# Patient Record
Sex: FEMALE | Race: WHITE | NOT HISPANIC OR LATINO | Employment: UNEMPLOYED | URBAN - NONMETROPOLITAN AREA
[De-identification: names, ages, dates, MRNs, and addresses within clinical notes are randomized per-mention and may not be internally consistent; named-entity substitution may affect disease eponyms.]

---

## 2017-01-04 ENCOUNTER — OFFICE VISIT (OUTPATIENT)
Dept: FAMILY MEDICINE CLINIC | Facility: CLINIC | Age: 57
End: 2017-01-04

## 2017-01-04 VITALS
BODY MASS INDEX: 29.82 KG/M2 | DIASTOLIC BLOOD PRESSURE: 80 MMHG | OXYGEN SATURATION: 98 % | HEIGHT: 65 IN | WEIGHT: 179 LBS | HEART RATE: 86 BPM | SYSTOLIC BLOOD PRESSURE: 138 MMHG

## 2017-01-04 DIAGNOSIS — G89.29 CHRONIC MIDLINE LOW BACK PAIN WITH LEFT-SIDED SCIATICA: Primary | ICD-10-CM

## 2017-01-04 DIAGNOSIS — M54.42 CHRONIC MIDLINE LOW BACK PAIN WITH LEFT-SIDED SCIATICA: Primary | ICD-10-CM

## 2017-01-04 PROCEDURE — 99213 OFFICE O/P EST LOW 20 MIN: CPT | Performed by: FAMILY MEDICINE

## 2017-01-04 RX ORDER — HYDROCODONE BITARTRATE AND ACETAMINOPHEN 10; 325 MG/1; MG/1
1 TABLET ORAL EVERY 6 HOURS PRN
Qty: 120 TABLET | Refills: 0 | Status: SHIPPED | OUTPATIENT
Start: 2017-01-04 | End: 2017-02-01 | Stop reason: SDUPTHER

## 2017-01-04 NOTE — PROGRESS NOTES
Subjective   Giselle Valdes is a 56 y.o. female.     Back Pain   This is a chronic problem. The current episode started more than 1 year ago. The problem has been gradually worsening since onset. The pain is present in the lumbar spine. The quality of the pain is described as aching and shooting. The pain radiates to the left foot. The pain is at a severity of 8/10. The pain is worse during the night. The symptoms are aggravated by standing. Stiffness is present in the morning. Associated symptoms include paresthesias (right leg went ot sleep). Pertinent negatives include no bladder incontinence, bowel incontinence or fever.        The following portions of the patient's history were reviewed and updated as appropriate: allergies, current medications, past family history, past medical history, past social history, past surgical history and problem list.    Review of Systems   Constitutional: Negative for fever.   Gastrointestinal: Negative for bowel incontinence.   Genitourinary: Negative for bladder incontinence.   Musculoskeletal: Positive for back pain.   Neurological: Positive for paresthesias (right leg went ot sleep).       Objective   Physical Exam   Constitutional: She is oriented to person, place, and time. She appears well-developed and well-nourished. No distress.   HENT:   Head: Normocephalic and atraumatic.   Musculoskeletal:        Lumbar back: She exhibits decreased range of motion, tenderness and pain. She exhibits no bony tenderness, no swelling, no edema, no deformity, no laceration and no spasm.   Neurological: She is alert and oriented to person, place, and time.   Reflex Scores:       Patellar reflexes are 2+ on the right side and 2+ on the left side.  Skin: Skin is warm and dry. She is not diaphoretic.   Psychiatric: She has a normal mood and affect. Her behavior is normal. Judgment and thought content normal.   Nursing note and vitals reviewed.      Assessment/Plan   Problems Addressed  this Visit        Other    Chronic midline low back pain with left-sided sciatica - Primary    Relevant Orders    Urine Drug Screen

## 2017-01-04 NOTE — MR AVS SNAPSHOT
Giselle Valdes   1/4/2017 1:15 PM   Office Visit    Dept Phone:  [phone]   Encounter #:  [id number]    Provider:  Mikal Trammell MD   Department:  [facility] FAM[facility] MEDICINE                Your Full Care Plan              Where to Get Your Medications      You can get these medications from any pharmacy     Bring a paper prescription for each of these medications     HYDROcodone-acetaminophen  MG per tablet            Your Updated Medication List          This list is accurate as of: 1/4/17  1:34 PM.  Always use your most recent med list.                amLODIPine 5 MG tablet   Commonly known as:  NORVASC   Take 1 tablet by mouth Daily.       atenolol 50 MG tablet   Commonly known as:  TENORMIN   Take 1 tablet by mouth Daily.       cyclobenzaprine 10 MG tablet   Commonly known as:  FLEXERIL       DULoxetine 60 MG capsule   Commonly known as:  CYMBALTA       HYDROcodone-acetaminophen  MG per tablet   Commonly known as:  NORCO   Take 1 tablet by mouth Every 6 (Six) Hours As Needed for moderate pain (4-6).       losartan 100 MG tablet   Commonly known as:  COZAAR   Take 1 tablet by mouth Daily.       omeprazole 20 MG capsule   Commonly known as:  priLOSEC       ORENCIA 125 MG/ML solution prefilled syringe   Generic drug:  Abatacept               You Were Diagnosed With        Codes Comments    Chronic midline low back pain with left-sided sciatica    -  Primary ICD-10-CM: M54.42, G89.29  ICD-9-CM: 724.2, 724.3, 338.29       Instructions     None    Patient Instructions History      Upcoming Appointments     Visit Type Date Time Department    OFFICE VISIT 1/4/2017  1:15 PM [facility]    OFFICE VISIT 2/1/2017  1:45 PM [facility]    OFFICE VISIT 6/27/2017  9:00 AM [facility] SURGERY [facility]      [facility] Sign[facility]     [facility] allows you to send messages to your doctor, view your test results, renew your prescriptions, schedule  "appointments, and more. To sign up, go to [employer] and click on the Sign Up Now link in the New User? box. Enter your [employer] Activation Code exactly as it appears below along with the last four digits of your Social Security Number and your Date of Birth () to complete the sign-up process. If you do not sign up before the expiration date, you must request a new code.    Rapt Activation Code: [email or web address]  Expires: 2017  1:33 PM    If you have questions, you can email [email or web address] or call [phone] to talk to our [employer] staff. Remember, [employer] is NOT to be used for urgent needs. For medical emergencies, dial 911.               Other Info from Your Visit           Your Appointments     2017  1:45 PM CST   Office Visit with Mikal Trammell MD   [facility] FAMILY MEDICINE (--)    [address] [place] [address] [address]  [facility] [address] [place] [id number] [phone]           Arrive 15 minutes prior to appointment.            2017  9:00 AM CDT   Office Visit with Brandyn Givens MD   [facility] GENERAL SURGERY (--)    [address] [place] [address] [address]  [facility] [address] [place] [id number] [phone]           Arrive 15 minutes prior to appointment.              Allergies     Fosamax [Alendronate]      GERD    Iodinated Diagnostic Agents  Rash      Reason for Visit     Hypertension           Vital Signs     Blood Pressure Pulse Height Weight Oxygen Saturation Body Mass Index    138/80 86 65\" (165.1 cm) 179 lb (81.2 kg) 98% 29.79 kg/m2    Smoking Status                   Current Every Day Smoker           Problems and Diagnoses Noted     Chronic midline low back pain with left-sided sciatica        "

## 2017-01-17 RX ORDER — AMLODIPINE BESYLATE 5 MG/1
TABLET ORAL
Qty: 30 TABLET | Refills: 5 | Status: SHIPPED | OUTPATIENT
Start: 2017-01-17 | End: 2017-10-27 | Stop reason: SDUPTHER

## 2017-01-17 RX ORDER — OMEPRAZOLE 20 MG/1
CAPSULE, DELAYED RELEASE ORAL
Qty: 60 CAPSULE | Refills: 5 | Status: SHIPPED | OUTPATIENT
Start: 2017-01-17 | End: 2018-01-15 | Stop reason: SDUPTHER

## 2017-02-01 ENCOUNTER — OFFICE VISIT (OUTPATIENT)
Dept: FAMILY MEDICINE CLINIC | Facility: CLINIC | Age: 57
End: 2017-02-01

## 2017-02-01 VITALS
BODY MASS INDEX: 29.2 KG/M2 | HEART RATE: 89 BPM | DIASTOLIC BLOOD PRESSURE: 76 MMHG | SYSTOLIC BLOOD PRESSURE: 132 MMHG | HEIGHT: 65 IN | OXYGEN SATURATION: 98 % | WEIGHT: 175.3 LBS

## 2017-02-01 DIAGNOSIS — M06.9 RHEUMATOID ARTHRITIS OF HAND, UNSPECIFIED LATERALITY, UNSPECIFIED RHEUMATOID FACTOR PRESENCE: Primary | ICD-10-CM

## 2017-02-01 DIAGNOSIS — M54.42 CHRONIC MIDLINE LOW BACK PAIN WITH LEFT-SIDED SCIATICA: ICD-10-CM

## 2017-02-01 DIAGNOSIS — G89.29 CHRONIC MIDLINE LOW BACK PAIN WITH LEFT-SIDED SCIATICA: ICD-10-CM

## 2017-02-01 PROCEDURE — 96372 THER/PROPH/DIAG INJ SC/IM: CPT | Performed by: FAMILY MEDICINE

## 2017-02-01 PROCEDURE — 99213 OFFICE O/P EST LOW 20 MIN: CPT | Performed by: FAMILY MEDICINE

## 2017-02-01 RX ORDER — HYDROCODONE BITARTRATE AND ACETAMINOPHEN 10; 325 MG/1; MG/1
1 TABLET ORAL EVERY 6 HOURS PRN
Qty: 120 TABLET | Refills: 0 | Status: SHIPPED | OUTPATIENT
Start: 2017-02-01 | End: 2017-03-02 | Stop reason: SDUPTHER

## 2017-02-01 RX ORDER — TRIAMCINOLONE ACETONIDE 40 MG/ML
80 INJECTION, SUSPENSION INTRA-ARTICULAR; INTRAMUSCULAR ONCE
Status: COMPLETED | OUTPATIENT
Start: 2017-02-01 | End: 2017-02-01

## 2017-02-01 RX ADMIN — TRIAMCINOLONE ACETONIDE 80 MG: 40 INJECTION, SUSPENSION INTRA-ARTICULAR; INTRAMUSCULAR at 14:10

## 2017-02-01 NOTE — PROGRESS NOTES
Subjective   Giselle Valdes is a 56 y.o. female.     Back Pain   This is a chronic problem. The current episode started more than 1 year ago. The problem has been gradually worsening since onset. The pain is present in the lumbar spine. The quality of the pain is described as aching and shooting. The pain radiates to the left foot. The pain is at a severity of 8/10. The pain is worse during the night. The symptoms are aggravated by standing. Stiffness is present in the morning. Associated symptoms include paresthesias (right leg went ot sleep). Pertinent negatives include no bladder incontinence, bowel incontinence or fever.        The following portions of the patient's history were reviewed and updated as appropriate: allergies, current medications, past family history, past medical history, past social history, past surgical history and problem list.    Review of Systems   Constitutional: Negative for fever.   Gastrointestinal: Negative for bowel incontinence.   Genitourinary: Negative for bladder incontinence.   Musculoskeletal: Positive for back pain.   Neurological: Positive for paresthesias (right leg went ot sleep).       Objective   Physical Exam   Constitutional: She is oriented to person, place, and time. She appears well-developed and well-nourished. No distress.   HENT:   Head: Normocephalic and atraumatic.   Musculoskeletal:        Lumbar back: She exhibits decreased range of motion, tenderness and pain. She exhibits no bony tenderness, no swelling, no edema, no deformity, no laceration and no spasm.   Neurological: She is alert and oriented to person, place, and time.   Reflex Scores:       Patellar reflexes are 2+ on the right side and 2+ on the left side.  Skin: Skin is warm and dry. She is not diaphoretic.   Psychiatric: She has a normal mood and affect. Her behavior is normal. Judgment and thought content normal.   Nursing note and vitals reviewed.      Assessment/Plan   Problems Addressed  this Visit        Musculoskeletal and Integument    Rheumatoid arthritis - Primary       Other    Chronic midline low back pain with left-sided sciatica

## 2017-03-02 ENCOUNTER — OFFICE VISIT (OUTPATIENT)
Dept: FAMILY MEDICINE CLINIC | Facility: CLINIC | Age: 57
End: 2017-03-02

## 2017-03-02 VITALS
BODY MASS INDEX: 29.77 KG/M2 | HEART RATE: 85 BPM | SYSTOLIC BLOOD PRESSURE: 118 MMHG | OXYGEN SATURATION: 98 % | DIASTOLIC BLOOD PRESSURE: 68 MMHG | WEIGHT: 178.9 LBS

## 2017-03-02 DIAGNOSIS — M54.2 NECK PAIN: ICD-10-CM

## 2017-03-02 DIAGNOSIS — M54.42 CHRONIC MIDLINE LOW BACK PAIN WITH LEFT-SIDED SCIATICA: Primary | ICD-10-CM

## 2017-03-02 DIAGNOSIS — G89.29 CHRONIC MIDLINE LOW BACK PAIN WITH LEFT-SIDED SCIATICA: Primary | ICD-10-CM

## 2017-03-02 PROCEDURE — 99213 OFFICE O/P EST LOW 20 MIN: CPT | Performed by: FAMILY MEDICINE

## 2017-03-02 RX ORDER — HYDROCODONE BITARTRATE AND ACETAMINOPHEN 10; 325 MG/1; MG/1
1 TABLET ORAL EVERY 6 HOURS PRN
Qty: 120 TABLET | Refills: 0 | Status: SHIPPED | OUTPATIENT
Start: 2017-03-02 | End: 2017-05-02 | Stop reason: SDUPTHER

## 2017-03-02 RX ORDER — HYDROCODONE BITARTRATE AND ACETAMINOPHEN 10; 325 MG/1; MG/1
1 TABLET ORAL EVERY 6 HOURS PRN
Qty: 120 TABLET | Refills: 0 | Status: SHIPPED | OUTPATIENT
Start: 2017-03-02 | End: 2017-03-02 | Stop reason: SDUPTHER

## 2017-03-02 NOTE — PROGRESS NOTES
Subjective   Giselle Valdes is a 56 y.o. female.     Back Pain   This is a chronic problem. The current episode started more than 1 year ago. The problem has been gradually worsening since onset. The pain is present in the lumbar spine. The quality of the pain is described as aching and shooting. The pain radiates to the left foot. The pain is at a severity of 4/10. The pain is worse during the night. The symptoms are aggravated by standing. Stiffness is present in the morning. Associated symptoms include paresthesias (right leg went ot sleep). Pertinent negatives include no bladder incontinence, bowel incontinence, fever, numbness, tingling or weakness.   Neck Pain    This is a new problem. The current episode started in the past 7 days. The problem occurs constantly. The problem has been gradually worsening. The pain is associated with nothing. The pain is present in the occipital region. The quality of the pain is described as shooting. The pain is moderate. Pertinent negatives include no fever, numbness, tingling or weakness.        The following portions of the patient's history were reviewed and updated as appropriate: allergies, current medications, past family history, past medical history, past social history, past surgical history and problem list.    Review of Systems   Constitutional: Negative for fever.   Gastrointestinal: Negative for bowel incontinence.   Genitourinary: Negative for bladder incontinence.   Musculoskeletal: Positive for back pain and neck pain.   Neurological: Positive for paresthesias (right leg went ot sleep). Negative for tingling, weakness and numbness.       Objective   Physical Exam   Constitutional: She is oriented to person, place, and time. She appears well-developed and well-nourished. No distress.   HENT:   Head: Normocephalic and atraumatic.   Musculoskeletal:        Cervical back: She exhibits decreased range of motion, tenderness and pain. She exhibits no bony  tenderness, no swelling, no edema, no deformity, no laceration and no spasm.        Lumbar back: She exhibits decreased range of motion, tenderness and pain. She exhibits no bony tenderness, no swelling, no edema, no deformity, no laceration and no spasm.   Neurological: She is alert and oriented to person, place, and time.   Reflex Scores:       Patellar reflexes are 2+ on the right side and 2+ on the left side.  Skin: Skin is warm and dry. She is not diaphoretic.   Psychiatric: She has a normal mood and affect. Her behavior is normal. Judgment and thought content normal.   Nursing note and vitals reviewed.      Assessment/Plan   Problems Addressed this Visit        Other    Chronic midline low back pain with left-sided sciatica - Primary      Other Visit Diagnoses     Neck pain

## 2017-05-02 ENCOUNTER — OFFICE VISIT (OUTPATIENT)
Dept: FAMILY MEDICINE CLINIC | Facility: CLINIC | Age: 57
End: 2017-05-02

## 2017-05-02 ENCOUNTER — LAB (OUTPATIENT)
Dept: LAB | Facility: HOSPITAL | Age: 57
End: 2017-05-02

## 2017-05-02 VITALS
BODY MASS INDEX: 29.45 KG/M2 | DIASTOLIC BLOOD PRESSURE: 76 MMHG | OXYGEN SATURATION: 98 % | WEIGHT: 177 LBS | SYSTOLIC BLOOD PRESSURE: 120 MMHG | HEART RATE: 70 BPM

## 2017-05-02 DIAGNOSIS — I10 ESSENTIAL HYPERTENSION: Primary | ICD-10-CM

## 2017-05-02 DIAGNOSIS — R53.83 FATIGUE, UNSPECIFIED TYPE: ICD-10-CM

## 2017-05-02 DIAGNOSIS — G89.29 CHRONIC MIDLINE LOW BACK PAIN WITH LEFT-SIDED SCIATICA: ICD-10-CM

## 2017-05-02 DIAGNOSIS — M06.9 RHEUMATOID ARTHRITIS OF HAND, UNSPECIFIED LATERALITY, UNSPECIFIED RHEUMATOID FACTOR PRESENCE: ICD-10-CM

## 2017-05-02 DIAGNOSIS — M54.42 CHRONIC MIDLINE LOW BACK PAIN WITH LEFT-SIDED SCIATICA: ICD-10-CM

## 2017-05-02 LAB
ALBUMIN SERPL-MCNC: 4.3 G/DL (ref 3.4–4.8)
ALBUMIN/GLOB SERPL: 1.2 G/DL (ref 1.1–1.8)
ALP SERPL-CCNC: 78 U/L (ref 38–126)
ALT SERPL W P-5'-P-CCNC: 31 U/L (ref 9–52)
ANION GAP SERPL CALCULATED.3IONS-SCNC: 10 MMOL/L (ref 5–15)
AST SERPL-CCNC: 26 U/L (ref 14–36)
BASOPHILS # BLD AUTO: 0.05 10*3/MM3 (ref 0–0.2)
BASOPHILS NFR BLD AUTO: 0.5 % (ref 0–2)
BILIRUB SERPL-MCNC: 0.7 MG/DL (ref 0.2–1.3)
BUN BLD-MCNC: 11 MG/DL (ref 7–21)
BUN/CREAT SERPL: 13.3 (ref 7–25)
CALCIUM SPEC-SCNC: 9.9 MG/DL (ref 8.4–10.2)
CHLORIDE SERPL-SCNC: 99 MMOL/L (ref 95–110)
CO2 SERPL-SCNC: 27 MMOL/L (ref 22–31)
CREAT BLD-MCNC: 0.83 MG/DL (ref 0.5–1)
DEPRECATED RDW RBC AUTO: 46 FL (ref 36.4–46.3)
EOSINOPHIL # BLD AUTO: 0.16 10*3/MM3 (ref 0–0.7)
EOSINOPHIL NFR BLD AUTO: 1.7 % (ref 0–7)
ERYTHROCYTE [DISTWIDTH] IN BLOOD BY AUTOMATED COUNT: 13.9 % (ref 11.5–14.5)
GFR SERPL CREATININE-BSD FRML MDRD: 71 ML/MIN/1.73 (ref 51–120)
GLOBULIN UR ELPH-MCNC: 3.5 GM/DL (ref 2.3–3.5)
GLUCOSE BLD-MCNC: 103 MG/DL (ref 60–100)
HCT VFR BLD AUTO: 40.8 % (ref 35–45)
HGB BLD-MCNC: 13.9 G/DL (ref 12–15.5)
IMM GRANULOCYTES # BLD: 0.05 10*3/MM3 (ref 0–0.02)
IMM GRANULOCYTES NFR BLD: 0.5 % (ref 0–0.5)
LYMPHOCYTES # BLD AUTO: 2.62 10*3/MM3 (ref 0.6–4.2)
LYMPHOCYTES NFR BLD AUTO: 28.2 % (ref 10–50)
MCH RBC QN AUTO: 31 PG (ref 26.5–34)
MCHC RBC AUTO-ENTMCNC: 34.1 G/DL (ref 31.4–36)
MCV RBC AUTO: 91.1 FL (ref 80–98)
MONOCYTES # BLD AUTO: 0.88 10*3/MM3 (ref 0–0.9)
MONOCYTES NFR BLD AUTO: 9.5 % (ref 0–12)
NEUTROPHILS # BLD AUTO: 5.53 10*3/MM3 (ref 2–8.6)
NEUTROPHILS NFR BLD AUTO: 59.6 % (ref 37–80)
PLATELET # BLD AUTO: 375 10*3/MM3 (ref 150–450)
PMV BLD AUTO: 9 FL (ref 8–12)
POTASSIUM BLD-SCNC: 4.1 MMOL/L (ref 3.5–5.1)
PROT SERPL-MCNC: 7.8 G/DL (ref 6.3–8.6)
RBC # BLD AUTO: 4.48 10*6/MM3 (ref 3.77–5.16)
SODIUM BLD-SCNC: 136 MMOL/L (ref 137–145)
T4 FREE SERPL-MCNC: 1.05 NG/DL (ref 0.78–2.19)
TSH SERPL DL<=0.05 MIU/L-ACNC: 1.24 MIU/ML (ref 0.46–4.68)
VIT B12 BLD-MCNC: 248 PG/ML (ref 239–931)
WBC NRBC COR # BLD: 9.29 10*3/MM3 (ref 3.2–9.8)

## 2017-05-02 PROCEDURE — 80053 COMPREHEN METABOLIC PANEL: CPT | Performed by: FAMILY MEDICINE

## 2017-05-02 PROCEDURE — 36415 COLL VENOUS BLD VENIPUNCTURE: CPT | Performed by: FAMILY MEDICINE

## 2017-05-02 PROCEDURE — 82607 VITAMIN B-12: CPT | Performed by: FAMILY MEDICINE

## 2017-05-02 PROCEDURE — 84439 ASSAY OF FREE THYROXINE: CPT | Performed by: FAMILY MEDICINE

## 2017-05-02 PROCEDURE — 85025 COMPLETE CBC W/AUTO DIFF WBC: CPT | Performed by: FAMILY MEDICINE

## 2017-05-02 PROCEDURE — 99214 OFFICE O/P EST MOD 30 MIN: CPT | Performed by: FAMILY MEDICINE

## 2017-05-02 PROCEDURE — 84443 ASSAY THYROID STIM HORMONE: CPT | Performed by: FAMILY MEDICINE

## 2017-05-02 RX ORDER — HYDROCODONE BITARTRATE AND ACETAMINOPHEN 10; 325 MG/1; MG/1
1 TABLET ORAL EVERY 6 HOURS PRN
Qty: 120 TABLET | Refills: 0 | Status: SHIPPED | OUTPATIENT
Start: 2017-05-02 | End: 2017-06-02 | Stop reason: SDUPTHER

## 2017-05-17 RX ORDER — CYCLOBENZAPRINE HCL 10 MG
TABLET ORAL
Qty: 30 TABLET | Refills: 11 | Status: SHIPPED | OUTPATIENT
Start: 2017-05-17 | End: 2017-06-02 | Stop reason: SDUPTHER

## 2017-05-23 DIAGNOSIS — M06.9 RHEUMATOID ARTHRITIS INVOLVING MULTIPLE SITES, UNSPECIFIED RHEUMATOID FACTOR PRESENCE: Primary | ICD-10-CM

## 2017-05-24 ENCOUNTER — TELEPHONE (OUTPATIENT)
Dept: FAMILY MEDICINE CLINIC | Facility: CLINIC | Age: 57
End: 2017-05-24

## 2017-06-02 ENCOUNTER — OFFICE VISIT (OUTPATIENT)
Dept: FAMILY MEDICINE CLINIC | Facility: CLINIC | Age: 57
End: 2017-06-02

## 2017-06-02 VITALS
WEIGHT: 180.8 LBS | HEART RATE: 86 BPM | BODY MASS INDEX: 30.09 KG/M2 | SYSTOLIC BLOOD PRESSURE: 138 MMHG | DIASTOLIC BLOOD PRESSURE: 80 MMHG | OXYGEN SATURATION: 98 %

## 2017-06-02 DIAGNOSIS — G89.29 CHRONIC MIDLINE LOW BACK PAIN WITH LEFT-SIDED SCIATICA: ICD-10-CM

## 2017-06-02 DIAGNOSIS — M06.9 RHEUMATOID ARTHRITIS OF HAND, UNSPECIFIED LATERALITY, UNSPECIFIED RHEUMATOID FACTOR PRESENCE: Primary | ICD-10-CM

## 2017-06-02 DIAGNOSIS — M54.42 CHRONIC MIDLINE LOW BACK PAIN WITH LEFT-SIDED SCIATICA: ICD-10-CM

## 2017-06-02 PROCEDURE — 99213 OFFICE O/P EST LOW 20 MIN: CPT | Performed by: FAMILY MEDICINE

## 2017-06-02 RX ORDER — HYDROCODONE BITARTRATE AND ACETAMINOPHEN 10; 325 MG/1; MG/1
1 TABLET ORAL EVERY 6 HOURS PRN
Qty: 120 TABLET | Refills: 0 | Status: SHIPPED | OUTPATIENT
Start: 2017-06-02 | End: 2017-06-27 | Stop reason: SDUPTHER

## 2017-06-02 RX ORDER — CYCLOBENZAPRINE HCL 10 MG
10 TABLET ORAL 3 TIMES DAILY PRN
Qty: 30 TABLET | Refills: 11 | Status: SHIPPED | OUTPATIENT
Start: 2017-06-02 | End: 2017-11-20 | Stop reason: SDUPTHER

## 2017-06-02 NOTE — PROGRESS NOTES
Subjective   Giselle Valdes is a 56 y.o. female.     Back Pain   This is a chronic problem. The current episode started more than 1 year ago. The problem has been gradually worsening since onset. The pain is present in the lumbar spine. The quality of the pain is described as aching and shooting. The pain radiates to the left foot. The pain is at a severity of 7/10. The pain is worse during the night. The symptoms are aggravated by standing. Stiffness is present in the morning and all day. Associated symptoms include paresthesias (right leg went ot sleep). Pertinent negatives include no bladder incontinence, bowel incontinence or fever.        The following portions of the patient's history were reviewed and updated as appropriate: allergies, current medications, past family history, past medical history, past social history, past surgical history and problem list.    Review of Systems   Constitutional: Negative for fever.   Gastrointestinal: Negative for bowel incontinence.   Genitourinary: Negative for bladder incontinence.   Musculoskeletal: Positive for back pain.   Neurological: Positive for paresthesias (right leg went ot sleep).       Objective   Physical Exam   Constitutional: She is oriented to person, place, and time. She appears well-developed and well-nourished. No distress.   HENT:   Head: Normocephalic and atraumatic.   Musculoskeletal:        Lumbar back: She exhibits decreased range of motion, tenderness and pain. She exhibits no bony tenderness, no swelling, no edema, no deformity, no laceration and no spasm.   Neurological: She is alert and oriented to person, place, and time.   Reflex Scores:       Patellar reflexes are 2+ on the right side and 2+ on the left side.  Skin: Skin is warm and dry. She is not diaphoretic.   Psychiatric: She has a normal mood and affect. Her behavior is normal. Judgment and thought content normal.   Nursing note and vitals reviewed.      Assessment/Plan   Problems  Addressed this Visit        Musculoskeletal and Integument    Rheumatoid arthritis - Primary       Other    Chronic midline low back pain with left-sided sciatica

## 2017-06-21 RX ORDER — DULOXETIN HYDROCHLORIDE 60 MG/1
CAPSULE, DELAYED RELEASE ORAL
Qty: 30 CAPSULE | Refills: 11 | Status: SHIPPED | OUTPATIENT
Start: 2017-06-21 | End: 2018-06-15 | Stop reason: SDUPTHER

## 2017-06-27 ENCOUNTER — OFFICE VISIT (OUTPATIENT)
Dept: FAMILY MEDICINE CLINIC | Facility: CLINIC | Age: 57
End: 2017-06-27

## 2017-06-27 ENCOUNTER — PREP FOR SURGERY (OUTPATIENT)
Dept: OTHER | Facility: HOSPITAL | Age: 57
End: 2017-06-27

## 2017-06-27 ENCOUNTER — OFFICE VISIT (OUTPATIENT)
Dept: SURGERY | Facility: CLINIC | Age: 57
End: 2017-06-27

## 2017-06-27 VITALS
HEART RATE: 89 BPM | OXYGEN SATURATION: 98 % | HEIGHT: 65 IN | WEIGHT: 182.1 LBS | BODY MASS INDEX: 30.34 KG/M2 | DIASTOLIC BLOOD PRESSURE: 82 MMHG | SYSTOLIC BLOOD PRESSURE: 134 MMHG

## 2017-06-27 VITALS
DIASTOLIC BLOOD PRESSURE: 70 MMHG | SYSTOLIC BLOOD PRESSURE: 124 MMHG | HEIGHT: 65 IN | BODY MASS INDEX: 30.66 KG/M2 | WEIGHT: 184 LBS

## 2017-06-27 DIAGNOSIS — I10 ESSENTIAL HYPERTENSION: Primary | ICD-10-CM

## 2017-06-27 DIAGNOSIS — Z86.010 HISTORY OF COLON POLYPS: Primary | ICD-10-CM

## 2017-06-27 DIAGNOSIS — G89.29 CHRONIC MIDLINE LOW BACK PAIN WITH LEFT-SIDED SCIATICA: ICD-10-CM

## 2017-06-27 DIAGNOSIS — M06.9 RHEUMATOID ARTHRITIS OF HAND, UNSPECIFIED LATERALITY, UNSPECIFIED RHEUMATOID FACTOR PRESENCE: ICD-10-CM

## 2017-06-27 DIAGNOSIS — F32.A DEPRESSIVE DISORDER: ICD-10-CM

## 2017-06-27 DIAGNOSIS — M54.42 CHRONIC MIDLINE LOW BACK PAIN WITH LEFT-SIDED SCIATICA: ICD-10-CM

## 2017-06-27 PROCEDURE — 99214 OFFICE O/P EST MOD 30 MIN: CPT | Performed by: FAMILY MEDICINE

## 2017-06-27 PROCEDURE — 99213 OFFICE O/P EST LOW 20 MIN: CPT | Performed by: SURGERY

## 2017-06-27 RX ORDER — HYDROCODONE BITARTRATE AND ACETAMINOPHEN 10; 325 MG/1; MG/1
1 TABLET ORAL EVERY 6 HOURS PRN
Qty: 120 TABLET | Refills: 0 | Status: SHIPPED | OUTPATIENT
Start: 2017-06-27 | End: 2017-07-27 | Stop reason: SDUPTHER

## 2017-06-27 RX ORDER — LIDOCAINE HYDROCHLORIDE 10 MG/ML
0.1 INJECTION, SOLUTION EPIDURAL; INFILTRATION; INTRACAUDAL; PERINEURAL ONCE AS NEEDED
Status: CANCELLED | OUTPATIENT
Start: 2017-07-18

## 2017-06-27 RX ORDER — DEXTROSE AND SODIUM CHLORIDE 5; .45 G/100ML; G/100ML
30 INJECTION, SOLUTION INTRAVENOUS CONTINUOUS PRN
Status: CANCELLED | OUTPATIENT
Start: 2017-07-18

## 2017-06-27 RX ORDER — SODIUM CHLORIDE 0.9 % (FLUSH) 0.9 %
1-10 SYRINGE (ML) INJECTION AS NEEDED
Status: CANCELLED | OUTPATIENT
Start: 2017-07-18

## 2017-06-27 NOTE — PROGRESS NOTES
Chief Complaint: Need for Colonoscopy    Giselle Valdes is a 56 y.o. female here today to arrange surveillance colonoscopy.   She underwent right colon resection last year for large villous adenoma.    Prior Colonoscopy:yes  Prior Polyps:yes  Family History of Colon Cancer:no  On anticoagulation/antiplatelets:no    Past Surgical History:   Procedure Laterality Date   • BREAST BIOPSY  02/06/2001    left ,breast mass,lobular carcinoma   • BREAST RECONSTRUCTION  10/09/2001    Bi;ateral nipple/areolar reconstruction using C-V flaps and full thickness skin grafts from groin.Acquired absence of bilateral breast   • CHOLECYSTECTOMY  06/01/2016    Laparoscopic right colon resectoin with ileocolonic anastomosis   • COLONOSCOPY  06/01/2016    Laparoscopic right colon resectoin with ileocolonic anastomosis. 06/01/2016    n/a One 13 mm polyp at 90 cm prox. to the anus. Resected and retrieved.Normal colon 2 polyps.   • ENDOSCOPY  04/10/2001    Hiatal hernia,Mild esophagitis,Due to the coarsening of the pancreas on ultrasound,will need to do a CT of the abdomen   • HAND SURGERY  11/21/1997    excision thumb lesion, MP joint    • INJECTION OF MEDICATION  09/30/2014    celestone(2)   • INJECTION OF MEDICATION  06/25/2013    DEXAMETHASONE(9)   • INJECTION OF MEDICATION  01/29/2012    SOLU-MEDROL (1)   • KNEE ARTHROSCOPY  03/01/2001    right,tear lateral meniscus   • MASTECTOMY  03/01/2001    left modified radical mastectomy,right total,varcinoma of the left breast,with lobular carcinoma in situ   • SKIN BIOPSY  08/03/1995    right,cheek lesion ,solar degeneration with sebaceous gland hyperplasia,     Past Medical History:   Diagnosis Date   • Acute maxillary sinusitis 01/14/2014   • Anorectal pain 05/03/2016   • Astigmatism 12/03/2014   • Bunion [id number]   • Chronic back pain 12/02/2015     low back and othes from RA      • Constipation 03/24/2016   • Cramp in limb 01/04/2016    lower   • Depressive disorder 08/12/2015   •  Disorder of gallbladder 07/01/2015    gallstones, 6mm wall, 4 mm cbd      • Gastroesophageal reflux disease 01/25/2010   • H/O echocardiogram 04/10/2001    This is a limited study ,however the LV appears to be functioning normally with an estimated fraction of 60-65%   • History of colonic polyps 06/28/2016   • Hypermetropia 12/03/2014   • Low back pain 06/14/2016    spinal stenosis on MRI Seeing korina Yen      • Neck pain 08/12/2015   • Neuropathy 08/12/2015   • Peripheral nervous system disorder 12/07/2012   • Primary malignant neoplasm of breast 11/22/2012    no recurrence noted      • Rheumatoid arthritis 04/01/2016   • Tobacco dependence syndrome 11/22/2012     Social History     Social History   • Marital status:      Spouse name: N/A   • Number of children: N/A   • Years of education: N/A     Occupational History   • Not on file.     Social History Main Topics   • Smoking status: Current Every Day Smoker     Packs/day: 1.00   • Smokeless tobacco: Never Used   • Alcohol use No   • Drug use: No   • Sexual activity: Not on file     Other Topics Concern   • Not on file     Social History Narrative     Current Outpatient Prescriptions on File Prior to Visit   Medication Sig Dispense Refill   • Abatacept (ORENCIA) 125 MG/ML solution prefilled syringe Inject 125 mg under the skin 1 (One) Time Per Week.     • amLODIPine (NORVASC) 5 MG tablet TAKE 1 TABLET EVERY DAY 30 tablet 5   • atenolol (TENORMIN) 50 MG tablet Take 1 tablet by mouth Daily. 30 tablet 11   • cyclobenzaprine (FLEXERIL) 10 MG tablet Take 1 tablet by mouth 3 (Three) Times a Day As Needed for Muscle Spasms. 30 tablet 11   • DULoxetine (CYMBALTA) 60 MG capsule TAKE 1 CAPSULE BY MOUTH DAILY 30 capsule 11   • HYDROcodone-acetaminophen (NORCO)  MG per tablet Take 1 tablet by mouth Every 6 (Six) Hours As Needed for Moderate Pain (4-6). 120 tablet 0   • losartan (COZAAR) 100 MG tablet Take 1 tablet by mouth Daily. 30 tablet 11   •  omeprazole (priLOSEC) 20 MG capsule TAKE 1 CAPSULE EVERY DAY 60 capsule 5     No current facility-administered medications on file prior to visit.          Review of Systems   Constitutional: Negative for appetite change, chills, fever and unexpected weight change.   HENT: Negative for hearing loss, nosebleeds and trouble swallowing.    Eyes: Negative for visual disturbance.   Respiratory: Negative for apnea, cough, choking, chest tightness, shortness of breath, wheezing and stridor.    Cardiovascular: Negative for chest pain, palpitations and leg swelling.   Gastrointestinal: Negative for abdominal distention, abdominal pain, blood in stool, constipation, diarrhea, nausea and vomiting.   Endocrine: Negative for cold intolerance, heat intolerance, polydipsia, polyphagia and polyuria.   Genitourinary: Negative for difficulty urinating, dysuria, frequency, hematuria and urgency.   Musculoskeletal: Positive for arthralgias. Negative for back pain, myalgias and neck pain.   Skin: Negative for color change, pallor and rash.   Allergic/Immunologic: Negative for immunocompromised state.   Neurological: Negative for dizziness, seizures, syncope, light-headedness, numbness and headaches.   Hematological: Negative for adenopathy.   Psychiatric/Behavioral: Negative for suicidal ideas. The patient is not nervous/anxious.        Vitals:    06/27/17 0856   BP: 124/70       Physical Exam:       General Appearance:    Alert, cooperative, in no acute distress   Head:    Normocephalic, without obvious abnormality, atraumatic   Eyes:            Lids and lashes normal, conjunctivae and sclerae normal, no   icterus, no pallor, corneas clear, PERRLA   Ears:    Ears appear intact with no abnormalities noted   Throat:   No oral lesions, no thrush, oral mucosa moist   Neck:   No adenopathy, supple, trachea midline, no thyromegaly, no   carotid bruit, no JVD   Back:     No kyphosis present, no scoliosis present, no skin lesions,       erythema or scars, no tenderness to percussion or                   palpation, range of motion normal   Lungs:     Clear to auscultation,respirations regular, even and                  unlabored    Heart:    Regular rhythm and normal rate, normal S1 and S2, no            murmur, no gallop, no rub, no click   Chest Wall:    No abnormalities observed   Abdomen:     Normal bowel sounds, no masses, no organomegaly, soft        non-tender, non-distended, no guarding, no rebound                Tenderness, well healed scar   Extremities:   Moves all extremities well, no edema, no cyanosis, no             redness   Pulses:   Pulses palpable and equal bilaterally   Skin:   No bleeding, bruising or rash   Lymph nodes:   No palpable adenopathy   Neurologic:   Cranial nerves 2 - 12 grossly intact, sensation intact, DTR       present and equal bilaterally       Assessment     In need of surveillance colonoscopy.    Plan     Risks, benefits, rationale and prep for colonoscopy have been discussed with the patient.  The patient indicates understanding of these issues and agrees with the plan.  Prep with magnesium citrate and dulcolax per her wishes.  Hold Orencia the week prior.          This document has been electronically signed by Brandyn Givens MD on June 27, 2017 9:27 AM

## 2017-06-27 NOTE — PROGRESS NOTES
Subjective   Giselle Valdes is a 56 y.o. female.     Hypertension   This is a chronic (elevated Bp today) problem. The current episode started today. The problem has been waxing and waning since onset. The problem is controlled. Associated symptoms include headaches. Pertinent negatives include no chest pain, peripheral edema or PND. Agents associated with hypertension include steroids. Risk factors for coronary artery disease include family history. Past treatments include nothing. Compliance problems include diet.    Back Pain   This is a chronic problem. The current episode started more than 1 year ago. The problem has been gradually worsening since onset. The pain is present in the lumbar spine. The quality of the pain is described as aching and shooting. The pain radiates to the left foot. The pain is at a severity of 6/10. The pain is worse during the night. The symptoms are aggravated by standing. Stiffness is present in the morning. Associated symptoms include headaches. Pertinent negatives include no bladder incontinence, bowel incontinence, chest pain or fever. Paresthesias: right leg went ot sleep.   Depression   Visit Type: follow-up  Patient presents with the following symptoms: insomnia.  Patient is not experiencing: depressed mood and nervousness/anxiety.         The following portions of the patient's history were reviewed and updated as appropriate: allergies, current medications, past family history, past medical history, past social history, past surgical history and problem list.    Review of Systems   Constitutional: Negative for fever.   Cardiovascular: Negative for chest pain and PND.   Gastrointestinal: Negative for bowel incontinence.   Genitourinary: Negative for bladder incontinence.   Musculoskeletal: Positive for back pain.   Neurological: Positive for headaches. Paresthesias: right leg went ot sleep.   Psychiatric/Behavioral: The patient has insomnia. The patient is not  nervous/anxious.        Objective   Physical Exam   Constitutional: She is oriented to person, place, and time. She appears well-developed and well-nourished. No distress.   HENT:   Head: Normocephalic and atraumatic.   Cardiovascular: Normal rate, regular rhythm and normal heart sounds.    Pulmonary/Chest: Effort normal and breath sounds normal. No respiratory distress. She has no wheezes. She has no rales. She exhibits no tenderness.   Musculoskeletal:        Lumbar back: She exhibits decreased range of motion, tenderness and pain. She exhibits no bony tenderness, no swelling, no edema, no deformity, no laceration and no spasm.   Neurological: She is alert and oriented to person, place, and time.   Reflex Scores:       Patellar reflexes are 2+ on the right side and 2+ on the left side.  Skin: Skin is warm and dry. She is not diaphoretic.   Psychiatric: She has a normal mood and affect. Her behavior is normal. Judgment and thought content normal.   Nursing note and vitals reviewed.      Assessment/Plan   Problems Addressed this Visit        Cardiovascular and Mediastinum    Essential hypertension - Primary (Chronic)       Musculoskeletal and Integument    Rheumatoid arthritis (Chronic)       Other    Chronic midline low back pain with left-sided sciatica (Chronic)    Depressive disorder

## 2017-07-18 ENCOUNTER — ANESTHESIA EVENT (OUTPATIENT)
Dept: GASTROENTEROLOGY | Facility: HOSPITAL | Age: 57
End: 2017-07-18

## 2017-07-18 ENCOUNTER — ANESTHESIA (OUTPATIENT)
Dept: GASTROENTEROLOGY | Facility: HOSPITAL | Age: 57
End: 2017-07-18

## 2017-07-18 ENCOUNTER — HOSPITAL ENCOUNTER (OUTPATIENT)
Facility: HOSPITAL | Age: 57
Setting detail: HOSPITAL OUTPATIENT SURGERY
Discharge: HOME | End: 2017-07-18
Attending: SURGERY | Admitting: SURGERY

## 2017-07-18 VITALS
HEART RATE: 74 BPM | HEIGHT: 65 IN | SYSTOLIC BLOOD PRESSURE: 122 MMHG | TEMPERATURE: 98 F | BODY MASS INDEX: 29.38 KG/M2 | RESPIRATION RATE: 18 BRPM | WEIGHT: 176.37 LBS | DIASTOLIC BLOOD PRESSURE: 73 MMHG | OXYGEN SATURATION: 96 %

## 2017-07-18 DIAGNOSIS — Z86.010 HISTORY OF COLON POLYPS: ICD-10-CM

## 2017-07-18 PROCEDURE — 88305 TISSUE EXAM BY PATHOLOGIST: CPT | Performed by: PATHOLOGY

## 2017-07-18 PROCEDURE — 45380 COLONOSCOPY AND BIOPSY: CPT | Performed by: SURGERY

## 2017-07-18 PROCEDURE — 25010000002 PROPOFOL 10 MG/ML EMULSION: Performed by: NURSE ANESTHETIST, CERTIFIED REGISTERED

## 2017-07-18 PROCEDURE — 88305 TISSUE EXAM BY PATHOLOGIST: CPT | Performed by: SURGERY

## 2017-07-18 PROCEDURE — 25010000002 MIDAZOLAM PER 1 MG: Performed by: NURSE ANESTHETIST, CERTIFIED REGISTERED

## 2017-07-18 RX ORDER — DEXTROSE AND SODIUM CHLORIDE 5; .45 G/100ML; G/100ML
30 INJECTION, SOLUTION INTRAVENOUS CONTINUOUS PRN
Status: DISCONTINUED | OUTPATIENT
Start: 2017-07-18 | End: 2017-07-18 | Stop reason: HOSPADM

## 2017-07-18 RX ORDER — LIDOCAINE HYDROCHLORIDE 10 MG/ML
0.1 INJECTION, SOLUTION EPIDURAL; INFILTRATION; INTRACAUDAL; PERINEURAL ONCE AS NEEDED
Status: DISCONTINUED | OUTPATIENT
Start: 2017-07-18 | End: 2017-07-18 | Stop reason: HOSPADM

## 2017-07-18 RX ORDER — MIDAZOLAM HYDROCHLORIDE 1 MG/ML
INJECTION INTRAMUSCULAR; INTRAVENOUS AS NEEDED
Status: DISCONTINUED | OUTPATIENT
Start: 2017-07-18 | End: 2017-07-18 | Stop reason: SURG

## 2017-07-18 RX ORDER — PROPOFOL 10 MG/ML
VIAL (ML) INTRAVENOUS AS NEEDED
Status: DISCONTINUED | OUTPATIENT
Start: 2017-07-18 | End: 2017-07-18 | Stop reason: SURG

## 2017-07-18 RX ADMIN — MIDAZOLAM 2 MG: 1 INJECTION INTRAMUSCULAR; INTRAVENOUS at 13:44

## 2017-07-18 RX ADMIN — PROPOFOL 30 MG: 10 INJECTION, EMULSION INTRAVENOUS at 13:52

## 2017-07-18 RX ADMIN — PROPOFOL 30 MG: 10 INJECTION, EMULSION INTRAVENOUS at 13:48

## 2017-07-18 RX ADMIN — DEXTROSE AND SODIUM CHLORIDE 30 ML/HR: 5; 450 INJECTION, SOLUTION INTRAVENOUS at 12:49

## 2017-07-18 RX ADMIN — PROPOFOL 30 MG: 10 INJECTION, EMULSION INTRAVENOUS at 13:57

## 2017-07-18 RX ADMIN — PROPOFOL 70 MG: 10 INJECTION, EMULSION INTRAVENOUS at 13:45

## 2017-07-18 NOTE — ANESTHESIA POSTPROCEDURE EVALUATION
Patient: Giselle Valdes    Procedure Summary     Date Anesthesia Start Anesthesia Stop Room / Location    07/18/17 1344 1404 [facility] ENDOSCOPY 2 / [facility] ENDOSCOPY       Procedure Diagnosis Surgeon Provider    COLONOSCOPY (N/A ) History of colon polyps  (History of colon polyps [Z86.010]) MD Amanda Scott CRNA          Anesthesia Type: MAC  Last vitals  /66       Temp        Pulse    75   Resp 16       SpO2     100     Post Anesthesia Care and Evaluation    Patient location during evaluation: bedside  Patient participation: complete - patient participated  Level of consciousness: awake and alert  Pain score: 0  Airway patency: patent  Anesthetic complications: No anesthetic complications  PONV Status: none  Cardiovascular status: acceptable  Respiratory status: acceptable  Hydration status: acceptable

## 2017-07-18 NOTE — ANESTHESIA PREPROCEDURE EVALUATION
Anesthesia Evaluation     no history of anesthetic complications:  NPO Solid Status: > 8 hours  NPO Liquid Status: > 2 hours     Airway   Mallampati: II  TM distance: >3 FB  Neck ROM: full  no difficulty expected  Dental - normal exam     Pulmonary - normal exam   (+) a smoker Current,   Cardiovascular     (+) hypertension well controlled,       Neuro/Psych  (+) psychiatric history Depression,    GI/Hepatic/Renal/Endo    (+)  GERD,     Musculoskeletal     (+) neck pain,   Abdominal    Substance History      OB/GYN          Other      history of cancer                                    Anesthesia Plan    ASA 2     MAC     intravenous induction   Anesthetic plan and risks discussed with patient.

## 2017-07-18 NOTE — INTERVAL H&P NOTE
H&P reviewed. The patient was examined and there are no changes to the H&P.         This document has been electronically signed by Brandyn Givens MD on July 18, 2017 1:33 PM

## 2017-07-18 NOTE — PLAN OF CARE
Problem: Patient Care Overview (Adult)  Goal: Plan of Care Review  Outcome: Ongoing (interventions implemented as appropriate)    07/18/17 1402   Coping/Psychosocial Response Interventions   Plan Of Care Reviewed With patient   Patient Care Overview   Progress no change         Problem: GI Endoscopy (Adult)  Goal: Signs and Symptoms of Listed Potential Problems Will be Absent or Manageable (GI Endoscopy)  Outcome: Ongoing (interventions implemented as appropriate)    07/18/17 1402   GI Endoscopy   Problems Assessed (GI Endoscopy) all   Problems Present (GI Endoscopy) none

## 2017-07-18 NOTE — PLAN OF CARE
Problem: Patient Care Overview (Adult)  Goal: Plan of Care Review  Outcome: Outcome(s) achieved Date Met:  07/18/17 07/18/17 1418   Coping/Psychosocial Response Interventions   Plan Of Care Reviewed With patient   Patient Care Overview   Progress no change   Outcome Evaluation   Outcome Summary/Follow up Plan vss         Problem: GI Endoscopy (Adult)  Goal: Signs and Symptoms of Listed Potential Problems Will be Absent or Manageable (GI Endoscopy)  Outcome: Outcome(s) achieved Date Met:  07/18/17 07/18/17 1418   GI Endoscopy   Problems Assessed (GI Endoscopy) all   Problems Present (GI Endoscopy) none

## 2017-07-18 NOTE — H&P (VIEW-ONLY)
Chief Complaint: Need for Colonoscopy    Giselle Valdes is a 56 y.o. female here today to arrange surveillance colonoscopy.   She underwent right colon resection last year for large villous adenoma.    Prior Colonoscopy:yes  Prior Polyps:yes  Family History of Colon Cancer:no  On anticoagulation/antiplatelets:no    Past Surgical History:   Procedure Laterality Date   • BREAST BIOPSY  02/06/2001    left ,breast mass,lobular carcinoma   • BREAST RECONSTRUCTION  10/09/2001    Bi;ateral nipple/areolar reconstruction using C-V flaps and full thickness skin grafts from groin.Acquired absence of bilateral breast   • CHOLECYSTECTOMY  06/01/2016    Laparoscopic right colon resectoin with ileocolonic anastomosis   • COLONOSCOPY  06/01/2016    Laparoscopic right colon resectoin with ileocolonic anastomosis. 06/01/2016    n/a One 13 mm polyp at 90 cm prox. to the anus. Resected and retrieved.Normal colon 2 polyps.   • ENDOSCOPY  04/10/2001    Hiatal hernia,Mild esophagitis,Due to the coarsening of the pancreas on ultrasound,will need to do a CT of the abdomen   • HAND SURGERY  11/21/1997    excision thumb lesion, MP joint    • INJECTION OF MEDICATION  09/30/2014    celestone(2)   • INJECTION OF MEDICATION  06/25/2013    DEXAMETHASONE(9)   • INJECTION OF MEDICATION  01/29/2012    SOLU-MEDROL (1)   • KNEE ARTHROSCOPY  03/01/2001    right,tear lateral meniscus   • MASTECTOMY  03/01/2001    left modified radical mastectomy,right total,varcinoma of the left breast,with lobular carcinoma in situ   • SKIN BIOPSY  08/03/1995    right,cheek lesion ,solar degeneration with sebaceous gland hyperplasia,     Past Medical History:   Diagnosis Date   • Acute maxillary sinusitis 01/14/2014   • Anorectal pain 05/03/2016   • Astigmatism 12/03/2014   • Bunion [id number]   • Chronic back pain 12/02/2015     low back and othes from RA      • Constipation 03/24/2016   • Cramp in limb 01/04/2016    lower   • Depressive disorder 08/12/2015   •  Disorder of gallbladder 07/01/2015    gallstones, 6mm wall, 4 mm cbd      • Gastroesophageal reflux disease 01/25/2010   • H/O echocardiogram 04/10/2001    This is a limited study ,however the LV appears to be functioning normally with an estimated fraction of 60-65%   • History of colonic polyps 06/28/2016   • Hypermetropia 12/03/2014   • Low back pain 06/14/2016    spinal stenosis on MRI Seeing korina Yen      • Neck pain 08/12/2015   • Neuropathy 08/12/2015   • Peripheral nervous system disorder 12/07/2012   • Primary malignant neoplasm of breast 11/22/2012    no recurrence noted      • Rheumatoid arthritis 04/01/2016   • Tobacco dependence syndrome 11/22/2012     Social History     Social History   • Marital status:      Spouse name: N/A   • Number of children: N/A   • Years of education: N/A     Occupational History   • Not on file.     Social History Main Topics   • Smoking status: Current Every Day Smoker     Packs/day: 1.00   • Smokeless tobacco: Never Used   • Alcohol use No   • Drug use: No   • Sexual activity: Not on file     Other Topics Concern   • Not on file     Social History Narrative     Current Outpatient Prescriptions on File Prior to Visit   Medication Sig Dispense Refill   • Abatacept (ORENCIA) 125 MG/ML solution prefilled syringe Inject 125 mg under the skin 1 (One) Time Per Week.     • amLODIPine (NORVASC) 5 MG tablet TAKE 1 TABLET EVERY DAY 30 tablet 5   • atenolol (TENORMIN) 50 MG tablet Take 1 tablet by mouth Daily. 30 tablet 11   • cyclobenzaprine (FLEXERIL) 10 MG tablet Take 1 tablet by mouth 3 (Three) Times a Day As Needed for Muscle Spasms. 30 tablet 11   • DULoxetine (CYMBALTA) 60 MG capsule TAKE 1 CAPSULE BY MOUTH DAILY 30 capsule 11   • HYDROcodone-acetaminophen (NORCO)  MG per tablet Take 1 tablet by mouth Every 6 (Six) Hours As Needed for Moderate Pain (4-6). 120 tablet 0   • losartan (COZAAR) 100 MG tablet Take 1 tablet by mouth Daily. 30 tablet 11   •  omeprazole (priLOSEC) 20 MG capsule TAKE 1 CAPSULE EVERY DAY 60 capsule 5     No current facility-administered medications on file prior to visit.          Review of Systems   Constitutional: Negative for appetite change, chills, fever and unexpected weight change.   HENT: Negative for hearing loss, nosebleeds and trouble swallowing.    Eyes: Negative for visual disturbance.   Respiratory: Negative for apnea, cough, choking, chest tightness, shortness of breath, wheezing and stridor.    Cardiovascular: Negative for chest pain, palpitations and leg swelling.   Gastrointestinal: Negative for abdominal distention, abdominal pain, blood in stool, constipation, diarrhea, nausea and vomiting.   Endocrine: Negative for cold intolerance, heat intolerance, polydipsia, polyphagia and polyuria.   Genitourinary: Negative for difficulty urinating, dysuria, frequency, hematuria and urgency.   Musculoskeletal: Positive for arthralgias. Negative for back pain, myalgias and neck pain.   Skin: Negative for color change, pallor and rash.   Allergic/Immunologic: Negative for immunocompromised state.   Neurological: Negative for dizziness, seizures, syncope, light-headedness, numbness and headaches.   Hematological: Negative for adenopathy.   Psychiatric/Behavioral: Negative for suicidal ideas. The patient is not nervous/anxious.        Vitals:    06/27/17 0856   BP: 124/70       Physical Exam:       General Appearance:    Alert, cooperative, in no acute distress   Head:    Normocephalic, without obvious abnormality, atraumatic   Eyes:            Lids and lashes normal, conjunctivae and sclerae normal, no   icterus, no pallor, corneas clear, PERRLA   Ears:    Ears appear intact with no abnormalities noted   Throat:   No oral lesions, no thrush, oral mucosa moist   Neck:   No adenopathy, supple, trachea midline, no thyromegaly, no   carotid bruit, no JVD   Back:     No kyphosis present, no scoliosis present, no skin lesions,       erythema or scars, no tenderness to percussion or                   palpation, range of motion normal   Lungs:     Clear to auscultation,respirations regular, even and                  unlabored    Heart:    Regular rhythm and normal rate, normal S1 and S2, no            murmur, no gallop, no rub, no click   Chest Wall:    No abnormalities observed   Abdomen:     Normal bowel sounds, no masses, no organomegaly, soft        non-tender, non-distended, no guarding, no rebound                Tenderness, well healed scar   Extremities:   Moves all extremities well, no edema, no cyanosis, no             redness   Pulses:   Pulses palpable and equal bilaterally   Skin:   No bleeding, bruising or rash   Lymph nodes:   No palpable adenopathy   Neurologic:   Cranial nerves 2 - 12 grossly intact, sensation intact, DTR       present and equal bilaterally       Assessment     In need of surveillance colonoscopy.    Plan     Risks, benefits, rationale and prep for colonoscopy have been discussed with the patient.  The patient indicates understanding of these issues and agrees with the plan.  Prep with magnesium citrate and dulcolax per her wishes.  Hold Orencia the week prior.          This document has been electronically signed by Brandyn Givens MD on June 27, 2017 9:27 AM

## 2017-07-20 LAB
LAB AP CASE REPORT: NORMAL
Lab: NORMAL
PATH REPORT.FINAL DX SPEC: NORMAL
PATH REPORT.GROSS SPEC: NORMAL

## 2017-07-25 ENCOUNTER — OFFICE VISIT (OUTPATIENT)
Dept: SURGERY | Facility: CLINIC | Age: 57
End: 2017-07-25

## 2017-07-25 VITALS
SYSTOLIC BLOOD PRESSURE: 146 MMHG | DIASTOLIC BLOOD PRESSURE: 88 MMHG | WEIGHT: 184 LBS | HEIGHT: 65 IN | BODY MASS INDEX: 30.66 KG/M2

## 2017-07-25 DIAGNOSIS — Z86.010 HISTORY OF COLONIC POLYPS: Primary | ICD-10-CM

## 2017-07-25 PROCEDURE — 99212 OFFICE O/P EST SF 10 MIN: CPT | Performed by: SURGERY

## 2017-07-25 NOTE — PROGRESS NOTES
CHIEF COMPLAINT:    Chief Complaint   Patient presents with   • Follow-up     Endo results.       HISTORY OF PRESENT ILLNESS:    Giselle Valdes is a 56 y.o. female who underwent colonoscopy last week for history of right colon resection for a villous adenoma.  She had a hyperplastic polyp and inflammatory polyp removed. She is well today.    EXAM:  Vitals:    07/25/17 0946   BP: 146/88         Abdomen soft    ASSESSMENT:    History of Colonic Polyps, villous adenoma previously    PLAN:    Overall doing well.  Will need colonoscopy in 5 years.          This document has been electronically signed by Brandyn Givens MD on July 25, 2017 10:57 AM

## 2017-07-27 ENCOUNTER — OFFICE VISIT (OUTPATIENT)
Dept: FAMILY MEDICINE CLINIC | Facility: CLINIC | Age: 57
End: 2017-07-27

## 2017-07-27 VITALS
HEART RATE: 92 BPM | SYSTOLIC BLOOD PRESSURE: 132 MMHG | OXYGEN SATURATION: 98 % | HEIGHT: 65 IN | BODY MASS INDEX: 30.41 KG/M2 | WEIGHT: 182.5 LBS | DIASTOLIC BLOOD PRESSURE: 78 MMHG

## 2017-07-27 DIAGNOSIS — C50.919 PRIMARY MALIGNANT NEOPLASM OF BREAST, UNSPECIFIED LATERALITY (HCC): Primary | ICD-10-CM

## 2017-07-27 DIAGNOSIS — G89.29 CHRONIC MIDLINE LOW BACK PAIN WITH LEFT-SIDED SCIATICA: ICD-10-CM

## 2017-07-27 DIAGNOSIS — M54.42 CHRONIC MIDLINE LOW BACK PAIN WITH LEFT-SIDED SCIATICA: ICD-10-CM

## 2017-07-27 PROCEDURE — 99213 OFFICE O/P EST LOW 20 MIN: CPT | Performed by: FAMILY MEDICINE

## 2017-07-27 RX ORDER — HYDROCODONE BITARTRATE AND ACETAMINOPHEN 10; 325 MG/1; MG/1
1 TABLET ORAL EVERY 6 HOURS PRN
Qty: 120 TABLET | Refills: 0 | Status: SHIPPED | OUTPATIENT
Start: 2017-07-27 | End: 2017-08-24 | Stop reason: SDUPTHER

## 2017-07-27 RX ORDER — HYDROCODONE BITARTRATE AND ACETAMINOPHEN 10; 325 MG/1; MG/1
1 TABLET ORAL EVERY 6 HOURS PRN
Qty: 120 TABLET | Refills: 0 | Status: SHIPPED | OUTPATIENT
Start: 2017-07-27 | End: 2017-07-27 | Stop reason: SDUPTHER

## 2017-07-27 NOTE — PROGRESS NOTES
Subjective   Giselle Valdes is a 56 y.o. female.     Back Pain   This is a chronic problem. The current episode started more than 1 year ago. The problem has been gradually worsening since onset. The pain is present in the lumbar spine. The quality of the pain is described as aching and shooting. The pain radiates to the left foot. The pain is at a severity of 6/10. The pain is moderate. The pain is worse during the night. The symptoms are aggravated by standing. Stiffness is present in the morning. Pertinent negatives include no bladder incontinence, bowel incontinence or fever.        The following portions of the patient's history were reviewed and updated as appropriate: allergies, current medications, past family history, past medical history, past social history, past surgical history and problem list.    Review of Systems   Constitutional: Negative for fever.   Gastrointestinal: Negative for bowel incontinence.   Genitourinary: Negative for bladder incontinence.   Musculoskeletal: Positive for back pain.       Objective   Physical Exam   Constitutional: She is oriented to person, place, and time. She appears well-developed and well-nourished. No distress.   HENT:   Head: Normocephalic and atraumatic.   Cardiovascular: Normal rate, regular rhythm and normal heart sounds.    Pulmonary/Chest: Effort normal and breath sounds normal. No respiratory distress. She has no wheezes. She has no rales. She exhibits no tenderness.   Musculoskeletal:        Lumbar back: She exhibits decreased range of motion, tenderness and pain. She exhibits no bony tenderness, no swelling, no edema, no deformity, no laceration and no spasm.   Neurological: She is alert and oriented to person, place, and time.   Reflex Scores:       Patellar reflexes are 2+ on the right side and 2+ on the left side.  Skin: Skin is warm and dry. She is not diaphoretic.   Psychiatric: She has a normal mood and affect. Her behavior is normal. Judgment  and thought content normal.   Nursing note and vitals reviewed.      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

## 2017-08-24 ENCOUNTER — OFFICE VISIT (OUTPATIENT)
Dept: FAMILY MEDICINE CLINIC | Facility: CLINIC | Age: 57
End: 2017-08-24

## 2017-08-24 VITALS
DIASTOLIC BLOOD PRESSURE: 82 MMHG | WEIGHT: 181.1 LBS | BODY MASS INDEX: 30.17 KG/M2 | OXYGEN SATURATION: 98 % | HEIGHT: 65 IN | HEART RATE: 85 BPM | SYSTOLIC BLOOD PRESSURE: 134 MMHG

## 2017-08-24 DIAGNOSIS — M06.9 RHEUMATOID ARTHRITIS OF HAND, UNSPECIFIED LATERALITY, UNSPECIFIED RHEUMATOID FACTOR PRESENCE: Primary | ICD-10-CM

## 2017-08-24 DIAGNOSIS — G89.29 CHRONIC MIDLINE LOW BACK PAIN WITH LEFT-SIDED SCIATICA: ICD-10-CM

## 2017-08-24 DIAGNOSIS — M54.42 CHRONIC MIDLINE LOW BACK PAIN WITH LEFT-SIDED SCIATICA: ICD-10-CM

## 2017-08-24 DIAGNOSIS — L30.9 ECZEMA OF RIGHT HAND: ICD-10-CM

## 2017-08-24 PROCEDURE — 99214 OFFICE O/P EST MOD 30 MIN: CPT | Performed by: FAMILY MEDICINE

## 2017-08-24 RX ORDER — TRIAMCINOLONE ACETONIDE 1 MG/G
CREAM TOPICAL 2 TIMES DAILY
Qty: 15 G | Refills: 1 | Status: SHIPPED | OUTPATIENT
Start: 2017-08-24 | End: 2017-10-24

## 2017-08-24 RX ORDER — HYDROCODONE BITARTRATE AND ACETAMINOPHEN 10; 325 MG/1; MG/1
1 TABLET ORAL EVERY 6 HOURS PRN
Qty: 120 TABLET | Refills: 0 | Status: SHIPPED | OUTPATIENT
Start: 2017-08-24 | End: 2017-09-21 | Stop reason: SDUPTHER

## 2017-08-24 NOTE — PROGRESS NOTES
Subjective   Giselle Valdes is a 57 y.o. female.     Back Pain   This is a chronic problem. The current episode started more than 1 year ago. The problem has been gradually worsening since onset. The pain is present in the lumbar spine. The quality of the pain is described as aching and shooting. The pain radiates to the left foot. The pain is at a severity of 6/10. The pain is moderate. The pain is worse during the night. The symptoms are aggravated by standing. Stiffness is present in the morning. Pertinent negatives include no bladder incontinence, bowel incontinence, fever, numbness or tingling.   Knee Pain    The incident occurred more than 1 week ago. There was no injury mechanism. The pain is present in the right knee. The quality of the pain is described as aching. The pain is at a severity of 7/10. The pain is moderate. The pain has been fluctuating since onset. Associated symptoms include muscle weakness. Pertinent negatives include no loss of motion, numbness or tingling.   Rash   This is a new problem. The current episode started 1 to 4 weeks ago. The problem has been waxing and waning since onset. The affected locations include the right hand. The rash is characterized by pain. Pertinent negatives include no fever.        The following portions of the patient's history were reviewed and updated as appropriate: allergies, current medications, past family history, past medical history, past social history, past surgical history and problem list.    Review of Systems   Constitutional: Negative for fever.   Gastrointestinal: Negative for bowel incontinence.   Genitourinary: Negative for bladder incontinence.   Musculoskeletal: Positive for back pain.   Skin: Positive for rash.   Neurological: Negative for tingling and numbness.       Objective   Physical Exam   Constitutional: She is oriented to person, place, and time. She appears well-developed and well-nourished. No distress.   HENT:   Head:  Normocephalic and atraumatic.   Pulmonary/Chest: Effort normal and breath sounds normal.   Musculoskeletal:        Right knee: She exhibits decreased range of motion and swelling. Tenderness found.        Lumbar back: She exhibits decreased range of motion, tenderness and pain. She exhibits no bony tenderness, no swelling, no edema, no deformity, no laceration and no spasm.   Neurological: She is alert and oriented to person, place, and time.   Reflex Scores:       Patellar reflexes are 2+ on the right side and 2+ on the left side.  Skin: Skin is warm and dry. Rash noted. She is not diaphoretic.        Psychiatric: She has a normal mood and affect. Her behavior is normal. Judgment and thought content normal.   Nursing note and vitals reviewed.      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

## 2017-09-21 ENCOUNTER — OFFICE VISIT (OUTPATIENT)
Dept: FAMILY MEDICINE CLINIC | Facility: CLINIC | Age: 57
End: 2017-09-21

## 2017-09-21 VITALS
HEART RATE: 82 BPM | BODY MASS INDEX: 30.22 KG/M2 | SYSTOLIC BLOOD PRESSURE: 136 MMHG | WEIGHT: 181.4 LBS | OXYGEN SATURATION: 97 % | DIASTOLIC BLOOD PRESSURE: 78 MMHG | HEIGHT: 65 IN

## 2017-09-21 DIAGNOSIS — M06.9 RHEUMATOID ARTHRITIS OF HAND, UNSPECIFIED LATERALITY, UNSPECIFIED RHEUMATOID FACTOR PRESENCE: Primary | ICD-10-CM

## 2017-09-21 DIAGNOSIS — M54.42 CHRONIC MIDLINE LOW BACK PAIN WITH LEFT-SIDED SCIATICA: ICD-10-CM

## 2017-09-21 DIAGNOSIS — G89.29 CHRONIC MIDLINE LOW BACK PAIN WITH LEFT-SIDED SCIATICA: ICD-10-CM

## 2017-09-21 DIAGNOSIS — Z23 FLU VACCINE NEED: ICD-10-CM

## 2017-09-21 PROCEDURE — 90471 IMMUNIZATION ADMIN: CPT | Performed by: FAMILY MEDICINE

## 2017-09-21 PROCEDURE — 99213 OFFICE O/P EST LOW 20 MIN: CPT | Performed by: FAMILY MEDICINE

## 2017-09-21 PROCEDURE — 90686 IIV4 VACC NO PRSV 0.5 ML IM: CPT | Performed by: FAMILY MEDICINE

## 2017-09-21 RX ORDER — HYDROCODONE BITARTRATE AND ACETAMINOPHEN 10; 325 MG/1; MG/1
1 TABLET ORAL EVERY 6 HOURS PRN
Qty: 120 TABLET | Refills: 0 | Status: SHIPPED | OUTPATIENT
Start: 2017-09-21 | End: 2017-10-19 | Stop reason: SDUPTHER

## 2017-09-21 NOTE — PROGRESS NOTES
Subjective   Giselle Valdes is a 57 y.o. female.     Arthritis   Pertinent negatives include no fever.   Back Pain   This is a chronic problem. The current episode started more than 1 year ago. The problem has been gradually worsening since onset. The pain is present in the lumbar spine. The quality of the pain is described as aching and shooting. The pain radiates to the left foot. The pain is at a severity of 7/10. The pain is moderate. The pain is worse during the night. The symptoms are aggravated by standing. Stiffness is present in the morning. Pertinent negatives include no bladder incontinence, bowel incontinence or fever.        The following portions of the patient's history were reviewed and updated as appropriate: allergies, current medications, past family history, past medical history, past social history, past surgical history and problem list.    Review of Systems   Constitutional: Negative for fever.   Gastrointestinal: Negative for bowel incontinence.   Genitourinary: Negative for bladder incontinence.   Musculoskeletal: Positive for arthritis and back pain.       Objective   Physical Exam   Constitutional: She is oriented to person, place, and time. She appears well-developed and well-nourished. No distress.   HENT:   Head: Normocephalic and atraumatic.   Cardiovascular: Normal rate, regular rhythm and normal heart sounds.    Pulmonary/Chest: Effort normal and breath sounds normal. No respiratory distress. She has no wheezes. She has no rales. She exhibits no tenderness.   Musculoskeletal:        Lumbar back: She exhibits decreased range of motion, tenderness and pain. She exhibits no bony tenderness, no swelling, no edema, no deformity, no laceration and no spasm.   Neurological: She is alert and oriented to person, place, and time.   Reflex Scores:       Patellar reflexes are 2+ on the right side and 2+ on the left side.  Skin: Skin is warm and dry. She is not diaphoretic.   Psychiatric: She  has a normal mood and affect. Her behavior is normal. Judgment and thought content normal.   Nursing note and vitals reviewed.      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

## 2017-10-19 ENCOUNTER — OFFICE VISIT (OUTPATIENT)
Dept: FAMILY MEDICINE CLINIC | Facility: CLINIC | Age: 57
End: 2017-10-19

## 2017-10-19 VITALS
BODY MASS INDEX: 29.97 KG/M2 | HEIGHT: 65 IN | HEART RATE: 78 BPM | SYSTOLIC BLOOD PRESSURE: 130 MMHG | WEIGHT: 179.9 LBS | DIASTOLIC BLOOD PRESSURE: 88 MMHG | OXYGEN SATURATION: 99 %

## 2017-10-19 DIAGNOSIS — M54.42 CHRONIC MIDLINE LOW BACK PAIN WITH LEFT-SIDED SCIATICA: ICD-10-CM

## 2017-10-19 DIAGNOSIS — F51.01 PRIMARY INSOMNIA: ICD-10-CM

## 2017-10-19 DIAGNOSIS — M06.9 RHEUMATOID ARTHRITIS OF HAND, UNSPECIFIED LATERALITY, UNSPECIFIED RHEUMATOID FACTOR PRESENCE: ICD-10-CM

## 2017-10-19 DIAGNOSIS — F32.A DEPRESSIVE DISORDER: ICD-10-CM

## 2017-10-19 DIAGNOSIS — G89.29 CHRONIC MIDLINE LOW BACK PAIN WITH LEFT-SIDED SCIATICA: ICD-10-CM

## 2017-10-19 DIAGNOSIS — I10 ESSENTIAL HYPERTENSION: Primary | ICD-10-CM

## 2017-10-19 PROCEDURE — 99214 OFFICE O/P EST MOD 30 MIN: CPT | Performed by: FAMILY MEDICINE

## 2017-10-19 RX ORDER — HYDROCODONE BITARTRATE AND ACETAMINOPHEN 10; 325 MG/1; MG/1
1 TABLET ORAL EVERY 6 HOURS PRN
Qty: 120 TABLET | Refills: 0 | Status: SHIPPED | OUTPATIENT
Start: 2017-10-19 | End: 2017-11-20 | Stop reason: SDUPTHER

## 2017-10-19 RX ORDER — TRAZODONE HYDROCHLORIDE 100 MG/1
100 TABLET ORAL NIGHTLY
Qty: 30 TABLET | Refills: 11 | Status: SHIPPED | OUTPATIENT
Start: 2017-10-19 | End: 2018-10-19 | Stop reason: SDUPTHER

## 2017-10-19 NOTE — PROGRESS NOTES
Subjective   Giselle Valdes is a 57 y.o. female.     Hypertension   This is a chronic (elevated Bp today) problem. The current episode started today. The problem has been waxing and waning since onset. The problem is controlled. Associated symptoms include headaches. Pertinent negatives include no chest pain, peripheral edema or PND. Agents associated with hypertension include steroids. Risk factors for coronary artery disease include family history. Past treatments include nothing. Compliance problems include diet.    Back Pain   This is a chronic problem. The current episode started more than 1 year ago. The problem has been gradually worsening since onset. The pain is present in the lumbar spine. The quality of the pain is described as aching and shooting. The pain radiates to the left foot. The pain is at a severity of 6/10. The pain is worse during the night. The symptoms are aggravated by standing. Stiffness is present in the morning. Associated symptoms include headaches. Pertinent negatives include no bladder incontinence, bowel incontinence, chest pain or fever. Paresthesias: right leg went ot sleep.   Depression   Visit Type: follow-up  Patient presents with the following symptoms: insomnia.  Patient is not experiencing: depressed mood and nervousness/anxiety.         The following portions of the patient's history were reviewed and updated as appropriate: allergies, current medications, past family history, past medical history, past social history, past surgical history and problem list.    Review of Systems   Constitutional: Negative for fever.   Cardiovascular: Negative for chest pain and PND.   Gastrointestinal: Negative for bowel incontinence.   Genitourinary: Negative for bladder incontinence.   Musculoskeletal: Positive for back pain.   Neurological: Positive for headaches. Paresthesias: right leg went ot sleep.   Psychiatric/Behavioral: The patient has insomnia. The patient is not  nervous/anxious.        Objective   Physical Exam   Constitutional: She is oriented to person, place, and time. She appears well-developed and well-nourished. No distress.   HENT:   Head: Normocephalic and atraumatic.   Cardiovascular: Normal rate, regular rhythm and normal heart sounds.    Pulmonary/Chest: Effort normal and breath sounds normal. No respiratory distress. She has no wheezes. She has no rales. She exhibits no tenderness.   Musculoskeletal:        Lumbar back: She exhibits decreased range of motion, tenderness and pain. She exhibits no bony tenderness, no swelling, no edema, no deformity, no laceration and no spasm.   Neurological: She is oriented to person, place, and time. She is unresponsive.   Reflex Scores:       Patellar reflexes are 2+ on the right side and 2+ on the left side.  Skin: Skin is warm and dry. She is not diaphoretic.   Psychiatric: She has a normal mood and affect. Her behavior is normal. Judgment and thought content normal.   Nursing note and vitals reviewed.      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

## 2017-10-23 ENCOUNTER — TELEPHONE (OUTPATIENT)
Dept: FAMILY MEDICINE CLINIC | Facility: CLINIC | Age: 57
End: 2017-10-23

## 2017-10-23 NOTE — TELEPHONE ENCOUNTER
Pt. Called advising she had been having elevated BP all weekend. Woke up this AM to a headache and elevated BP. She made an appt to come see Dr. Trammell tomorrow

## 2017-10-23 NOTE — TELEPHONE ENCOUNTER
----- Message from Sheryl Ball sent at 10/23/2017  1:41 PM CDT -----  Contact: PT CALLED  Pt called and said that she has been having BP issues all weekend. This morning at 6:00 am the bottom number was 101 after she took her blood pressure pill it came down to 94. Phone is [phone].

## 2017-10-24 ENCOUNTER — OFFICE VISIT (OUTPATIENT)
Dept: FAMILY MEDICINE CLINIC | Facility: CLINIC | Age: 57
End: 2017-10-24

## 2017-10-24 VITALS
DIASTOLIC BLOOD PRESSURE: 90 MMHG | SYSTOLIC BLOOD PRESSURE: 143 MMHG | HEIGHT: 65 IN | HEART RATE: 92 BPM | BODY MASS INDEX: 30.1 KG/M2 | WEIGHT: 180.7 LBS | OXYGEN SATURATION: 96 %

## 2017-10-24 DIAGNOSIS — I10 UNCONTROLLED HYPERTENSION: Primary | ICD-10-CM

## 2017-10-24 PROCEDURE — 99214 OFFICE O/P EST MOD 30 MIN: CPT | Performed by: FAMILY MEDICINE

## 2017-10-24 RX ORDER — ATENOLOL 100 MG/1
100 TABLET ORAL DAILY
Qty: 30 TABLET | Refills: 11 | Status: SHIPPED | OUTPATIENT
Start: 2017-10-24 | End: 2018-10-19 | Stop reason: SDUPTHER

## 2017-10-24 NOTE — PATIENT INSTRUCTIONS
"Low-Sodium Eating Plan  Sodium raises blood pressure and causes water to be held in the body. Getting less sodium from food will help lower your blood pressure, reduce any swelling, and protect your heart, liver, and kidneys. We get sodium by adding salt (sodium chloride) to food. Most of our sodium comes from canned, boxed, and frozen foods. Restaurant foods, fast foods, and pizza are also very high in sodium. Even if you take medicine to lower your blood pressure or to reduce fluid in your body, getting less sodium from your food is important.  WHAT IS MY PLAN?  Most people should limit their sodium intake to 2,300 mg a day. Your health care provider recommends that you limit your sodium intake to __________ a day.   WHAT DO I NEED TO KNOW ABOUT THIS EATING PLAN?  For the low-sodium eating plan, you will follow these general guidelines:  · Choose foods with a % Daily Value for sodium of less than 5% (as listed on the food label).    · Use salt-free seasonings or herbs instead of table salt or sea salt.    · Check with your health care provider or pharmacist before using salt substitutes.    · Eat fresh foods.  · Eat more vegetables and fruits.  · Limit canned vegetables. If you do use them, rinse them well to decrease the sodium.    · Limit cheese to 1 oz (28 g) per day.     · Eat lower-sodium products, often labeled as \"lower sodium\" or \"no salt added.\"  · Avoid foods that contain monosodium glutamate (MSG). MSG is sometimes added to Chinese food and some canned foods.    · Check food labels (Nutrition Facts labels) on foods to learn how much sodium is in one serving.  · Eat more home-cooked food and less restaurant, buffet, and fast food.   · When eating at a restaurant, ask that your food be prepared with less salt, or no salt if possible.    HOW DO I READ FOOD LABELS FOR SODIUM INFORMATION?  The Nutrition Facts label lists the amount of sodium in one serving of the food. If you eat more than one serving, you " must multiply the listed amount of sodium by the number of servings.  Food labels may also identify foods as:  · Sodium free--Less than 5 mg in a serving.  · Very low sodium--35 mg or less in a serving.  · Low sodium--140 mg or less in a serving.  · Light in sodium--50% less sodium in a serving. For example, if a food that usually has 300 mg of sodium is changed to become light in sodium, it will have 150 mg of sodium.  · Reduced sodium--25% less sodium in a serving. For example, if a food that usually has 400 mg of sodium is changed to reduced sodium, it will have 300 mg of sodium.  WHAT FOODS CAN I EAT?  Grains   Low-sodium cereals, including oats, puffed wheat and rice, and shredded wheat cereals. Low-sodium crackers. Unsalted rice and pasta. Lower-sodium bread.   Vegetables   Frozen or fresh vegetables. Low-sodium or reduced-sodium canned vegetables. Low-sodium or reduced-sodium tomato sauce and paste. Low-sodium or reduced-sodium tomato and vegetable juices.   Fruits   Fresh, frozen, and canned fruit. Fruit juice.   Meat and Other Protein Products   Low-sodium canned tuna and salmon. Fresh or frozen meat, poultry, seafood, and fish. Lamb. Unsalted nuts. Dried beans, peas, and lentils without added salt. Unsalted canned beans. Homemade soups without salt. Eggs.   Dairy   Milk. Soy milk. Ricotta cheese. Low-sodium or reduced-sodium cheeses. Yogurt.   Condiments   Fresh and dried herbs and spices. Salt-free seasonings. Onion and garlic powders. Low-sodium varieties of mustard and ketchup. Fresh or refrigerated horseradish. Lemon juice.   Fats and Oils    Reduced-sodium salad dressings. Unsalted butter.    Other   Unsalted popcorn and pretzels.   The items listed above may not be a complete list of recommended foods or beverages. Contact your dietitian for more options.  WHAT FOODS ARE NOT RECOMMENDED?  Grains   Instant hot cereals. Bread stuffing, pancake, and biscuit mixes. Croutons. Seasoned rice or pasta mixes.  Noodle soup cups. Boxed or frozen macaroni and cheese. Self-rising flour. Regular salted crackers.  Vegetables   Regular canned vegetables. Regular canned tomato sauce and paste. Regular tomato and vegetable juices. Frozen vegetables in sauces. Salted French fries. Olives. Pickles. Relishes. Sauerkraut. Salsa.  Meat and Other Protein Products   Salted, canned, smoked, spiced, or pickled meats, seafood, or fish. Walker, ham, sausage, hot dogs, corned beef, chipped beef, and packaged luncheon meats. Salt pork. Jerky. Pickled herring. Anchovies, regular canned tuna, and sardines. Salted nuts.  Dairy   Processed cheese and cheese spreads. Cheese curds. Blue cheese and cottage cheese. Buttermilk.   Condiments   Onion and garlic salt, seasoned salt, table salt, and sea salt. Canned and packaged gravies. Worcestershire sauce. Tartar sauce. Barbecue sauce. Teriyaki sauce. Soy sauce, including reduced sodium. Steak sauce. Fish sauce. Oyster sauce. Cocktail sauce. Horseradish that you find on the shelf. Regular ketchup and mustard. Meat flavorings and tenderizers. Bouillon cubes. Hot sauce. Tabasco sauce. Marinades. Taco seasonings. Relishes.  Fats and Oils    Regular salad dressings. Salted butter. Margarine. Ghee. Walker fat.   Other   Potato and tortilla chips. Corn chips and puffs. Salted popcorn and pretzels. Canned or dried soups. Pizza. Frozen entrees and pot pies.    The items listed above may not be a complete list of foods and beverages to avoid. Contact your dietitian for more information.     This information is not intended to replace advice given to you by your health care provider. Make sure you discuss any questions you have with your health care provider.     Document Released: 06/09/2003 Document Revised: 01/08/2016 Document Reviewed: 10/22/2014  [employer] Patient Education ©2017 [employer]

## 2017-10-24 NOTE — PROGRESS NOTES
Subjective   Giselle Valdes is a 57 y.o. female.     Hypertension   This is a chronic problem. The current episode started more than 1 year ago. The problem has been waxing and waning since onset. The problem is uncontrolled. Associated symptoms include headaches. Pertinent negatives include no chest pain, orthopnea, peripheral edema, PND or shortness of breath. (Tingling face all over)        The following portions of the patient's history were reviewed and updated as appropriate: allergies, current medications, past family history, past medical history, past social history, past surgical history and problem list.    Review of Systems   Respiratory: Negative for shortness of breath.    Cardiovascular: Negative for chest pain, orthopnea and PND.   Neurological: Positive for headaches.       Objective   Physical Exam   Constitutional: She is oriented to person, place, and time. She appears well-developed and well-nourished. No distress.   HENT:   Head: Normocephalic and atraumatic.   Cardiovascular: Normal rate, regular rhythm and normal heart sounds.  Exam reveals no gallop and no friction rub.    No murmur heard.  Pulmonary/Chest: Effort normal and breath sounds normal. No respiratory distress. She has no wheezes. She has no rales. She exhibits no tenderness.   Abdominal: Soft. Bowel sounds are normal. She exhibits no abdominal bruit. There is no tenderness.   Neurological: She is alert and oriented to person, place, and time.   Skin: Skin is warm and dry. She is not diaphoretic.   Psychiatric: She has a normal mood and affect. Her behavior is normal. Judgment and thought content normal.   Nursing note and vitals reviewed.      Assessment/Plan   Problems Addressed this Visit        Cardiovascular and Mediastinum    Uncontrolled hypertension - Primary    Relevant Medications    atenolol (TENORMIN) 100 MG tablet    Other Relevant Orders    Duplex Renal Artery - Bilateral Complete CAR               call bp daily

## 2017-10-27 RX ORDER — AMLODIPINE BESYLATE 10 MG/1
TABLET ORAL
Qty: 30 TABLET | Refills: 3 | Status: SHIPPED | OUTPATIENT
Start: 2017-10-27 | End: 2018-02-12 | Stop reason: SDUPTHER

## 2017-10-27 RX ORDER — AMLODIPINE BESYLATE 10 MG/1
TABLET ORAL
Qty: 30 TABLET | Refills: 3 | Status: SHIPPED | OUTPATIENT
Start: 2017-10-27 | End: 2017-10-27 | Stop reason: SDUPTHER

## 2017-11-08 ENCOUNTER — TELEPHONE (OUTPATIENT)
Dept: FAMILY MEDICINE CLINIC | Facility: CLINIC | Age: 57
End: 2017-11-08

## 2017-11-08 NOTE — TELEPHONE ENCOUNTER
Pr Dr. Trammell, Ms. Valdes has been called with her recent Duplex Renal Artery results & recommendations.  Continue her current medications and follow-up as planned or sooner if any problems.      ----- Message from Mikal Trammell MD sent at 11/7/2017  4:17 PM CST -----  Normal by radiology report

## 2017-11-14 RX ORDER — LOSARTAN POTASSIUM 100 MG/1
TABLET ORAL
Qty: 30 TABLET | Refills: 11 | Status: SHIPPED | OUTPATIENT
Start: 2017-11-14 | End: 2018-11-23 | Stop reason: SDUPTHER

## 2017-11-20 ENCOUNTER — OFFICE VISIT (OUTPATIENT)
Dept: FAMILY MEDICINE CLINIC | Facility: CLINIC | Age: 57
End: 2017-11-20

## 2017-11-20 VITALS
HEART RATE: 64 BPM | WEIGHT: 181 LBS | HEIGHT: 65 IN | BODY MASS INDEX: 30.16 KG/M2 | DIASTOLIC BLOOD PRESSURE: 80 MMHG | SYSTOLIC BLOOD PRESSURE: 120 MMHG

## 2017-11-20 DIAGNOSIS — M54.2 NECK PAIN: ICD-10-CM

## 2017-11-20 DIAGNOSIS — G89.29 CHRONIC MIDLINE LOW BACK PAIN WITH LEFT-SIDED SCIATICA: ICD-10-CM

## 2017-11-20 DIAGNOSIS — M06.9 RHEUMATOID ARTHRITIS OF HAND, UNSPECIFIED LATERALITY, UNSPECIFIED RHEUMATOID FACTOR PRESENCE: Primary | ICD-10-CM

## 2017-11-20 DIAGNOSIS — M54.42 CHRONIC MIDLINE LOW BACK PAIN WITH LEFT-SIDED SCIATICA: ICD-10-CM

## 2017-11-20 PROCEDURE — 99214 OFFICE O/P EST MOD 30 MIN: CPT | Performed by: FAMILY MEDICINE

## 2017-11-20 RX ORDER — CYCLOBENZAPRINE HCL 10 MG
10 TABLET ORAL 3 TIMES DAILY PRN
Qty: 90 TABLET | Refills: 11 | Status: SHIPPED | OUTPATIENT
Start: 2017-11-20 | End: 2019-04-05 | Stop reason: SDUPTHER

## 2017-11-20 RX ORDER — HYDROCODONE BITARTRATE AND ACETAMINOPHEN 10; 325 MG/1; MG/1
1 TABLET ORAL EVERY 6 HOURS PRN
Qty: 120 TABLET | Refills: 0 | Status: SHIPPED | OUTPATIENT
Start: 2017-11-20 | End: 2017-12-18 | Stop reason: SDUPTHER

## 2017-11-20 NOTE — PROGRESS NOTES
Subjective   Giselle Valdes is a 57 y.o. female.     Arthritis   Pertinent negatives include no fever.   Back Pain   This is a chronic problem. The current episode started more than 1 year ago. The problem has been gradually worsening since onset. The pain is present in the lumbar spine. The quality of the pain is described as aching and shooting. The pain radiates to the left foot. The pain is at a severity of 7/10. The pain is moderate. The pain is worse during the night. The symptoms are aggravated by standing. Stiffness is present in the morning. Pertinent negatives include no bladder incontinence, bowel incontinence or fever.   Neck Pain    This is a chronic problem. The current episode started more than 1 year ago. The problem occurs constantly. The problem has been gradually worsening. The pain is associated with nothing. The pain is present in the occipital region. The pain is at a severity of 8/10. Pertinent negatives include no fever.        The following portions of the patient's history were reviewed and updated as appropriate: allergies, current medications, past family history, past medical history, past social history, past surgical history and problem list.    Review of Systems   Constitutional: Negative for fever.   Gastrointestinal: Negative for bowel incontinence.   Genitourinary: Negative for bladder incontinence.   Musculoskeletal: Positive for arthritis, back pain and neck pain.   Psychiatric/Behavioral: Positive for decreased concentration.     Poor short term memory  Objective   Physical Exam   Constitutional: She is oriented to person, place, and time. She appears well-developed and well-nourished. No distress.   HENT:   Head: Normocephalic and atraumatic.   Pulmonary/Chest: Breath sounds normal.   Musculoskeletal:        Cervical back: She exhibits decreased range of motion, tenderness, pain and spasm. She exhibits no bony tenderness, no swelling, no edema, no deformity and no  laceration.        Lumbar back: She exhibits decreased range of motion, tenderness and pain. She exhibits no bony tenderness, no swelling, no edema, no deformity, no laceration and no spasm.   Neurological: She is alert and oriented to person, place, and time.   Reflex Scores:       Bicep reflexes are 2+ on the right side and 2+ on the left side.       Patellar reflexes are 2+ on the right side and 2+ on the left side.  Skin: Skin is warm and dry. She is not diaphoretic.   Psychiatric: She has a normal mood and affect. Her behavior is normal. Judgment and thought content normal. Cognition and memory are impaired (30/30 on MMSE). She exhibits normal recent memory and normal remote memory.   Nursing note and vitals reviewed.      Assessment/Plan   Problems Addressed this Visit        Nervous and Auditory    Chronic midline low back pain with left-sided sciatica (Chronic)       Musculoskeletal and Integument    Rheumatoid arthritis - Primary (Chronic)      Other Visit Diagnoses     Neck pain

## 2017-12-18 ENCOUNTER — LAB (OUTPATIENT)
Dept: LAB | Facility: HOSPITAL | Age: 57
End: 2017-12-18

## 2017-12-18 ENCOUNTER — TRANSCRIBE ORDERS (OUTPATIENT)
Dept: LAB | Facility: HOSPITAL | Age: 57
End: 2017-12-18

## 2017-12-18 ENCOUNTER — OFFICE VISIT (OUTPATIENT)
Dept: FAMILY MEDICINE CLINIC | Facility: CLINIC | Age: 57
End: 2017-12-18

## 2017-12-18 VITALS
BODY MASS INDEX: 30.27 KG/M2 | SYSTOLIC BLOOD PRESSURE: 138 MMHG | HEART RATE: 93 BPM | HEIGHT: 65 IN | WEIGHT: 181.7 LBS | DIASTOLIC BLOOD PRESSURE: 75 MMHG | OXYGEN SATURATION: 100 %

## 2017-12-18 DIAGNOSIS — M05.79 SEROPOSITIVE RHEUMATOID ARTHRITIS OF MULTIPLE SITES (HCC): ICD-10-CM

## 2017-12-18 DIAGNOSIS — Z79.899 ENCOUNTER FOR LONG-TERM (CURRENT) USE OF MEDICATIONS: ICD-10-CM

## 2017-12-18 DIAGNOSIS — M06.9 RHEUMATOID ARTHRITIS OF HAND, UNSPECIFIED LATERALITY, UNSPECIFIED RHEUMATOID FACTOR PRESENCE: Primary | ICD-10-CM

## 2017-12-18 DIAGNOSIS — M05.79 SEROPOSITIVE RHEUMATOID ARTHRITIS OF MULTIPLE SITES (HCC): Primary | ICD-10-CM

## 2017-12-18 DIAGNOSIS — M54.42 CHRONIC MIDLINE LOW BACK PAIN WITH LEFT-SIDED SCIATICA: ICD-10-CM

## 2017-12-18 DIAGNOSIS — G89.29 CHRONIC MIDLINE LOW BACK PAIN WITH LEFT-SIDED SCIATICA: ICD-10-CM

## 2017-12-18 LAB
AST SERPL-CCNC: 32 U/L (ref 14–36)
BASOPHILS # BLD AUTO: 0.06 10*3/MM3 (ref 0–0.2)
BASOPHILS NFR BLD AUTO: 0.6 % (ref 0–2)
CREAT BLD-MCNC: 0.93 MG/DL (ref 0.5–1)
DEPRECATED RDW RBC AUTO: 45.3 FL (ref 36.4–46.3)
EOSINOPHIL # BLD AUTO: 0.17 10*3/MM3 (ref 0–0.7)
EOSINOPHIL NFR BLD AUTO: 1.7 % (ref 0–7)
ERYTHROCYTE [DISTWIDTH] IN BLOOD BY AUTOMATED COUNT: 13.8 % (ref 11.5–14.5)
GFR SERPL CREATININE-BSD FRML MDRD: 62 ML/MIN/1.73
HCT VFR BLD AUTO: 40.8 % (ref 35–45)
HGB BLD-MCNC: 13.4 G/DL (ref 12–15.5)
IMM GRANULOCYTES # BLD: 0.05 10*3/MM3 (ref 0–0.02)
IMM GRANULOCYTES NFR BLD: 0.5 % (ref 0–0.5)
LYMPHOCYTES # BLD AUTO: 2.62 10*3/MM3 (ref 0.6–4.2)
LYMPHOCYTES NFR BLD AUTO: 26 % (ref 10–50)
MCH RBC QN AUTO: 29.8 PG (ref 26.5–34)
MCHC RBC AUTO-ENTMCNC: 32.8 G/DL (ref 31.4–36)
MCV RBC AUTO: 90.7 FL (ref 80–98)
MONOCYTES # BLD AUTO: 0.94 10*3/MM3 (ref 0–0.9)
MONOCYTES NFR BLD AUTO: 9.3 % (ref 0–12)
NEUTROPHILS # BLD AUTO: 6.25 10*3/MM3 (ref 2–8.6)
NEUTROPHILS NFR BLD AUTO: 61.9 % (ref 37–80)
PLATELET # BLD AUTO: 379 10*3/MM3 (ref 150–450)
PMV BLD AUTO: 9.1 FL (ref 8–12)
RBC # BLD AUTO: 4.5 10*6/MM3 (ref 3.77–5.16)
WBC NRBC COR # BLD: 10.09 10*3/MM3 (ref 3.2–9.8)

## 2017-12-18 PROCEDURE — 84450 TRANSFERASE (AST) (SGOT): CPT

## 2017-12-18 PROCEDURE — 82565 ASSAY OF CREATININE: CPT

## 2017-12-18 PROCEDURE — 85025 COMPLETE CBC W/AUTO DIFF WBC: CPT

## 2017-12-18 PROCEDURE — 36415 COLL VENOUS BLD VENIPUNCTURE: CPT

## 2017-12-18 PROCEDURE — 96372 THER/PROPH/DIAG INJ SC/IM: CPT | Performed by: FAMILY MEDICINE

## 2017-12-18 PROCEDURE — 99213 OFFICE O/P EST LOW 20 MIN: CPT | Performed by: FAMILY MEDICINE

## 2017-12-18 RX ORDER — TRIAMCINOLONE ACETONIDE 40 MG/ML
80 INJECTION, SUSPENSION INTRA-ARTICULAR; INTRAMUSCULAR ONCE
Status: COMPLETED | OUTPATIENT
Start: 2017-12-18 | End: 2017-12-18

## 2017-12-18 RX ORDER — HYDROCODONE BITARTRATE AND ACETAMINOPHEN 10; 325 MG/1; MG/1
1 TABLET ORAL EVERY 6 HOURS PRN
Qty: 120 TABLET | Refills: 0 | Status: SHIPPED | OUTPATIENT
Start: 2017-12-18 | End: 2018-01-18 | Stop reason: SDUPTHER

## 2017-12-18 RX ORDER — TRIAMCINOLONE ACETONIDE 40 MG/ML
40 INJECTION, SUSPENSION INTRA-ARTICULAR; INTRAMUSCULAR ONCE
Status: DISCONTINUED | OUTPATIENT
Start: 2017-12-18 | End: 2017-12-18

## 2017-12-18 RX ADMIN — TRIAMCINOLONE ACETONIDE 80 MG: 40 INJECTION, SUSPENSION INTRA-ARTICULAR; INTRAMUSCULAR at 14:06

## 2017-12-18 NOTE — PROGRESS NOTES
Subjective   Giselle Valdes is a 57 y.o. female.     Back Pain   This is a chronic problem. The current episode started more than 1 year ago. The problem is unchanged. The pain is present in the lumbar spine. The quality of the pain is described as aching and shooting. The pain radiates to the left foot. The pain is at a severity of 5/10. The pain is moderate. The pain is worse during the night. The symptoms are aggravated by standing. Stiffness is present in the morning. Pertinent negatives include no bladder incontinence, bowel incontinence or fever.        The following portions of the patient's history were reviewed and updated as appropriate: allergies, current medications, past family history, past medical history, past social history, past surgical history and problem list.    Review of Systems   Constitutional: Negative for fever.   Gastrointestinal: Negative for bowel incontinence.   Genitourinary: Negative for bladder incontinence.   Musculoskeletal: Positive for arthralgias and back pain.       Objective   Physical Exam   Constitutional: She is oriented to person, place, and time. She appears well-developed and well-nourished. No distress.   HENT:   Head: Normocephalic and atraumatic.   Pulmonary/Chest: Effort normal and breath sounds normal.   Musculoskeletal:        Lumbar back: She exhibits decreased range of motion, tenderness and pain. She exhibits no bony tenderness, no swelling, no edema, no deformity, no laceration and no spasm.        Arms:       Left hand: She exhibits decreased range of motion, tenderness and bony tenderness.   Neurological: She is alert and oriented to person, place, and time.   Reflex Scores:       Patellar reflexes are 2+ on the right side and 2+ on the left side.  Skin: Skin is warm and dry. She is not diaphoretic.   Psychiatric: She has a normal mood and affect. Her behavior is normal. Judgment and thought content normal.   Nursing note and vitals  reviewed.      Assessment/Plan   Problems Addressed this Visit        Nervous and Auditory    Chronic midline low back pain with left-sided sciatica (Chronic)    Relevant Medications    triamcinolone acetonide (KENALOG-40) injection 80 mg (Start on 12/18/2017  2:30 PM)       Musculoskeletal and Integument    Rheumatoid arthritis - Primary (Chronic)

## 2018-01-15 RX ORDER — OMEPRAZOLE 20 MG/1
CAPSULE, DELAYED RELEASE ORAL
Qty: 60 CAPSULE | Refills: 5 | Status: SHIPPED | OUTPATIENT
Start: 2018-01-15 | End: 2019-01-16 | Stop reason: SDUPTHER

## 2018-01-18 ENCOUNTER — OFFICE VISIT (OUTPATIENT)
Dept: FAMILY MEDICINE CLINIC | Facility: CLINIC | Age: 58
End: 2018-01-18

## 2018-01-18 ENCOUNTER — LAB (OUTPATIENT)
Dept: LAB | Facility: HOSPITAL | Age: 58
End: 2018-01-18

## 2018-01-18 ENCOUNTER — TELEPHONE (OUTPATIENT)
Dept: FAMILY MEDICINE CLINIC | Facility: CLINIC | Age: 58
End: 2018-01-18

## 2018-01-18 VITALS
WEIGHT: 178.9 LBS | HEIGHT: 64 IN | DIASTOLIC BLOOD PRESSURE: 78 MMHG | SYSTOLIC BLOOD PRESSURE: 126 MMHG | OXYGEN SATURATION: 98 % | HEART RATE: 71 BPM | BODY MASS INDEX: 30.54 KG/M2

## 2018-01-18 DIAGNOSIS — M54.42 CHRONIC MIDLINE LOW BACK PAIN WITH LEFT-SIDED SCIATICA: ICD-10-CM

## 2018-01-18 DIAGNOSIS — J40 WHEEZY BRONCHITIS: ICD-10-CM

## 2018-01-18 DIAGNOSIS — G89.29 CHRONIC MIDLINE LOW BACK PAIN WITH LEFT-SIDED SCIATICA: ICD-10-CM

## 2018-01-18 DIAGNOSIS — R05.9 COUGH: Primary | ICD-10-CM

## 2018-01-18 LAB
AMPHET+METHAMPHET UR QL: NEGATIVE
BARBITURATES UR QL SCN: NEGATIVE
BENZODIAZ UR QL SCN: NEGATIVE
CANNABINOIDS SERPL QL: NEGATIVE
COCAINE UR QL: NEGATIVE
METHADONE UR QL SCN: NEGATIVE
OPIATES UR QL: POSITIVE
OXYCODONE UR QL SCN: NEGATIVE

## 2018-01-18 PROCEDURE — 80307 DRUG TEST PRSMV CHEM ANLYZR: CPT

## 2018-01-18 PROCEDURE — 99214 OFFICE O/P EST MOD 30 MIN: CPT | Performed by: FAMILY MEDICINE

## 2018-01-18 PROCEDURE — 96372 THER/PROPH/DIAG INJ SC/IM: CPT | Performed by: FAMILY MEDICINE

## 2018-01-18 RX ORDER — TRIAMCINOLONE ACETONIDE 40 MG/ML
80 INJECTION, SUSPENSION INTRA-ARTICULAR; INTRAMUSCULAR ONCE
Status: COMPLETED | OUTPATIENT
Start: 2018-01-18 | End: 2018-01-18

## 2018-01-18 RX ORDER — LEVOFLOXACIN 500 MG/1
500 TABLET, FILM COATED ORAL DAILY
Qty: 7 TABLET | Refills: 0 | Status: SHIPPED | OUTPATIENT
Start: 2018-01-18 | End: 2018-01-25

## 2018-01-18 RX ORDER — HYDROCODONE BITARTRATE AND ACETAMINOPHEN 10; 325 MG/1; MG/1
1 TABLET ORAL EVERY 6 HOURS PRN
Qty: 120 TABLET | Refills: 0 | Status: SHIPPED | OUTPATIENT
Start: 2018-01-18 | End: 2018-02-19 | Stop reason: SDUPTHER

## 2018-01-18 RX ADMIN — TRIAMCINOLONE ACETONIDE 80 MG: 40 INJECTION, SUSPENSION INTRA-ARTICULAR; INTRAMUSCULAR at 15:26

## 2018-01-18 NOTE — TELEPHONE ENCOUNTER
Pr Dr. Trammell, Ms. Valdes has been called with her recent CXR results & recommendations.  Continue her current medications and follow-up as planned or sooner if any problems.      ----- Message from Mikal Trammell MD sent at 1/18/2018  5:00 PM CST -----  Normal by radiology report

## 2018-01-18 NOTE — PROGRESS NOTES
Subjective   Giselle Valdes is a 57 y.o. female.     Arthritis   Pertinent negatives include no fever.   Cough   Pertinent negatives include no fever.   Back Pain   This is a chronic problem. The current episode started more than 1 year ago. The problem is unchanged. The pain is present in the lumbar spine. The quality of the pain is described as aching and shooting. The pain radiates to the left foot. The pain is at a severity of 5/10. The pain is moderate. The pain is worse during the night. The symptoms are aggravated by standing. Stiffness is present in the morning. Pertinent negatives include no bladder incontinence, bowel incontinence or fever.        The following portions of the patient's history were reviewed and updated as appropriate: allergies, current medications, past family history, past medical history, past social history, past surgical history and problem list.    Review of Systems   Constitutional: Negative for fever.   Respiratory: Positive for cough.    Gastrointestinal: Negative for bowel incontinence.   Genitourinary: Negative for bladder incontinence.   Musculoskeletal: Positive for arthralgias, arthritis and back pain.       Objective   Physical Exam   Constitutional: She is oriented to person, place, and time. She appears well-developed and well-nourished. No distress.   HENT:   Head: Normocephalic and atraumatic.   Pulmonary/Chest: Effort normal and breath sounds normal.   Musculoskeletal:        Lumbar back: She exhibits decreased range of motion, tenderness and pain. She exhibits no bony tenderness, no swelling, no edema, no deformity, no laceration and no spasm.        Arms:       Left hand: She exhibits decreased range of motion, tenderness and bony tenderness.   Neurological: She is alert and oriented to person, place, and time.   Reflex Scores:       Patellar reflexes are 2+ on the right side and 2+ on the left side.  Skin: Skin is warm and dry. She is not diaphoretic.    Psychiatric: She has a normal mood and affect. Her behavior is normal. Judgment and thought content normal.   Nursing note and vitals reviewed.      Assessment/Plan   Problems Addressed this Visit        Nervous and Auditory    Chronic midline low back pain with left-sided sciatica (Chronic)    Relevant Orders    Urine Drug Screen - Urine, Clean Catch      Other Visit Diagnoses     Cough    -  Primary    Relevant Orders    XR Chest PA & Lateral (Completed)    Wheezy bronchitis        Relevant Medications    triamcinolone acetonide (KENALOG-40) injection 80 mg (Completed)

## 2018-01-18 NOTE — PROGRESS NOTES
Subjective   Giselle Valdes is a 57 y.o. female.     Arthritis   Associated symptoms include diarrhea.   Cough   Associated symptoms include rhinorrhea, a sore throat and wheezing.   URI    This is a new problem. The current episode started 1 to 4 weeks ago. The problem has been unchanged. There has been no fever. Associated symptoms include congestion, coughing, diarrhea, rhinorrhea, sinus pain, a sore throat and wheezing. Pertinent negatives include no nausea, sneezing or vomiting.   Back Pain   This is a chronic problem. The current episode started more than 1 year ago. The problem is unchanged. The pain is present in the lumbar spine. The quality of the pain is described as aching and shooting. The pain radiates to the left foot. The pain is at a severity of 6/10. The pain is moderate. The pain is worse during the night. The symptoms are aggravated by standing. Stiffness is present in the morning. Pertinent negatives include no bladder incontinence or bowel incontinence.        The following portions of the patient's history were reviewed and updated as appropriate: allergies, current medications, past family history, past medical history, past social history, past surgical history and problem list.    Review of Systems   HENT: Positive for congestion, rhinorrhea, sinus pain and sore throat. Negative for sneezing.    Respiratory: Positive for cough and wheezing.    Gastrointestinal: Positive for diarrhea. Negative for bowel incontinence, nausea and vomiting.   Genitourinary: Negative for bladder incontinence.   Musculoskeletal: Positive for arthritis and back pain.       Objective   Physical Exam    Assessment/Plan   Problems Addressed this Visit        Nervous and Auditory    Chronic midline low back pain with left-sided sciatica (Chronic)    Relevant Orders    Urine Drug Screen - Urine, Clean Catch      Other Visit Diagnoses     Cough    -  Primary    Relevant Orders    XR Chest PA & Lateral (Completed)     Wheezy bronchitis        Relevant Medications    triamcinolone acetonide (KENALOG-40) injection 80 mg (Completed)

## 2018-01-18 NOTE — PROGRESS NOTES
Pr Dr. Trammell, Ms. Valdes has been called with her recent CXR results & recommendations.  Continue her current medications and follow-up as planned or sooner if any problems.    She states you all discussed an inhaler, said that you had samples??    She states she got home and realized she never got it and states she doesn't want to come back up to get it at this time.  Does she still need it??    Please Advise

## 2018-02-12 RX ORDER — AMLODIPINE BESYLATE 10 MG/1
TABLET ORAL
Qty: 30 TABLET | Refills: 3 | Status: SHIPPED | OUTPATIENT
Start: 2018-02-12 | End: 2018-06-18 | Stop reason: SDUPTHER

## 2018-02-19 ENCOUNTER — OFFICE VISIT (OUTPATIENT)
Dept: FAMILY MEDICINE CLINIC | Facility: CLINIC | Age: 58
End: 2018-02-19

## 2018-02-19 VITALS
HEART RATE: 74 BPM | WEIGHT: 179 LBS | OXYGEN SATURATION: 99 % | SYSTOLIC BLOOD PRESSURE: 126 MMHG | HEIGHT: 64 IN | DIASTOLIC BLOOD PRESSURE: 78 MMHG | BODY MASS INDEX: 30.56 KG/M2

## 2018-02-19 DIAGNOSIS — G89.29 CHRONIC MIDLINE LOW BACK PAIN WITH LEFT-SIDED SCIATICA: Primary | ICD-10-CM

## 2018-02-19 DIAGNOSIS — M54.42 CHRONIC MIDLINE LOW BACK PAIN WITH LEFT-SIDED SCIATICA: Primary | ICD-10-CM

## 2018-02-19 PROCEDURE — 99213 OFFICE O/P EST LOW 20 MIN: CPT | Performed by: FAMILY MEDICINE

## 2018-02-19 RX ORDER — HYDROCODONE BITARTRATE AND ACETAMINOPHEN 10; 325 MG/1; MG/1
1 TABLET ORAL EVERY 6 HOURS PRN
Qty: 120 TABLET | Refills: 0 | Status: SHIPPED | OUTPATIENT
Start: 2018-02-19 | End: 2018-03-19 | Stop reason: SDUPTHER

## 2018-02-19 NOTE — PROGRESS NOTES
Subjective   Giselle Valdes is a 57 y.o. female.     Arthritis   Presents for follow-up visit. Affected locations include the neck.   Back Pain   This is a chronic problem. The current episode started more than 1 year ago. The problem is unchanged. The pain is present in the lumbar spine. The quality of the pain is described as aching and shooting. The pain radiates to the left foot. The pain is at a severity of 6/10. The pain is moderate. The pain is worse during the night. The symptoms are aggravated by standing. Stiffness is present in the morning. Pertinent negatives include no bladder incontinence or bowel incontinence.        The following portions of the patient's history were reviewed and updated as appropriate: allergies, current medications, past family history, past medical history, past social history, past surgical history and problem list.    Review of Systems   Gastrointestinal: Negative for bowel incontinence.   Genitourinary: Negative for bladder incontinence.   Musculoskeletal: Positive for arthralgias, arthritis and back pain.       Objective   Physical Exam   Constitutional: She is oriented to person, place, and time. She appears well-developed and well-nourished. No distress.   HENT:   Head: Normocephalic and atraumatic.   Pulmonary/Chest: Effort normal and breath sounds normal.   Musculoskeletal:        Cervical back: She exhibits decreased range of motion and tenderness.        Lumbar back: She exhibits decreased range of motion, tenderness and pain. She exhibits no bony tenderness, no swelling, no edema, no deformity, no laceration and no spasm.   Neurological: She is alert and oriented to person, place, and time.   Reflex Scores:       Patellar reflexes are 2+ on the right side and 2+ on the left side.  Skin: Skin is warm and dry. She is not diaphoretic.   Psychiatric: She has a normal mood and affect. Her behavior is normal. Judgment and thought content normal.   Nursing note and vitals  reviewed.      Assessment/Plan   Problems Addressed this Visit        Nervous and Auditory    Chronic midline low back pain with left-sided sciatica - Primary (Chronic)

## 2018-03-19 ENCOUNTER — OFFICE VISIT (OUTPATIENT)
Dept: FAMILY MEDICINE CLINIC | Facility: CLINIC | Age: 58
End: 2018-03-19

## 2018-03-19 VITALS
HEIGHT: 64 IN | BODY MASS INDEX: 30.54 KG/M2 | SYSTOLIC BLOOD PRESSURE: 130 MMHG | DIASTOLIC BLOOD PRESSURE: 76 MMHG | WEIGHT: 178.9 LBS

## 2018-03-19 DIAGNOSIS — M54.42 CHRONIC MIDLINE LOW BACK PAIN WITH LEFT-SIDED SCIATICA: Primary | ICD-10-CM

## 2018-03-19 DIAGNOSIS — G89.29 CHRONIC MIDLINE LOW BACK PAIN WITH LEFT-SIDED SCIATICA: Primary | ICD-10-CM

## 2018-03-19 PROCEDURE — 99213 OFFICE O/P EST LOW 20 MIN: CPT | Performed by: FAMILY MEDICINE

## 2018-03-19 RX ORDER — HYDROCODONE BITARTRATE AND ACETAMINOPHEN 10; 325 MG/1; MG/1
1 TABLET ORAL EVERY 6 HOURS PRN
Qty: 120 TABLET | Refills: 0 | Status: SHIPPED | OUTPATIENT
Start: 2018-03-19 | End: 2018-04-16 | Stop reason: SDUPTHER

## 2018-03-19 NOTE — PROGRESS NOTES
Subjective   Giselle Valdes is a 57 y.o. female.     Arthritis   Presents for follow-up visit. Affected locations include the neck.   Back Pain   This is a chronic problem. The current episode started more than 1 year ago. The problem is unchanged. The pain is present in the lumbar spine. The quality of the pain is described as aching and shooting. The pain radiates to the left foot. The pain is at a severity of 5/10. The pain is moderate. The pain is worse during the night. The symptoms are aggravated by standing. Stiffness is present in the morning. Pertinent negatives include no bladder incontinence or bowel incontinence.        The following portions of the patient's history were reviewed and updated as appropriate: allergies, current medications, past family history, past medical history, past social history, past surgical history and problem list.    Review of Systems   Gastrointestinal: Negative for bowel incontinence.   Genitourinary: Negative for bladder incontinence.   Musculoskeletal: Positive for arthralgias, arthritis and back pain.       Objective   Physical Exam   Constitutional: She is oriented to person, place, and time. She appears well-developed and well-nourished. No distress.   HENT:   Head: Normocephalic and atraumatic.   Pulmonary/Chest: Effort normal and breath sounds normal.   Musculoskeletal:        Cervical back: She exhibits decreased range of motion and tenderness.        Lumbar back: She exhibits decreased range of motion, tenderness and pain. She exhibits no bony tenderness, no swelling, no edema, no deformity, no laceration and no spasm.   Neurological: She is alert and oriented to person, place, and time.   Reflex Scores:       Bicep reflexes are 2+ on the right side and 2+ on the left side.       Patellar reflexes are 2+ on the right side and 2+ on the left side.  Skin: Skin is warm and dry. She is not diaphoretic.   Psychiatric: She has a normal mood and affect. Her behavior is  normal. Judgment and thought content normal.   Nursing note and vitals reviewed.      Assessment/Plan   Problems Addressed this Visit        Nervous and Auditory    Chronic midline low back pain with left-sided sciatica - Primary (Chronic)      Other Visit Diagnoses    None.

## 2018-04-16 ENCOUNTER — OFFICE VISIT (OUTPATIENT)
Dept: FAMILY MEDICINE CLINIC | Facility: CLINIC | Age: 58
End: 2018-04-16

## 2018-04-16 VITALS
SYSTOLIC BLOOD PRESSURE: 140 MMHG | BODY MASS INDEX: 30.39 KG/M2 | HEIGHT: 64 IN | DIASTOLIC BLOOD PRESSURE: 80 MMHG | WEIGHT: 178 LBS | OXYGEN SATURATION: 97 % | HEART RATE: 91 BPM

## 2018-04-16 DIAGNOSIS — G89.29 CHRONIC MIDLINE LOW BACK PAIN WITH LEFT-SIDED SCIATICA: ICD-10-CM

## 2018-04-16 DIAGNOSIS — M54.42 CHRONIC MIDLINE LOW BACK PAIN WITH LEFT-SIDED SCIATICA: ICD-10-CM

## 2018-04-16 DIAGNOSIS — M06.9 RHEUMATOID ARTHRITIS OF HAND, UNSPECIFIED LATERALITY, UNSPECIFIED RHEUMATOID FACTOR PRESENCE: Primary | ICD-10-CM

## 2018-04-16 PROCEDURE — 99213 OFFICE O/P EST LOW 20 MIN: CPT | Performed by: FAMILY MEDICINE

## 2018-04-16 RX ORDER — HYDROCODONE BITARTRATE AND ACETAMINOPHEN 10; 325 MG/1; MG/1
1 TABLET ORAL EVERY 6 HOURS PRN
Qty: 120 TABLET | Refills: 0 | Status: SHIPPED | OUTPATIENT
Start: 2018-04-16 | End: 2018-05-16 | Stop reason: SDUPTHER

## 2018-04-16 NOTE — PROGRESS NOTES
Subjective   Giselle Valdes is a 57 y.o. female.     Back Pain   This is a chronic problem. The current episode started more than 1 year ago. The problem is unchanged. The pain is present in the lumbar spine. The quality of the pain is described as aching and shooting. The pain radiates to the left foot and right thigh. The pain is at a severity of 6/10. The pain is moderate. The pain is worse during the night. The symptoms are aggravated by standing. Stiffness is present in the morning. Pertinent negatives include no bladder incontinence or bowel incontinence.   Arthritis   Presents for follow-up visit. Affected locations include the neck.        The following portions of the patient's history were reviewed and updated as appropriate: allergies, current medications, past family history, past medical history, past social history, past surgical history and problem list.    Review of Systems   Gastrointestinal: Negative for bowel incontinence.   Genitourinary: Negative for bladder incontinence.   Musculoskeletal: Positive for arthralgias, arthritis and back pain.       Objective   Physical Exam   Constitutional: She is oriented to person, place, and time. She appears well-developed and well-nourished. No distress.   HENT:   Head: Normocephalic and atraumatic.   Pulmonary/Chest: Effort normal and breath sounds normal.   Musculoskeletal:        Cervical back: She exhibits decreased range of motion and tenderness.        Lumbar back: She exhibits decreased range of motion, tenderness and pain. She exhibits no bony tenderness, no swelling, no edema, no deformity, no laceration and no spasm.   Neurological: She is alert and oriented to person, place, and time.   Reflex Scores:       Bicep reflexes are 2+ on the right side and 2+ on the left side.       Patellar reflexes are 2+ on the right side and 2+ on the left side.  Skin: Skin is warm and dry. She is not diaphoretic.   Psychiatric: She has a normal mood and affect.  Her behavior is normal. Judgment and thought content normal.   Nursing note and vitals reviewed.      Assessment/Plan   Problems Addressed this Visit        Nervous and Auditory    Chronic midline low back pain with left-sided sciatica (Chronic)       Musculoskeletal and Integument    Rheumatoid arthritis - Primary (Chronic)      Other Visit Diagnoses    None.

## 2018-05-16 ENCOUNTER — OFFICE VISIT (OUTPATIENT)
Dept: FAMILY MEDICINE CLINIC | Facility: CLINIC | Age: 58
End: 2018-05-16

## 2018-05-16 VITALS
HEART RATE: 70 BPM | HEIGHT: 64 IN | DIASTOLIC BLOOD PRESSURE: 78 MMHG | OXYGEN SATURATION: 98 % | SYSTOLIC BLOOD PRESSURE: 128 MMHG | BODY MASS INDEX: 30.39 KG/M2 | WEIGHT: 178 LBS

## 2018-05-16 DIAGNOSIS — K21.9 GASTROESOPHAGEAL REFLUX DISEASE WITHOUT ESOPHAGITIS: ICD-10-CM

## 2018-05-16 DIAGNOSIS — M06.9 RHEUMATOID ARTHRITIS OF HAND, UNSPECIFIED LATERALITY, UNSPECIFIED RHEUMATOID FACTOR PRESENCE: ICD-10-CM

## 2018-05-16 DIAGNOSIS — G89.29 CHRONIC MIDLINE LOW BACK PAIN WITH LEFT-SIDED SCIATICA: ICD-10-CM

## 2018-05-16 DIAGNOSIS — I10 ESSENTIAL HYPERTENSION: Primary | ICD-10-CM

## 2018-05-16 DIAGNOSIS — M54.42 CHRONIC MIDLINE LOW BACK PAIN WITH LEFT-SIDED SCIATICA: ICD-10-CM

## 2018-05-16 PROCEDURE — 99214 OFFICE O/P EST MOD 30 MIN: CPT | Performed by: FAMILY MEDICINE

## 2018-05-16 RX ORDER — HYDROCODONE BITARTRATE AND ACETAMINOPHEN 10; 325 MG/1; MG/1
1 TABLET ORAL EVERY 6 HOURS PRN
Qty: 120 TABLET | Refills: 0 | Status: SHIPPED | OUTPATIENT
Start: 2018-05-16 | End: 2018-06-15 | Stop reason: SDUPTHER

## 2018-05-16 NOTE — PROGRESS NOTES
Subjective   Giselle Valdes is a 57 y.o. female.     Back Pain   This is a chronic problem. The current episode started more than 1 year ago. The problem is unchanged. The pain is present in the lumbar spine. The quality of the pain is described as aching and shooting. The pain radiates to the left foot and right thigh. The pain is at a severity of 5/10. The pain is moderate. The pain is worse during the night. The symptoms are aggravated by standing. Stiffness is present in the morning. Pertinent negatives include no bladder incontinence, bowel incontinence, chest pain or fever.   Arthritis   Presents for follow-up visit. Affected locations include the neck. Pertinent negatives include no fever.   Hypertension   This is a chronic problem. The current episode started more than 1 year ago. The problem is unchanged. The problem is controlled. Pertinent negatives include no chest pain, orthopnea, palpitations, peripheral edema, PND or shortness of breath.   Heartburn   She reports no chest pain or no heartburn.   Depression   Visit Type: follow-up  Patient presents with the following symptoms: depressed mood.  Patient is not experiencing: insomnia, irritability, nervousness/anxiety, palpitations and shortness of breath.         The following portions of the patient's history were reviewed and updated as appropriate: allergies, current medications, past family history, past medical history, past social history, past surgical history and problem list.    Review of Systems   Constitutional: Negative for fever and irritability.   Respiratory: Negative for shortness of breath.    Cardiovascular: Negative for chest pain, palpitations, orthopnea and PND.   Gastrointestinal: Negative for bowel incontinence and heartburn.   Genitourinary: Negative for bladder incontinence.   Musculoskeletal: Positive for arthralgias, arthritis and back pain.   Psychiatric/Behavioral: The patient is not nervous/anxious and does not have  insomnia.        Objective   Physical Exam   Constitutional: She is oriented to person, place, and time. She appears well-developed and well-nourished. No distress.   HENT:   Head: Normocephalic and atraumatic.   Cardiovascular: Normal rate, regular rhythm, normal heart sounds and intact distal pulses.  Exam reveals no gallop and no friction rub.    No murmur heard.  Pulmonary/Chest: Effort normal and breath sounds normal. No respiratory distress. She has no wheezes. She has no rales. She exhibits no tenderness.   Musculoskeletal:        Lumbar back: She exhibits decreased range of motion, tenderness and pain. She exhibits no bony tenderness, no swelling, no edema, no deformity, no laceration and no spasm.   Neurological: She is alert and oriented to person, place, and time.   Reflex Scores:       Bicep reflexes are 2+ on the right side and 2+ on the left side.       Patellar reflexes are 2+ on the right side and 2+ on the left side.  Skin: Skin is warm and dry. She is not diaphoretic.   Psychiatric: She has a normal mood and affect. Her behavior is normal. Judgment and thought content normal.   Nursing note and vitals reviewed.      Assessment/Plan   Problems Addressed this Visit        Cardiovascular and Mediastinum    Essential hypertension - Primary (Chronic)       Digestive    Gastroesophageal reflux disease (Chronic)       Nervous and Auditory    Chronic midline low back pain with left-sided sciatica (Chronic)       Musculoskeletal and Integument    Rheumatoid arthritis (Chronic)

## 2018-06-15 ENCOUNTER — OFFICE VISIT (OUTPATIENT)
Dept: FAMILY MEDICINE CLINIC | Facility: CLINIC | Age: 58
End: 2018-06-15

## 2018-06-15 VITALS
BODY MASS INDEX: 30.05 KG/M2 | OXYGEN SATURATION: 98 % | HEIGHT: 64 IN | WEIGHT: 176 LBS | DIASTOLIC BLOOD PRESSURE: 70 MMHG | SYSTOLIC BLOOD PRESSURE: 170 MMHG | HEART RATE: 89 BPM

## 2018-06-15 DIAGNOSIS — M54.42 CHRONIC MIDLINE LOW BACK PAIN WITH LEFT-SIDED SCIATICA: Primary | ICD-10-CM

## 2018-06-15 DIAGNOSIS — G89.29 CHRONIC MIDLINE LOW BACK PAIN WITH LEFT-SIDED SCIATICA: Primary | ICD-10-CM

## 2018-06-15 PROCEDURE — 99213 OFFICE O/P EST LOW 20 MIN: CPT | Performed by: FAMILY MEDICINE

## 2018-06-15 RX ORDER — DULOXETIN HYDROCHLORIDE 60 MG/1
120 CAPSULE, DELAYED RELEASE ORAL DAILY
Qty: 60 CAPSULE | Refills: 11 | Status: SHIPPED | OUTPATIENT
Start: 2018-06-15 | End: 2020-03-16 | Stop reason: SDUPTHER

## 2018-06-15 RX ORDER — MUPIROCIN CALCIUM 20 MG/G
CREAM TOPICAL 3 TIMES DAILY
Qty: 15 G | Refills: 0 | Status: SHIPPED | OUTPATIENT
Start: 2018-06-15 | End: 2019-07-04

## 2018-06-15 RX ORDER — HYDROCODONE BITARTRATE AND ACETAMINOPHEN 10; 325 MG/1; MG/1
1 TABLET ORAL EVERY 6 HOURS PRN
Qty: 120 TABLET | Refills: 0 | Status: SHIPPED | OUTPATIENT
Start: 2018-06-15 | End: 2018-07-13 | Stop reason: SDUPTHER

## 2018-06-15 NOTE — PROGRESS NOTES
Subjective   Giselle Valdes is a 57 y.o. female.     Back Pain   This is a chronic problem. The current episode started more than 1 year ago. The problem is unchanged. The pain is present in the lumbar spine. The quality of the pain is described as aching and shooting. The pain radiates to the left foot and right thigh. The pain is at a severity of 6/10. The pain is moderate. The pain is worse during the night. The symptoms are aggravated by standing. Stiffness is present in the morning. Pertinent negatives include no bladder incontinence or bowel incontinence.   Arthritis   Presents for follow-up visit. Affected locations include the neck.        The following portions of the patient's history were reviewed and updated as appropriate: allergies, current medications, past family history, past medical history, past social history, past surgical history and problem list.    Review of Systems   Gastrointestinal: Negative for bowel incontinence.   Genitourinary: Negative for bladder incontinence.   Musculoskeletal: Positive for arthralgias, arthritis and back pain.       Objective   Physical Exam   Constitutional: She is oriented to person, place, and time. She appears well-developed and well-nourished. No distress.   HENT:   Head: Normocephalic and atraumatic.   Pulmonary/Chest: Effort normal and breath sounds normal.   Musculoskeletal:        Lumbar back: She exhibits decreased range of motion, tenderness and pain. She exhibits no bony tenderness, no swelling, no edema, no deformity, no laceration and no spasm.   Neurological: She is alert and oriented to person, place, and time.   Reflex Scores:       Bicep reflexes are 2+ on the right side and 2+ on the left side.       Patellar reflexes are 2+ on the right side and 2+ on the left side.  Skin: Skin is warm and dry. She is not diaphoretic.   Psychiatric: She has a normal mood and affect. Her behavior is normal. Judgment and thought content normal.   Nursing note  and vitals reviewed.      Assessment/Plan   Problems Addressed this Visit        Nervous and Auditory    Chronic midline low back pain with left-sided sciatica - Primary (Chronic)

## 2018-06-18 RX ORDER — AMLODIPINE BESYLATE 10 MG/1
TABLET ORAL
Qty: 30 TABLET | Refills: 3 | Status: SHIPPED | OUTPATIENT
Start: 2018-06-18 | End: 2018-10-19 | Stop reason: SDUPTHER

## 2018-07-13 ENCOUNTER — OFFICE VISIT (OUTPATIENT)
Dept: FAMILY MEDICINE CLINIC | Facility: CLINIC | Age: 58
End: 2018-07-13

## 2018-07-13 VITALS
WEIGHT: 176.7 LBS | HEART RATE: 87 BPM | BODY MASS INDEX: 30.17 KG/M2 | DIASTOLIC BLOOD PRESSURE: 80 MMHG | HEIGHT: 64 IN | OXYGEN SATURATION: 95 % | SYSTOLIC BLOOD PRESSURE: 144 MMHG

## 2018-07-13 DIAGNOSIS — M54.42 CHRONIC MIDLINE LOW BACK PAIN WITH LEFT-SIDED SCIATICA: Primary | ICD-10-CM

## 2018-07-13 DIAGNOSIS — M06.9 RHEUMATOID ARTHRITIS OF HAND, UNSPECIFIED LATERALITY, UNSPECIFIED RHEUMATOID FACTOR PRESENCE: ICD-10-CM

## 2018-07-13 DIAGNOSIS — G89.29 CHRONIC MIDLINE LOW BACK PAIN WITH LEFT-SIDED SCIATICA: Primary | ICD-10-CM

## 2018-07-13 PROCEDURE — 99213 OFFICE O/P EST LOW 20 MIN: CPT | Performed by: FAMILY MEDICINE

## 2018-07-13 RX ORDER — HYDROCODONE BITARTRATE AND ACETAMINOPHEN 10; 325 MG/1; MG/1
1 TABLET ORAL EVERY 6 HOURS PRN
Qty: 120 TABLET | Refills: 0 | Status: SHIPPED | OUTPATIENT
Start: 2018-07-13 | End: 2018-08-13 | Stop reason: SDUPTHER

## 2018-07-13 NOTE — PATIENT INSTRUCTIONS
MyPlate from [employer]  The general, healthful diet is based on the 2010 Dietary Guidelines for Americans. The amount of food you need to eat from each food group depends on your age, sex, and level of physical activity and can be individualized by a dietitian. Go to ChooseMyPlate.gov for more information.  What do I need to know about the MyPlate plan?  · Enjoy your food, but eat less.  · Avoid oversized portions.  ? ½ of your plate should include fruits and vegetables.  ? ¼ of your plate should be grains.  ? ¼ of your plate should be protein.  Grains  · Make at least half of your grains whole grains.  · For a 2,000 calorie daily food plan, eat 6 oz every day.  · 1 oz is about 1 slice bread, 1 cup cereal, or ½ cup cooked rice, cereal, or pasta.  Vegetables  · Make half your plate fruits and vegetables.  · For a 2,000 calorie daily food plan, eat 2½ cups every day.  · 1 cup is about 1 cup raw or cooked vegetables or vegetable juice or 2 cups raw leafy greens.  Fruits  · Make half your plate fruits and vegetables.  · For a 2,000 calorie daily food plan, eat 2 cups every day.  · 1 cup is about 1 cup fruit or 100% fruit juice or ½ cup dried fruit.  Protein  · For a 2,000 calorie daily food plan, eat 5½ oz every day.  · 1 oz is about 1 oz meat, poultry, or fish, ¼ cup cooked beans, 1 egg, 1 Tbsp peanut butter, or ½ oz nuts or seeds.  Dairy  · Switch to fat-free or low-fat (1%) milk.  · For a 2,000 calorie daily food plan, eat 3 cups every day.  · 1 cup is about 1 cup milk or yogurt or soy milk (soy beverage), 1½ oz natural cheese, or 2 oz processed cheese.  Fats, Oils, and Empty Calories  · Only small amounts of oils are recommended.  · Empty calories are calories from solid fats or added sugars.  · Compare sodium in foods like soup, bread, and frozen meals. Choose the foods with lower numbers.  · Drink water instead of sugary drinks.  What foods can I eat?  Grains  Whole grains such as whole wheat, quinoa, millet, and  bulgur. Bread, rolls, and pasta made from whole grains. Brown or wild rice. Hot or cold cereals made from whole grains and without added sugar.  Vegetables  All fresh vegetables, especially fresh red, dark green, or orange vegetables. Peas and beans. Low-sodium frozen or canned vegetables prepared without added salt. Low-sodium vegetable juices.  Fruits  All fresh, frozen, and dried fruits. Canned fruit packed in water or fruit juice without added sugar. Fruit juices without added sugar.  Meats and Other Protein Sources  Boiled, baked, or grilled lean meat trimmed of fat. Skinless poultry. Fresh seafood and shellfish. Canned seafood packed in water. Unsalted nuts and unsalted nut butters. Tofu. Dried beans and pea. Eggs.  Dairy  Low-fat or fat-free milk, yogurt, and cheeses.  Sweets and Desserts  Frozen desserts made from low-fat milk.  Fats and Oils  Olive, peanut, and canola oils and margarine. Salad dressing and mayonnaise made from these oils.  Other  Soups and casseroles made from allowed ingredients and without added fat or salt.  The items listed above may not be a complete list of recommended foods or beverages. Contact your dietitian for more options.  What foods are not recommended?  Grains  Sweetened, low-fiber cereals. Packaged baked goods. Snack crackers and chips. Cheese crackers, butter crackers, and biscuits. Frozen waffles, sweet breads, doughnuts, pastries, packaged baking mixes, pancakes, cakes, and cookies.  Vegetables  Regular canned or frozen vegetables or vegetables prepared with salt. Canned tomatoes. Canned tomato sauce. Fried vegetables. Vegetables in cream sauce or cheese sauce.  Fruits  Fruits packed in syrup or made with added sugar.  Meats and Other Protein Sources  Marbled or fatty meats such as ribs. Poultry with skin. Fried meats, poultry, eggs, or fish. Sausages, hot dogs, and deli meats such as pastrami, bologna, or salami.  Dairy  Whole milk, cream, cheeses made from whole milk,  sour cream. Ice cream or yogurt made from whole milk or with added sugar.  Beverages  For adults, no more than one alcoholic drink per day. Regular soft drinks or other sugary beverages. Juice drinks.  Sweets and Desserts  Sugary or fatty desserts, candy, and other sweets.  Fats and Oils  Solid shortening or partially hydrogenated oils. Solid margarine. Margarine that contains trans fats. Butter.  The items listed above may not be a complete list of foods and beverages to avoid. Contact your dietitian for more information.  This information is not intended to replace advice given to you by your health care provider. Make sure you discuss any questions you have with your health care provider.  Document Released: 01/06/2009 Document Revised: 05/25/2017 Document Reviewed: 11/26/2014  [employer] Interactive Patient Education © 2018 [employer]  BMI for Adults  Body mass index (BMI) is a number that is calculated from a person's weight and height. In most adults, the number is used to find how much of an adult's weight is made up of fat. BMI is not as accurate as a direct measure of body fat.  How is BMI calculated?  BMI is calculated by dividing weight in kilograms by height in meters squared. It can also be calculated by dividing weight in pounds by height in inches squared, then multiplying the resulting number by 703. Charts are available to help you find your BMI quickly and easily without doing this calculation.  How is BMI interpreted?  Health care professionals use BMI charts to identify whether an adult is underweight, at a normal weight, or overweight based on the following guidelines:  · Underweight: BMI less than 18.5.  · Normal weight: BMI between 18.5 and 24.9.  · Overweight: BMI between 25 and 29.9.  · Obese: BMI of 30 and above.    BMI is usually interpreted the same for males and females.  Weight includes both fat and muscle, so someone with a muscular build, such as an athlete, may have a BMI that is  higher than 24.9. In cases like these, BMI may not accurately depict body fat. To determine if excess body fat is the cause of a BMI of 25 or higher, further assessments may need to be done by a health care provider.  Why is BMI a useful tool?  BMI is used to identify a possible weight problem that may be related to a medical problem or may increase the risk for medical problems. BMI can also be used to promote changes to reach a healthy weight.  This information is not intended to replace advice given to you by your health care provider. Make sure you discuss any questions you have with your health care provider.  Document Released: 08/29/2005 Document Revised: 04/27/2017 Document Reviewed: 05/15/2015  [employer] Interactive Patient Education © 2018 [employer]

## 2018-07-13 NOTE — PROGRESS NOTES
Subjective   Giselle Valdes is a 57 y.o. female.     Back Pain   This is a chronic problem. The current episode started more than 1 year ago. The problem is unchanged. The pain is present in the lumbar spine. The quality of the pain is described as aching and shooting. The pain radiates to the left foot and right thigh. The pain is at a severity of 6/10. The pain is moderate. The pain is worse during the night. The symptoms are aggravated by standing. Stiffness is present in the morning. Pertinent negatives include no bladder incontinence, bowel incontinence or fever.   Hypertension     Arthritis   Presents for follow-up visit. Affected locations include the neck. Pertinent negatives include no fever.        The following portions of the patient's history were reviewed and updated as appropriate: allergies, current medications, past family history, past medical history, past social history, past surgical history and problem list.    Review of Systems   Constitutional: Negative for fever.   Gastrointestinal: Negative for bowel incontinence.   Genitourinary: Negative for bladder incontinence.   Musculoskeletal: Positive for arthralgias, arthritis and back pain.       Objective   Physical Exam   Constitutional: She is oriented to person, place, and time. She appears well-developed and well-nourished. No distress.   HENT:   Head: Normocephalic and atraumatic.   Pulmonary/Chest: Effort normal and breath sounds normal.   Musculoskeletal:        Lumbar back: She exhibits decreased range of motion, tenderness and pain. She exhibits no bony tenderness, no swelling, no edema, no deformity, no laceration and no spasm.   Neurological: She is alert and oriented to person, place, and time.   Reflex Scores:       Bicep reflexes are 2+ on the right side and 2+ on the left side.       Patellar reflexes are 2+ on the right side and 2+ on the left side.  Skin: Skin is warm and dry. She is not diaphoretic.   Psychiatric: She has a  normal mood and affect. Her behavior is normal. Judgment and thought content normal.   Nursing note and vitals reviewed.      Assessment/Plan   Problems Addressed this Visit        Nervous and Auditory    Chronic midline low back pain with left-sided sciatica - Primary (Chronic)      Other Visit Diagnoses     Rheumatoid arthritis of hand, unspecified laterality, unspecified rheumatoid factor presence (CMS/[facility])              ME= 40

## 2018-08-13 ENCOUNTER — TRANSCRIBE ORDERS (OUTPATIENT)
Dept: LAB | Facility: HOSPITAL | Age: 58
End: 2018-08-13

## 2018-08-13 ENCOUNTER — LAB (OUTPATIENT)
Dept: LAB | Facility: HOSPITAL | Age: 58
End: 2018-08-13

## 2018-08-13 ENCOUNTER — OFFICE VISIT (OUTPATIENT)
Dept: FAMILY MEDICINE CLINIC | Facility: CLINIC | Age: 58
End: 2018-08-13

## 2018-08-13 VITALS
HEIGHT: 64 IN | HEART RATE: 83 BPM | WEIGHT: 173 LBS | SYSTOLIC BLOOD PRESSURE: 136 MMHG | OXYGEN SATURATION: 98 % | BODY MASS INDEX: 29.53 KG/M2 | DIASTOLIC BLOOD PRESSURE: 80 MMHG

## 2018-08-13 DIAGNOSIS — M06.9 RHEUMATOID ARTHRITIS OF HAND, UNSPECIFIED LATERALITY, UNSPECIFIED RHEUMATOID FACTOR PRESENCE: Primary | ICD-10-CM

## 2018-08-13 DIAGNOSIS — M05.79 SEROPOSITIVE RHEUMATOID ARTHRITIS OF MULTIPLE SITES (HCC): Primary | ICD-10-CM

## 2018-08-13 DIAGNOSIS — Z79.899 ENCOUNTER FOR LONG-TERM (CURRENT) USE OF MEDICATIONS: ICD-10-CM

## 2018-08-13 DIAGNOSIS — G89.29 CHRONIC MIDLINE LOW BACK PAIN WITH LEFT-SIDED SCIATICA: ICD-10-CM

## 2018-08-13 DIAGNOSIS — J40 BRONCHITIS: ICD-10-CM

## 2018-08-13 DIAGNOSIS — D84.9 IMMUNOCOMPROMISED (HCC): ICD-10-CM

## 2018-08-13 DIAGNOSIS — M54.42 CHRONIC MIDLINE LOW BACK PAIN WITH LEFT-SIDED SCIATICA: ICD-10-CM

## 2018-08-13 DIAGNOSIS — M05.79 SEROPOSITIVE RHEUMATOID ARTHRITIS OF MULTIPLE SITES (HCC): ICD-10-CM

## 2018-08-13 LAB
AST SERPL-CCNC: 50 U/L (ref 14–36)
BASOPHILS # BLD AUTO: 0.03 10*3/MM3 (ref 0–0.2)
BASOPHILS NFR BLD AUTO: 0.6 % (ref 0–2)
CREAT BLD-MCNC: 0.89 MG/DL (ref 0.5–1)
DEPRECATED RDW RBC AUTO: 49 FL (ref 36.4–46.3)
EOSINOPHIL # BLD AUTO: 0.16 10*3/MM3 (ref 0–0.7)
EOSINOPHIL NFR BLD AUTO: 3.2 % (ref 0–7)
ERYTHROCYTE [DISTWIDTH] IN BLOOD BY AUTOMATED COUNT: 14.9 % (ref 11.5–14.5)
GFR SERPL CREATININE-BSD FRML MDRD: 65 ML/MIN/1.73 (ref 45–104)
HCT VFR BLD AUTO: 40 % (ref 35–45)
HGB BLD-MCNC: 13.1 G/DL (ref 12–15.5)
IMM GRANULOCYTES # BLD: 0.02 10*3/MM3 (ref 0–0.02)
IMM GRANULOCYTES NFR BLD: 0.4 % (ref 0–0.5)
LYMPHOCYTES # BLD AUTO: 2.36 10*3/MM3 (ref 0.6–4.2)
LYMPHOCYTES NFR BLD AUTO: 47.6 % (ref 10–50)
MCH RBC QN AUTO: 29.3 PG (ref 26.5–34)
MCHC RBC AUTO-ENTMCNC: 32.8 G/DL (ref 31.4–36)
MCV RBC AUTO: 89.5 FL (ref 80–98)
MONOCYTES # BLD AUTO: 0.76 10*3/MM3 (ref 0–0.9)
MONOCYTES NFR BLD AUTO: 15.3 % (ref 0–12)
NEUTROPHILS # BLD AUTO: 1.63 10*3/MM3 (ref 2–8.6)
NEUTROPHILS NFR BLD AUTO: 32.9 % (ref 37–80)
PLATELET # BLD AUTO: 316 10*3/MM3 (ref 150–450)
PMV BLD AUTO: 8.7 FL (ref 8–12)
RBC # BLD AUTO: 4.47 10*6/MM3 (ref 3.77–5.16)
WBC NRBC COR # BLD: 4.96 10*3/MM3 (ref 3.2–9.8)

## 2018-08-13 PROCEDURE — 36415 COLL VENOUS BLD VENIPUNCTURE: CPT

## 2018-08-13 PROCEDURE — 84450 TRANSFERASE (AST) (SGOT): CPT

## 2018-08-13 PROCEDURE — 96372 THER/PROPH/DIAG INJ SC/IM: CPT | Performed by: FAMILY MEDICINE

## 2018-08-13 PROCEDURE — 99214 OFFICE O/P EST MOD 30 MIN: CPT | Performed by: FAMILY MEDICINE

## 2018-08-13 PROCEDURE — 85025 COMPLETE CBC W/AUTO DIFF WBC: CPT | Performed by: INTERNAL MEDICINE

## 2018-08-13 PROCEDURE — 82565 ASSAY OF CREATININE: CPT

## 2018-08-13 RX ORDER — ALBUTEROL SULFATE 90 UG/1
2 AEROSOL, METERED RESPIRATORY (INHALATION)
Qty: 8 G | Refills: 1 | Status: SHIPPED | OUTPATIENT
Start: 2018-08-13 | End: 2018-12-12 | Stop reason: SDUPTHER

## 2018-08-13 RX ORDER — HYDROCODONE BITARTRATE AND ACETAMINOPHEN 10; 325 MG/1; MG/1
1 TABLET ORAL EVERY 6 HOURS PRN
Qty: 120 TABLET | Refills: 0 | Status: SHIPPED | OUTPATIENT
Start: 2018-08-13 | End: 2018-09-10 | Stop reason: SDUPTHER

## 2018-08-13 RX ORDER — SULFAMETHOXAZOLE AND TRIMETHOPRIM 800; 160 MG/1; MG/1
1 TABLET ORAL 2 TIMES DAILY
Qty: 14 TABLET | Refills: 0 | Status: SHIPPED | OUTPATIENT
Start: 2018-08-13 | End: 2018-08-20

## 2018-08-13 RX ORDER — TRIAMCINOLONE ACETONIDE 40 MG/ML
80 INJECTION, SUSPENSION INTRA-ARTICULAR; INTRAMUSCULAR ONCE
Status: COMPLETED | OUTPATIENT
Start: 2018-08-13 | End: 2018-08-13

## 2018-08-13 RX ORDER — TRIAMCINOLONE ACETONIDE 40 MG/ML
40 INJECTION, SUSPENSION INTRA-ARTICULAR; INTRAMUSCULAR ONCE
Status: DISCONTINUED | OUTPATIENT
Start: 2018-08-13 | End: 2018-08-13

## 2018-08-13 RX ADMIN — TRIAMCINOLONE ACETONIDE 80 MG: 40 INJECTION, SUSPENSION INTRA-ARTICULAR; INTRAMUSCULAR at 13:39

## 2018-08-13 NOTE — PROGRESS NOTES
Subjective   Giselle Valdes is a 58 y.o. female.     Back Pain   This is a chronic problem. The current episode started more than 1 year ago. The problem is unchanged. The pain is present in the lumbar spine. The quality of the pain is described as aching and shooting. The pain radiates to the left foot and right thigh. The pain is at a severity of 8/10. The pain is moderate. The pain is worse during the night. The symptoms are aggravated by standing. Stiffness is present in the morning. Pertinent negatives include no bladder incontinence, bowel incontinence or fever.   Arthritis   Presents for follow-up visit. Affected locations include the neck, right PIP and left PIP. Associated symptoms include fatigue and pain while resting. Pertinent negatives include no fever.   URI    This is a new problem. The current episode started in the past 7 days. The problem has been unchanged. Associated symptoms include congestion, coughing, rhinorrhea and sneezing. Pertinent negatives include no sinus pain, sore throat or wheezing. She has tried inhaler use for the symptoms. The treatment provided no relief.        The following portions of the patient's history were reviewed and updated as appropriate: allergies, current medications, past family history, past medical history, past social history, past surgical history and problem list.    Review of Systems   Constitutional: Positive for fatigue. Negative for fever.   HENT: Positive for congestion, rhinorrhea and sneezing. Negative for sinus pain and sore throat.    Respiratory: Positive for cough. Negative for wheezing.    Gastrointestinal: Negative for bowel incontinence.   Genitourinary: Negative for bladder incontinence.   Musculoskeletal: Positive for arthralgias, arthritis and back pain.       Objective   Physical Exam   Constitutional: She is oriented to person, place, and time. She appears well-developed and well-nourished. No distress.   HENT:   Head: Normocephalic  and atraumatic.   Cardiovascular: Normal rate, regular rhythm and normal heart sounds.  Exam reveals no gallop and no friction rub.    No murmur heard.  Pulmonary/Chest: Effort normal. She has decreased breath sounds. She has no wheezes. She has rhonchi. She has no rales.   Musculoskeletal:        Right wrist: She exhibits tenderness. She exhibits normal range of motion and no swelling.        Left wrist: She exhibits tenderness. She exhibits normal range of motion and no bony tenderness.        Lumbar back: She exhibits decreased range of motion, tenderness and pain. She exhibits no bony tenderness, no swelling, no edema, no deformity, no laceration and no spasm.   Neurological: She is alert and oriented to person, place, and time.   Reflex Scores:       Bicep reflexes are 2+ on the right side and 2+ on the left side.       Patellar reflexes are 2+ on the right side and 2+ on the left side.  Skin: Skin is warm and dry. She is not diaphoretic.   Psychiatric: She has a normal mood and affect. Her behavior is normal. Judgment and thought content normal.   Nursing note and vitals reviewed.      Assessment/Plan   Problems Addressed this Visit        Nervous and Auditory    Chronic midline low back pain with left-sided sciatica (Chronic)    Relevant Medications    triamcinolone acetonide (KENALOG-40) injection 80 mg (Completed)       Musculoskeletal and Integument    Rheumatoid arthritis (CMS/HCC) - Primary (Chronic)    Relevant Medications    triamcinolone acetonide (KENALOG-40) injection 80 mg (Completed)       Other    Immunocompromised (CMS/HCC)      Other Visit Diagnoses     Bronchitis        Relevant Medications    albuterol (PROVENTIL HFA;VENTOLIN HFA) 108 (90 Base) MCG/ACT inhaler        Flair of RA    ME=40

## 2018-09-10 ENCOUNTER — OFFICE VISIT (OUTPATIENT)
Dept: FAMILY MEDICINE CLINIC | Facility: CLINIC | Age: 58
End: 2018-09-10

## 2018-09-10 VITALS
HEIGHT: 64 IN | OXYGEN SATURATION: 98 % | BODY MASS INDEX: 29.65 KG/M2 | HEART RATE: 75 BPM | DIASTOLIC BLOOD PRESSURE: 72 MMHG | WEIGHT: 173.7 LBS | SYSTOLIC BLOOD PRESSURE: 126 MMHG

## 2018-09-10 DIAGNOSIS — M06.9 RHEUMATOID ARTHRITIS OF HAND, UNSPECIFIED LATERALITY, UNSPECIFIED RHEUMATOID FACTOR PRESENCE: Primary | ICD-10-CM

## 2018-09-10 DIAGNOSIS — G89.29 CHRONIC MIDLINE LOW BACK PAIN WITH LEFT-SIDED SCIATICA: ICD-10-CM

## 2018-09-10 DIAGNOSIS — M54.2 NECK PAIN: ICD-10-CM

## 2018-09-10 DIAGNOSIS — M54.42 CHRONIC MIDLINE LOW BACK PAIN WITH LEFT-SIDED SCIATICA: ICD-10-CM

## 2018-09-10 PROCEDURE — 99213 OFFICE O/P EST LOW 20 MIN: CPT | Performed by: FAMILY MEDICINE

## 2018-09-10 RX ORDER — HYDROCODONE BITARTRATE AND ACETAMINOPHEN 10; 325 MG/1; MG/1
1 TABLET ORAL EVERY 6 HOURS PRN
Qty: 120 TABLET | Refills: 0 | Status: SHIPPED | OUTPATIENT
Start: 2018-09-10 | End: 2018-10-15 | Stop reason: SDUPTHER

## 2018-09-10 NOTE — PROGRESS NOTES
Subjective   Giselle Valdes is a 58 y.o. female.     Back Pain   This is a chronic problem. The current episode started more than 1 year ago. The problem is unchanged. The pain is present in the lumbar spine. The quality of the pain is described as aching and shooting. The pain radiates to the left foot and right thigh. The pain is at a severity of 6/10. The pain is moderate. The pain is worse during the night. The symptoms are aggravated by standing. Stiffness is present in the morning. Pertinent negatives include no bladder incontinence, bowel incontinence or fever.   Arthritis   Presents for follow-up visit. Affected locations include the neck. Pertinent negatives include no fever.        The following portions of the patient's history were reviewed and updated as appropriate: allergies, current medications, past family history, past medical history, past social history, past surgical history and problem list.    Review of Systems   Constitutional: Negative for fever.   Gastrointestinal: Negative for bowel incontinence.   Genitourinary: Negative for bladder incontinence.   Musculoskeletal: Positive for arthralgias, arthritis and back pain.       Objective   Physical Exam   Constitutional: She is oriented to person, place, and time. She appears well-developed and well-nourished. No distress.   HENT:   Head: Normocephalic and atraumatic.   Pulmonary/Chest: Effort normal and breath sounds normal.   Musculoskeletal:        Cervical back: She exhibits decreased range of motion, tenderness, pain and spasm. She exhibits no bony tenderness, no swelling, no edema, no deformity and no laceration.        Lumbar back: She exhibits decreased range of motion, tenderness and pain. She exhibits no bony tenderness, no swelling, no edema, no deformity, no laceration and no spasm.   Neurological: She is alert and oriented to person, place, and time.   Reflex Scores:       Bicep reflexes are 2+ on the right side and 2+ on the  left side.       Patellar reflexes are 2+ on the right side and 2+ on the left side.  Skin: Skin is warm and dry. She is not diaphoretic.   Psychiatric: She has a normal mood and affect. Her behavior is normal. Judgment and thought content normal.   Nursing note and vitals reviewed.      Assessment/Plan   Problems Addressed this Visit        Nervous and Auditory    Chronic midline low back pain with left-sided sciatica (Chronic)       Musculoskeletal and Integument    Rheumatoid arthritis (CMS/HCC) - Primary (Chronic)      Other Visit Diagnoses     Neck pain

## 2018-10-15 ENCOUNTER — OFFICE VISIT (OUTPATIENT)
Dept: FAMILY MEDICINE CLINIC | Facility: CLINIC | Age: 58
End: 2018-10-15

## 2018-10-15 VITALS
HEIGHT: 64 IN | DIASTOLIC BLOOD PRESSURE: 74 MMHG | OXYGEN SATURATION: 96 % | SYSTOLIC BLOOD PRESSURE: 122 MMHG | WEIGHT: 176.5 LBS | BODY MASS INDEX: 30.13 KG/M2 | HEART RATE: 82 BPM

## 2018-10-15 DIAGNOSIS — Z23 FLU VACCINE NEED: ICD-10-CM

## 2018-10-15 DIAGNOSIS — M54.42 CHRONIC MIDLINE LOW BACK PAIN WITH LEFT-SIDED SCIATICA: Primary | ICD-10-CM

## 2018-10-15 DIAGNOSIS — G89.29 CHRONIC MIDLINE LOW BACK PAIN WITH LEFT-SIDED SCIATICA: Primary | ICD-10-CM

## 2018-10-15 PROCEDURE — 90471 IMMUNIZATION ADMIN: CPT | Performed by: FAMILY MEDICINE

## 2018-10-15 PROCEDURE — 99213 OFFICE O/P EST LOW 20 MIN: CPT | Performed by: FAMILY MEDICINE

## 2018-10-15 PROCEDURE — 90674 CCIIV4 VAC NO PRSV 0.5 ML IM: CPT | Performed by: FAMILY MEDICINE

## 2018-10-15 RX ORDER — HYDROCODONE BITARTRATE AND ACETAMINOPHEN 10; 325 MG/1; MG/1
1 TABLET ORAL EVERY 6 HOURS PRN
Qty: 120 TABLET | Refills: 0 | Status: SHIPPED | OUTPATIENT
Start: 2018-10-15 | End: 2018-11-12 | Stop reason: SDUPTHER

## 2018-10-15 NOTE — PROGRESS NOTES
Subjective   Giselle Valdes is a 58 y.o. female.     Arthritis   Presents for follow-up visit. Affected locations include the neck. Pertinent negatives include no fever.   Back Pain   This is a chronic problem. The current episode started more than 1 year ago. The problem is unchanged. The pain is present in the lumbar spine. The quality of the pain is described as aching and shooting. The pain radiates to the left foot and right thigh. The pain is at a severity of 6/10. The pain is moderate. The pain is worse during the night. The symptoms are aggravated by standing. Stiffness is present in the morning. Pertinent negatives include no bladder incontinence, bowel incontinence or fever. The treatment provided moderate relief.        The following portions of the patient's history were reviewed and updated as appropriate: allergies, current medications, past family history, past medical history, past social history, past surgical history and problem list.    Review of Systems   Constitutional: Negative for fever.   Gastrointestinal: Negative for bowel incontinence.   Genitourinary: Negative for bladder incontinence.   Musculoskeletal: Positive for arthralgias, arthritis and back pain.       Objective   Physical Exam   Constitutional: She is oriented to person, place, and time. She appears well-developed and well-nourished. No distress.   HENT:   Head: Normocephalic and atraumatic.   Musculoskeletal:        Lumbar back: She exhibits decreased range of motion, tenderness and pain. She exhibits no bony tenderness, no swelling, no edema, no deformity, no laceration and no spasm.   Neurological: She is alert and oriented to person, place, and time.   Reflex Scores:       Patellar reflexes are 2+ on the right side and 2+ on the left side.  Skin: Skin is warm and dry. She is not diaphoretic.   Psychiatric: She has a normal mood and affect. Her behavior is normal. Judgment and thought content normal.   Nursing note and  vitals reviewed.      Assessment/Plan   Problems Addressed this Visit        Nervous and Auditory    Chronic midline low back pain with left-sided sciatica - Primary (Chronic)

## 2018-10-19 RX ORDER — AMLODIPINE BESYLATE 10 MG/1
TABLET ORAL
Qty: 30 TABLET | Refills: 5 | Status: SHIPPED | OUTPATIENT
Start: 2018-10-19 | End: 2019-04-05 | Stop reason: SDUPTHER

## 2018-10-19 RX ORDER — ATENOLOL 100 MG/1
100 TABLET ORAL DAILY
Qty: 30 TABLET | Refills: 5 | Status: SHIPPED | OUTPATIENT
Start: 2018-10-19 | End: 2019-04-05 | Stop reason: SDUPTHER

## 2018-10-19 RX ORDER — TRAZODONE HYDROCHLORIDE 100 MG/1
TABLET ORAL
Qty: 30 TABLET | Refills: 5 | Status: SHIPPED | OUTPATIENT
Start: 2018-10-19 | End: 2019-04-05 | Stop reason: SDUPTHER

## 2018-11-12 ENCOUNTER — APPOINTMENT (OUTPATIENT)
Dept: LAB | Facility: HOSPITAL | Age: 58
End: 2018-11-12

## 2018-11-12 ENCOUNTER — OFFICE VISIT (OUTPATIENT)
Dept: FAMILY MEDICINE CLINIC | Facility: CLINIC | Age: 58
End: 2018-11-12

## 2018-11-12 VITALS
OXYGEN SATURATION: 98 % | HEART RATE: 85 BPM | DIASTOLIC BLOOD PRESSURE: 80 MMHG | HEIGHT: 64 IN | SYSTOLIC BLOOD PRESSURE: 140 MMHG | WEIGHT: 181.7 LBS | BODY MASS INDEX: 31.02 KG/M2

## 2018-11-12 DIAGNOSIS — K21.9 GASTROESOPHAGEAL REFLUX DISEASE WITHOUT ESOPHAGITIS: ICD-10-CM

## 2018-11-12 DIAGNOSIS — I10 ESSENTIAL HYPERTENSION: Primary | ICD-10-CM

## 2018-11-12 DIAGNOSIS — M54.42 CHRONIC MIDLINE LOW BACK PAIN WITH LEFT-SIDED SCIATICA: ICD-10-CM

## 2018-11-12 DIAGNOSIS — M06.9 RHEUMATOID ARTHRITIS OF HAND, UNSPECIFIED LATERALITY, UNSPECIFIED RHEUMATOID FACTOR PRESENCE: ICD-10-CM

## 2018-11-12 DIAGNOSIS — G89.29 CHRONIC MIDLINE LOW BACK PAIN WITH LEFT-SIDED SCIATICA: ICD-10-CM

## 2018-11-12 DIAGNOSIS — F32.A DEPRESSIVE DISORDER: ICD-10-CM

## 2018-11-12 LAB
ALBUMIN SERPL-MCNC: 4.2 G/DL (ref 3.4–4.8)
ALBUMIN/GLOB SERPL: 1.4 G/DL (ref 1.1–1.8)
ALP SERPL-CCNC: 70 U/L (ref 38–126)
ALT SERPL W P-5'-P-CCNC: 29 U/L (ref 9–52)
ANION GAP SERPL CALCULATED.3IONS-SCNC: 6 MMOL/L (ref 5–15)
AST SERPL-CCNC: 34 U/L (ref 14–36)
BILIRUB SERPL-MCNC: 0.3 MG/DL (ref 0.2–1.3)
BUN BLD-MCNC: 12 MG/DL (ref 7–21)
BUN/CREAT SERPL: 13 (ref 7–25)
CALCIUM SPEC-SCNC: 9.2 MG/DL (ref 8.4–10.2)
CHLORIDE SERPL-SCNC: 101 MMOL/L (ref 95–110)
CO2 SERPL-SCNC: 29 MMOL/L (ref 22–31)
CREAT BLD-MCNC: 0.92 MG/DL (ref 0.5–1)
GFR SERPL CREATININE-BSD FRML MDRD: 62 ML/MIN/1.73 (ref 45–104)
GLOBULIN UR ELPH-MCNC: 3.1 GM/DL (ref 2.3–3.5)
GLUCOSE BLD-MCNC: 98 MG/DL (ref 60–100)
MAGNESIUM SERPL-MCNC: 1.8 MG/DL (ref 1.6–2.3)
POTASSIUM BLD-SCNC: 3.4 MMOL/L (ref 3.5–5.1)
PROT SERPL-MCNC: 7.3 G/DL (ref 6.3–8.6)
SODIUM BLD-SCNC: 136 MMOL/L (ref 137–145)
VIT B12 BLD-MCNC: 252 PG/ML (ref 239–931)

## 2018-11-12 PROCEDURE — 82607 VITAMIN B-12: CPT | Performed by: FAMILY MEDICINE

## 2018-11-12 PROCEDURE — 99214 OFFICE O/P EST MOD 30 MIN: CPT | Performed by: FAMILY MEDICINE

## 2018-11-12 PROCEDURE — 80053 COMPREHEN METABOLIC PANEL: CPT | Performed by: FAMILY MEDICINE

## 2018-11-12 PROCEDURE — 36415 COLL VENOUS BLD VENIPUNCTURE: CPT | Performed by: FAMILY MEDICINE

## 2018-11-12 PROCEDURE — 83735 ASSAY OF MAGNESIUM: CPT | Performed by: FAMILY MEDICINE

## 2018-11-12 RX ORDER — HYDROCODONE BITARTRATE AND ACETAMINOPHEN 10; 325 MG/1; MG/1
1 TABLET ORAL EVERY 6 HOURS PRN
Qty: 120 TABLET | Refills: 0 | Status: SHIPPED | OUTPATIENT
Start: 2018-11-12 | End: 2018-12-12 | Stop reason: SDUPTHER

## 2018-11-12 NOTE — PROGRESS NOTES
Subjective   Giselle Valdes is a 58 y.o. female.     Back Pain   This is a chronic problem. The current episode started more than 1 year ago. The problem is unchanged. The pain is present in the lumbar spine. The quality of the pain is described as aching and shooting. The pain radiates to the left foot and right thigh. The pain is at a severity of 7/10. The pain is moderate. The pain is worse during the night. The symptoms are aggravated by standing. Stiffness is present in the morning. Pertinent negatives include no bladder incontinence, bowel incontinence, chest pain or fever.   Arthritis   Presents for follow-up visit. Affected locations include the neck. Pertinent negatives include no fever.   Hypertension   This is a chronic problem. The current episode started more than 1 year ago. The problem is unchanged. The problem is controlled. Pertinent negatives include no chest pain, orthopnea, peripheral edema or shortness of breath.   Heartburn   She complains of heartburn. She reports no chest pain. This is a chronic problem. The current episode started more than 1 year ago. The problem occurs rarely. The problem has been unchanged.   Depression   Visit Type: follow-up  Patient is not experiencing: depressed mood, insomnia, irritability, nervousness/anxiety and shortness of breath.         The following portions of the patient's history were reviewed and updated as appropriate: allergies, current medications, past family history, past medical history, past social history, past surgical history and problem list.    Review of Systems   Constitutional: Negative for fever and irritability.   Respiratory: Negative for shortness of breath.    Cardiovascular: Negative for chest pain and orthopnea.   Gastrointestinal: Positive for heartburn. Negative for bowel incontinence.   Genitourinary: Negative for bladder incontinence.   Musculoskeletal: Positive for arthralgias, arthritis and back pain.    Psychiatric/Behavioral: The patient is not nervous/anxious and does not have insomnia.        Objective   Physical Exam   Constitutional: She is oriented to person, place, and time. She appears well-developed and well-nourished. No distress.   HENT:   Head: Normocephalic and atraumatic.   Cardiovascular: Normal rate, regular rhythm, normal heart sounds and intact distal pulses. Exam reveals no gallop and no friction rub.   No murmur heard.  Pulmonary/Chest: Effort normal and breath sounds normal. No stridor. No respiratory distress. She has no wheezes. She has no rales. She exhibits no tenderness.   Abdominal: Soft. Bowel sounds are normal.   Musculoskeletal:        Lumbar back: She exhibits decreased range of motion, tenderness and pain. She exhibits no bony tenderness, no swelling, no edema, no deformity, no laceration and no spasm.   Neurological: She is alert and oriented to person, place, and time.   Reflex Scores:       Bicep reflexes are 2+ on the right side and 2+ on the left side.       Patellar reflexes are 2+ on the right side and 2+ on the left side.  Skin: Skin is warm and dry. She is not diaphoretic.   Psychiatric: She has a normal mood and affect. Her behavior is normal. Judgment and thought content normal.   Nursing note and vitals reviewed.      Assessment/Plan   Problems Addressed this Visit        Cardiovascular and Mediastinum    Essential hypertension - Primary (Chronic)    Relevant Orders    Comprehensive Metabolic Panel       Digestive    Gastroesophageal reflux disease (Chronic)    Relevant Orders    Vitamin B12    Magnesium       Nervous and Auditory    Chronic midline low back pain with left-sided sciatica (Chronic)       Musculoskeletal and Integument    Rheumatoid arthritis (CMS/HCC) (Chronic)       Other    Depressive disorder          ME= 38

## 2018-11-13 ENCOUNTER — TELEPHONE (OUTPATIENT)
Dept: FAMILY MEDICINE CLINIC | Facility: CLINIC | Age: 58
End: 2018-11-13

## 2018-11-13 DIAGNOSIS — E87.6 LOW SERUM POTASSIUM: Primary | ICD-10-CM

## 2018-11-13 NOTE — PROGRESS NOTES
Pr Dr. Trammell, Ms. Valdes has been called with her recent lab results & recommendations.  Continue her current medications and follow-up as planned or sooner if any problems.    Lab ordered for 2 weeks.

## 2018-11-13 NOTE — TELEPHONE ENCOUNTER
-Pr Dr. Trammell, Ms. Valdes has been called with her recent lab results & recommendations.  Continue her current medications and follow-up as planned or sooner if any problems.    Lab ordered for 2 weeks.       ---- Message from Mikal Trammell MD sent at 11/13/2018  8:17 AM CST -----  Potasium slightly low. Drink OJ or eat bananas daily and recheck K in 2 weeks

## 2018-11-26 RX ORDER — LOSARTAN POTASSIUM 100 MG/1
TABLET ORAL
Qty: 30 TABLET | Refills: 11 | Status: SHIPPED | OUTPATIENT
Start: 2018-11-26 | End: 2019-04-05 | Stop reason: SDUPTHER

## 2018-12-12 ENCOUNTER — OFFICE VISIT (OUTPATIENT)
Dept: FAMILY MEDICINE CLINIC | Facility: CLINIC | Age: 58
End: 2018-12-12

## 2018-12-12 VITALS
BODY MASS INDEX: 31 KG/M2 | SYSTOLIC BLOOD PRESSURE: 124 MMHG | WEIGHT: 181.6 LBS | HEIGHT: 64 IN | HEART RATE: 91 BPM | DIASTOLIC BLOOD PRESSURE: 74 MMHG | OXYGEN SATURATION: 98 %

## 2018-12-12 DIAGNOSIS — G89.29 CHRONIC MIDLINE LOW BACK PAIN WITH LEFT-SIDED SCIATICA: ICD-10-CM

## 2018-12-12 DIAGNOSIS — M06.9 RHEUMATOID ARTHRITIS OF HAND, UNSPECIFIED LATERALITY, UNSPECIFIED RHEUMATOID FACTOR PRESENCE: ICD-10-CM

## 2018-12-12 DIAGNOSIS — M54.42 CHRONIC MIDLINE LOW BACK PAIN WITH LEFT-SIDED SCIATICA: ICD-10-CM

## 2018-12-12 DIAGNOSIS — J40 WHEEZY BRONCHITIS: Primary | ICD-10-CM

## 2018-12-12 PROCEDURE — 99214 OFFICE O/P EST MOD 30 MIN: CPT | Performed by: FAMILY MEDICINE

## 2018-12-12 PROCEDURE — 96372 THER/PROPH/DIAG INJ SC/IM: CPT | Performed by: FAMILY MEDICINE

## 2018-12-12 RX ORDER — SULFAMETHOXAZOLE AND TRIMETHOPRIM 800; 160 MG/1; MG/1
1 TABLET ORAL 2 TIMES DAILY
Qty: 14 TABLET | Refills: 0 | Status: SHIPPED | OUTPATIENT
Start: 2018-12-12 | End: 2018-12-19

## 2018-12-12 RX ORDER — TRIAMCINOLONE ACETONIDE 40 MG/ML
80 INJECTION, SUSPENSION INTRA-ARTICULAR; INTRAMUSCULAR ONCE
Status: COMPLETED | OUTPATIENT
Start: 2018-12-12 | End: 2018-12-12

## 2018-12-12 RX ORDER — ALBUTEROL SULFATE 90 UG/1
2 AEROSOL, METERED RESPIRATORY (INHALATION)
Qty: 8 G | Refills: 11 | Status: SHIPPED | OUTPATIENT
Start: 2018-12-12 | End: 2021-03-17 | Stop reason: SDUPTHER

## 2018-12-12 RX ORDER — HYDROCODONE BITARTRATE AND ACETAMINOPHEN 10; 325 MG/1; MG/1
1 TABLET ORAL EVERY 6 HOURS PRN
Qty: 120 TABLET | Refills: 0 | Status: SHIPPED | OUTPATIENT
Start: 2018-12-12 | End: 2019-01-14 | Stop reason: SDUPTHER

## 2018-12-12 RX ADMIN — TRIAMCINOLONE ACETONIDE 80 MG: 40 INJECTION, SUSPENSION INTRA-ARTICULAR; INTRAMUSCULAR at 15:00

## 2018-12-12 NOTE — PROGRESS NOTES
Subjective   Giselle Valdes is a 58 y.o. female.     Arthritis   Presents for follow-up visit. Affected locations include the neck. Pertinent negatives include no fever.   Back Pain   This is a chronic problem. The current episode started more than 1 year ago. The problem is unchanged. The pain is present in the lumbar spine. The quality of the pain is described as aching and shooting. The pain radiates to the left foot and right thigh. The pain is at a severity of 7/10. The pain is moderate. The pain is worse during the night. The symptoms are aggravated by standing. Stiffness is present in the morning. Pertinent negatives include no bladder incontinence, bowel incontinence or fever. The treatment provided moderate relief.   URI    This is a new problem. The current episode started in the past 7 days. The problem has been waxing and waning. There has been no fever. Associated symptoms include congestion, coughing and wheezing. Pertinent negatives include no rhinorrhea. She has tried nothing for the symptoms. The treatment provided no relief.        The following portions of the patient's history were reviewed and updated as appropriate: allergies, current medications, past family history, past medical history, past social history, past surgical history and problem list.    Review of Systems   Constitutional: Negative for fever.   HENT: Positive for congestion. Negative for rhinorrhea.    Respiratory: Positive for cough and wheezing.    Gastrointestinal: Negative for bowel incontinence.   Genitourinary: Negative for bladder incontinence.   Musculoskeletal: Positive for arthralgias, arthritis and back pain.       Objective   Physical Exam   Constitutional: She is oriented to person, place, and time. She appears well-developed and well-nourished. No distress.   HENT:   Head: Normocephalic and atraumatic.   Nose: No mucosal edema or rhinorrhea.   Mouth/Throat: Uvula is midline, oropharynx is clear and moist and  mucous membranes are normal.   Cardiovascular: Normal rate and regular rhythm. Exam reveals distant heart sounds. Exam reveals no friction rub.   No murmur heard.  Pulmonary/Chest: Effort normal. She has decreased breath sounds. She has wheezes. She has rhonchi. She has no rales.   Musculoskeletal:        Lumbar back: She exhibits decreased range of motion, tenderness and pain. She exhibits no bony tenderness, no swelling, no edema, no deformity, no laceration and no spasm.   Neurological: She is alert and oriented to person, place, and time.   Reflex Scores:       Patellar reflexes are 2+ on the right side and 2+ on the left side.  Skin: Skin is warm and dry. She is not diaphoretic.   Psychiatric: She has a normal mood and affect. Her behavior is normal. Judgment and thought content normal.   Nursing note and vitals reviewed.      Assessment/Plan   Problems Addressed this Visit        Nervous and Auditory    Chronic midline low back pain with left-sided sciatica (Chronic)       Musculoskeletal and Integument    Rheumatoid arthritis (CMS/HCC) (Chronic)      Other Visit Diagnoses     Wheezy bronchitis    -  Primary    Relevant Medications    albuterol 108 (90 Base) MCG/ACT inhaler    triamcinolone acetonide (KENALOG-40) injection 80 mg (Completed)

## 2019-01-14 ENCOUNTER — OFFICE VISIT (OUTPATIENT)
Dept: FAMILY MEDICINE CLINIC | Facility: CLINIC | Age: 59
End: 2019-01-14

## 2019-01-14 ENCOUNTER — LAB (OUTPATIENT)
Dept: LAB | Facility: HOSPITAL | Age: 59
End: 2019-01-14

## 2019-01-14 ENCOUNTER — TELEPHONE (OUTPATIENT)
Dept: FAMILY MEDICINE CLINIC | Facility: CLINIC | Age: 59
End: 2019-01-14

## 2019-01-14 VITALS
OXYGEN SATURATION: 99 % | WEIGHT: 185.6 LBS | SYSTOLIC BLOOD PRESSURE: 130 MMHG | HEIGHT: 64 IN | DIASTOLIC BLOOD PRESSURE: 80 MMHG | BODY MASS INDEX: 31.69 KG/M2 | HEART RATE: 83 BPM

## 2019-01-14 DIAGNOSIS — R25.2 MUSCLE CRAMP: Primary | ICD-10-CM

## 2019-01-14 DIAGNOSIS — E87.6 LOW SERUM POTASSIUM: ICD-10-CM

## 2019-01-14 DIAGNOSIS — G89.29 CHRONIC MIDLINE LOW BACK PAIN WITH LEFT-SIDED SCIATICA: ICD-10-CM

## 2019-01-14 DIAGNOSIS — M54.42 CHRONIC MIDLINE LOW BACK PAIN WITH LEFT-SIDED SCIATICA: ICD-10-CM

## 2019-01-14 DIAGNOSIS — R79.89 ELEVATED SERUM CREATININE: Primary | ICD-10-CM

## 2019-01-14 LAB
ALBUMIN SERPL-MCNC: 4.1 G/DL (ref 3.4–4.8)
ANION GAP SERPL CALCULATED.3IONS-SCNC: 8 MMOL/L (ref 5–15)
BUN BLD-MCNC: 16 MG/DL (ref 7–21)
BUN/CREAT SERPL: 14.8 (ref 7–25)
CALCIUM SPEC-SCNC: 10.1 MG/DL (ref 8.4–10.2)
CHLORIDE SERPL-SCNC: 95 MMOL/L (ref 95–110)
CO2 SERPL-SCNC: 36 MMOL/L (ref 22–31)
CREAT BLD-MCNC: 1.08 MG/DL (ref 0.5–1)
GFR SERPL CREATININE-BSD FRML MDRD: 52 ML/MIN/1.73 (ref 45–104)
GLUCOSE BLD-MCNC: 103 MG/DL (ref 60–100)
PHOSPHATE SERPL-MCNC: 4.8 MG/DL (ref 2.4–4.4)
POTASSIUM BLD-SCNC: 4.4 MMOL/L (ref 3.5–5.1)
SODIUM BLD-SCNC: 139 MMOL/L (ref 137–145)

## 2019-01-14 PROCEDURE — 99213 OFFICE O/P EST LOW 20 MIN: CPT | Performed by: FAMILY MEDICINE

## 2019-01-14 PROCEDURE — 36415 COLL VENOUS BLD VENIPUNCTURE: CPT | Performed by: FAMILY MEDICINE

## 2019-01-14 PROCEDURE — 80069 RENAL FUNCTION PANEL: CPT | Performed by: FAMILY MEDICINE

## 2019-01-14 RX ORDER — HYDROCODONE BITARTRATE AND ACETAMINOPHEN 10; 325 MG/1; MG/1
1 TABLET ORAL EVERY 6 HOURS PRN
Qty: 120 TABLET | Refills: 0 | Status: SHIPPED | OUTPATIENT
Start: 2019-01-14 | End: 2019-02-13 | Stop reason: SDUPTHER

## 2019-01-14 NOTE — TELEPHONE ENCOUNTER
-Pr Dr. Trammell, Ms. Valdes has been called with her recent lab results & recommendations.  Continue her current medications and follow-up as planned or sooner if any problems.    Lab ordered for 1 month      ---- Message from Mikal Trammell MD sent at 1/14/2019  3:16 PM CST -----  Potassium is okay.  Have her try stretching exercises over lower legs one hour before bedtime to see if that helps with her leg cramps.  Her kidneys are little stress.  Have her increase fluid to 6-8 glasses water a day and recheck renal function panel in 1 month.

## 2019-01-14 NOTE — PROGRESS NOTES
Subjective   Giselle Valdes is a 58 y.o. female.     Back Pain   This is a chronic problem. The current episode started more than 1 year ago. The problem is unchanged. The pain is present in the lumbar spine. The quality of the pain is described as aching and shooting. The pain radiates to the left foot and right thigh. The pain is at a severity of 8/10. The pain is moderate. The pain is worse during the night. The symptoms are aggravated by standing. Stiffness is present in the morning. Pertinent negatives include no bladder incontinence or bowel incontinence. The treatment provided moderate relief.        The following portions of the patient's history were reviewed and updated as appropriate: allergies, current medications, past family history, past medical history, past social history, past surgical history and problem list.    Review of Systems   Gastrointestinal: Negative for bowel incontinence.   Genitourinary: Negative for bladder incontinence.   Musculoskeletal: Positive for arthralgias and back pain.       Objective   Physical Exam   Constitutional: She is oriented to person, place, and time. She appears well-developed and well-nourished. No distress.   HENT:   Head: Normocephalic and atraumatic.   Musculoskeletal:        Lumbar back: She exhibits decreased range of motion, tenderness and pain. She exhibits no bony tenderness, no swelling, no edema, no deformity, no laceration and no spasm.   Neurological: She is alert and oriented to person, place, and time.   Reflex Scores:       Patellar reflexes are 2+ on the right side and 2+ on the left side.  Skin: Skin is warm and dry. She is not diaphoretic.   Psychiatric: She has a normal mood and affect. Her behavior is normal. Judgment and thought content normal.   Nursing note and vitals reviewed.      Assessment/Plan   Problems Addressed this Visit        Nervous and Auditory    Chronic midline low back pain with left-sided sciatica (Chronic)      Other  Visit Diagnoses     Muscle cramp    -  Primary    Relevant Orders    Renal Function Panel

## 2019-01-14 NOTE — PROGRESS NOTES
Pr Dr. Trammell, Ms. Valdes has been called with her recent lab results & recommendations.  Continue her current medications and follow-up as planned or sooner if any problems.

## 2019-01-14 NOTE — PROGRESS NOTES
Potassium is okay.  Have her try stretching exercises over lower legs one hour before bedtime to see if that helps with her leg cramps.  Her kidneys are little stress.  Have her increase fluid to 6-8 glasses water a day and recheck renal function panel in 1 month.

## 2019-01-17 RX ORDER — OMEPRAZOLE 20 MG/1
CAPSULE, DELAYED RELEASE ORAL
Qty: 60 CAPSULE | Refills: 5 | Status: SHIPPED | OUTPATIENT
Start: 2019-01-17 | End: 2019-04-05 | Stop reason: SDUPTHER

## 2019-02-13 ENCOUNTER — OFFICE VISIT (OUTPATIENT)
Dept: FAMILY MEDICINE CLINIC | Facility: CLINIC | Age: 59
End: 2019-02-13

## 2019-02-13 VITALS
HEART RATE: 82 BPM | DIASTOLIC BLOOD PRESSURE: 78 MMHG | OXYGEN SATURATION: 98 % | SYSTOLIC BLOOD PRESSURE: 128 MMHG | HEIGHT: 64 IN | WEIGHT: 188.4 LBS | BODY MASS INDEX: 32.17 KG/M2

## 2019-02-13 DIAGNOSIS — G89.29 CHRONIC MIDLINE LOW BACK PAIN WITH LEFT-SIDED SCIATICA: Primary | ICD-10-CM

## 2019-02-13 DIAGNOSIS — M54.42 CHRONIC MIDLINE LOW BACK PAIN WITH LEFT-SIDED SCIATICA: Primary | ICD-10-CM

## 2019-02-13 PROCEDURE — 99213 OFFICE O/P EST LOW 20 MIN: CPT | Performed by: FAMILY MEDICINE

## 2019-02-13 RX ORDER — HYDROCODONE BITARTRATE AND ACETAMINOPHEN 10; 325 MG/1; MG/1
1 TABLET ORAL EVERY 6 HOURS PRN
Qty: 120 TABLET | Refills: 0 | Status: SHIPPED | OUTPATIENT
Start: 2019-02-13 | End: 2019-03-13 | Stop reason: SDUPTHER

## 2019-02-13 NOTE — PROGRESS NOTES
Subjective   Giselle Valdes is a 58 y.o. female.     Back Pain   This is a chronic problem. The current episode started more than 1 year ago. The problem is unchanged. The pain is present in the lumbar spine. The quality of the pain is described as aching and shooting. The pain radiates to the left foot and right thigh. The pain is at a severity of 6/10. The pain is moderate. The pain is worse during the night. The symptoms are aggravated by standing. Stiffness is present in the morning. Pertinent negatives include no bladder incontinence, bowel incontinence or fever. The treatment provided moderate relief.        The following portions of the patient's history were reviewed and updated as appropriate: allergies, current medications, past family history, past medical history, past social history, past surgical history and problem list.    Review of Systems   Constitutional: Negative for fever.   Gastrointestinal: Negative for bowel incontinence.   Genitourinary: Negative for bladder incontinence.   Musculoskeletal: Positive for arthralgias and back pain.       Objective   Physical Exam   Constitutional: She is oriented to person, place, and time. She appears well-developed and well-nourished. No distress.   HENT:   Head: Normocephalic and atraumatic.   Musculoskeletal:        Lumbar back: She exhibits decreased range of motion, tenderness and pain. She exhibits no bony tenderness, no swelling, no edema, no deformity, no laceration and no spasm.   Neurological: She is alert and oriented to person, place, and time.   Reflex Scores:       Patellar reflexes are 2+ on the right side and 2+ on the left side.  Skin: Skin is warm and dry. She is not diaphoretic.   Psychiatric: She has a normal mood and affect. Her behavior is normal. Judgment and thought content normal.   Nursing note and vitals reviewed.      Assessment/Plan   Problems Addressed this Visit        Nervous and Auditory    Chronic midline low back pain  with left-sided sciatica - Primary (Chronic)

## 2019-03-13 ENCOUNTER — LAB (OUTPATIENT)
Dept: LAB | Facility: HOSPITAL | Age: 59
End: 2019-03-13

## 2019-03-13 ENCOUNTER — OFFICE VISIT (OUTPATIENT)
Dept: FAMILY MEDICINE CLINIC | Facility: CLINIC | Age: 59
End: 2019-03-13

## 2019-03-13 VITALS
DIASTOLIC BLOOD PRESSURE: 72 MMHG | OXYGEN SATURATION: 98 % | HEIGHT: 64 IN | HEART RATE: 92 BPM | BODY MASS INDEX: 32.49 KG/M2 | WEIGHT: 190.3 LBS | SYSTOLIC BLOOD PRESSURE: 124 MMHG

## 2019-03-13 DIAGNOSIS — M54.42 CHRONIC MIDLINE LOW BACK PAIN WITH LEFT-SIDED SCIATICA: Primary | ICD-10-CM

## 2019-03-13 DIAGNOSIS — G89.29 CHRONIC MIDLINE LOW BACK PAIN WITH LEFT-SIDED SCIATICA: Primary | ICD-10-CM

## 2019-03-13 DIAGNOSIS — M54.42 CHRONIC MIDLINE LOW BACK PAIN WITH LEFT-SIDED SCIATICA: ICD-10-CM

## 2019-03-13 DIAGNOSIS — G89.29 CHRONIC MIDLINE LOW BACK PAIN WITH LEFT-SIDED SCIATICA: ICD-10-CM

## 2019-03-13 PROCEDURE — 99214 OFFICE O/P EST MOD 30 MIN: CPT | Performed by: FAMILY MEDICINE

## 2019-03-13 PROCEDURE — G0480 DRUG TEST DEF 1-7 CLASSES: HCPCS

## 2019-03-13 PROCEDURE — 80307 DRUG TEST PRSMV CHEM ANLYZR: CPT

## 2019-03-13 PROCEDURE — 36415 COLL VENOUS BLD VENIPUNCTURE: CPT

## 2019-03-13 RX ORDER — HYDROCODONE BITARTRATE AND ACETAMINOPHEN 10; 325 MG/1; MG/1
1 TABLET ORAL EVERY 6 HOURS PRN
Qty: 120 TABLET | Refills: 0 | Status: SHIPPED | OUTPATIENT
Start: 2019-03-13 | End: 2019-04-11 | Stop reason: SDUPTHER

## 2019-03-13 NOTE — PROGRESS NOTES
Subjective   Giselle Valdes is a 58 y.o. female.     Back Pain   This is a chronic problem. The current episode started more than 1 year ago. The problem is unchanged. The pain is present in the lumbar spine. The quality of the pain is described as aching and shooting. The pain radiates to the left foot and right thigh. The pain is at a severity of 7/10. The pain is moderate. The pain is worse during the night. The symptoms are aggravated by standing. Stiffness is present in the morning. Pertinent negatives include no bladder incontinence, bowel incontinence, chest pain or fever.   Arthritis   Presents for follow-up visit. Affected locations include the neck. Pertinent negatives include no fever.   Hypertension   This is a chronic problem. The current episode started more than 1 year ago. The problem is unchanged. The problem is controlled. Pertinent negatives include no chest pain, orthopnea, palpitations, peripheral edema, PND or shortness of breath.   Heartburn   She complains of heartburn. She reports no chest pain. This is a chronic problem. The current episode started more than 1 year ago. The problem occurs occasionally. The problem has been unchanged.   Depression   Visit Type: follow-up  Patient is not experiencing: depressed mood, insomnia, irritability, nervousness/anxiety, palpitations and shortness of breath.         The following portions of the patient's history were reviewed and updated as appropriate: allergies, current medications, past family history, past medical history, past social history, past surgical history and problem list.    Review of Systems   Constitutional: Negative for fever and irritability.   Respiratory: Negative for shortness of breath.    Cardiovascular: Negative for chest pain, palpitations, orthopnea and PND.   Gastrointestinal: Positive for heartburn. Negative for bowel incontinence.   Genitourinary: Negative for bladder incontinence.   Musculoskeletal: Positive for  arthralgias, arthritis and back pain.   Psychiatric/Behavioral: The patient is not nervous/anxious and does not have insomnia.        Objective   Physical Exam   Constitutional: She is oriented to person, place, and time. She appears well-developed and well-nourished. No distress.   HENT:   Head: Normocephalic and atraumatic.   Cardiovascular: Normal rate, regular rhythm, normal heart sounds and intact distal pulses. Exam reveals no gallop and no friction rub.   No murmur heard.  Pulmonary/Chest: Effort normal and breath sounds normal. No stridor. No respiratory distress. She has no wheezes. She has no rales. She exhibits no tenderness.   Abdominal: Soft. Bowel sounds are normal.   Musculoskeletal:        Lumbar back: She exhibits decreased range of motion, tenderness and pain. She exhibits no bony tenderness, no swelling, no edema, no deformity, no laceration and no spasm.   Neurological: She is alert and oriented to person, place, and time.   Reflex Scores:       Bicep reflexes are 2+ on the right side and 2+ on the left side.       Patellar reflexes are 2+ on the right side and 2+ on the left side.  Skin: Skin is warm and dry. She is not diaphoretic.   Psychiatric: She has a normal mood and affect. Her behavior is normal. Judgment and thought content normal.   Nursing note and vitals reviewed.      Assessment/Plan   Problems Addressed this Visit     None          ME= 38

## 2019-03-25 LAB
6MAM SERPLBLD-MCNC: NEGATIVE NG/ML
AMPHETAMINES SERPL QL SCN: NEGATIVE NG/ML
BARBITURATES SERPLBLD QL: NEGATIVE UG/ML
BENZODIAZ SERPL QL: NEGATIVE NG/ML
BZE BLD QL SCN: NEGATIVE NG/ML
CODEINE UR QL: NEGATIVE NG/ML
DHC BLD-MCNC: 3.9 NG/ML
HYDROCODONE SERPL-MCNC: 53.4 NG/ML
HYDROMORPHONE SERPL-MCNC: NEGATIVE NG/ML
METHADONE UR QL: NEGATIVE NG/ML
MORPHINE SERPLBLD-MCNC: NEGATIVE NG/ML
OPIATES SERPL QL SCN: ABNORMAL NG/ML
OPIATES SPEC QL: POSITIVE
OXYCODONE SERPL-MCNC: NEGATIVE NG/ML
OXYCODONE SERPLBLD CFM-MCNC: NEGATIVE NG/ML
OXYCODONE+OXYMORPHONE SERPLBLD CFM-IMP: NEGATIVE
OXYMORPHONE SERPLBLD CFM-MCNC: NEGATIVE NG/ML
PCP SPEC-MCNC: NEGATIVE NG/ML
PROPOXYPH SPEC QL: NEGATIVE NG/ML
THC SERPLBLD CFM-MCNC: NEGATIVE NG/ML

## 2019-04-05 RX ORDER — CYCLOBENZAPRINE HCL 10 MG
10 TABLET ORAL 3 TIMES DAILY PRN
Qty: 270 TABLET | Refills: 1 | Status: SHIPPED | OUTPATIENT
Start: 2019-04-05 | End: 2019-12-11 | Stop reason: SDUPTHER

## 2019-04-05 RX ORDER — ATENOLOL 100 MG/1
100 TABLET ORAL DAILY
Qty: 90 TABLET | Refills: 2 | Status: SHIPPED | OUTPATIENT
Start: 2019-04-05 | End: 2019-07-04

## 2019-04-05 RX ORDER — AMLODIPINE BESYLATE 10 MG/1
10 TABLET ORAL DAILY
Qty: 90 TABLET | Refills: 2 | Status: SHIPPED | OUTPATIENT
Start: 2019-04-05 | End: 2019-12-27

## 2019-04-05 RX ORDER — TRAZODONE HYDROCHLORIDE 100 MG/1
TABLET ORAL
Qty: 90 TABLET | Refills: 2 | Status: SHIPPED | OUTPATIENT
Start: 2019-04-05 | End: 2019-12-10 | Stop reason: SDUPTHER

## 2019-04-05 RX ORDER — OMEPRAZOLE 20 MG/1
20 CAPSULE, DELAYED RELEASE ORAL DAILY
Qty: 180 CAPSULE | Refills: 2 | Status: SHIPPED | OUTPATIENT
Start: 2019-04-05 | End: 2019-12-10

## 2019-04-05 RX ORDER — LOSARTAN POTASSIUM 100 MG/1
100 TABLET ORAL DAILY
Qty: 90 TABLET | Refills: 2 | Status: SHIPPED | OUTPATIENT
Start: 2019-04-05 | End: 2019-12-27

## 2019-04-11 ENCOUNTER — OFFICE VISIT (OUTPATIENT)
Dept: FAMILY MEDICINE CLINIC | Facility: CLINIC | Age: 59
End: 2019-04-11

## 2019-04-11 VITALS
HEART RATE: 83 BPM | SYSTOLIC BLOOD PRESSURE: 128 MMHG | BODY MASS INDEX: 32.61 KG/M2 | HEIGHT: 64 IN | DIASTOLIC BLOOD PRESSURE: 78 MMHG | WEIGHT: 191 LBS | OXYGEN SATURATION: 98 %

## 2019-04-11 DIAGNOSIS — G89.29 CHRONIC MIDLINE LOW BACK PAIN WITH LEFT-SIDED SCIATICA: Primary | ICD-10-CM

## 2019-04-11 DIAGNOSIS — M54.42 CHRONIC MIDLINE LOW BACK PAIN WITH LEFT-SIDED SCIATICA: Primary | ICD-10-CM

## 2019-04-11 PROCEDURE — 99213 OFFICE O/P EST LOW 20 MIN: CPT | Performed by: FAMILY MEDICINE

## 2019-04-11 RX ORDER — HYDROCODONE BITARTRATE AND ACETAMINOPHEN 10; 325 MG/1; MG/1
1 TABLET ORAL EVERY 6 HOURS PRN
Qty: 120 TABLET | Refills: 0 | Status: SHIPPED | OUTPATIENT
Start: 2019-04-11 | End: 2019-05-09 | Stop reason: SDUPTHER

## 2019-04-16 RX ORDER — ATENOLOL 100 MG/1
TABLET ORAL
Qty: 30 TABLET | Refills: 5 | Status: SHIPPED | OUTPATIENT
Start: 2019-04-16 | End: 2019-12-27

## 2019-04-16 RX ORDER — AMLODIPINE BESYLATE 10 MG/1
TABLET ORAL
Qty: 30 TABLET | Refills: 5 | Status: SHIPPED | OUTPATIENT
Start: 2019-04-16 | End: 2019-07-04

## 2019-04-16 RX ORDER — TRAZODONE HYDROCHLORIDE 100 MG/1
TABLET ORAL
Qty: 30 TABLET | Refills: 5 | Status: SHIPPED | OUTPATIENT
Start: 2019-04-16 | End: 2019-12-27

## 2019-05-09 ENCOUNTER — OFFICE VISIT (OUTPATIENT)
Dept: FAMILY MEDICINE CLINIC | Facility: CLINIC | Age: 59
End: 2019-05-09

## 2019-05-09 VITALS
HEART RATE: 95 BPM | BODY MASS INDEX: 32.88 KG/M2 | HEIGHT: 64 IN | WEIGHT: 192.6 LBS | SYSTOLIC BLOOD PRESSURE: 144 MMHG | DIASTOLIC BLOOD PRESSURE: 78 MMHG | OXYGEN SATURATION: 96 %

## 2019-05-09 DIAGNOSIS — M54.42 CHRONIC MIDLINE LOW BACK PAIN WITH LEFT-SIDED SCIATICA: Primary | ICD-10-CM

## 2019-05-09 DIAGNOSIS — G89.29 CHRONIC MIDLINE LOW BACK PAIN WITH LEFT-SIDED SCIATICA: Primary | ICD-10-CM

## 2019-05-09 DIAGNOSIS — M06.9 RHEUMATOID ARTHRITIS OF HAND, UNSPECIFIED LATERALITY, UNSPECIFIED RHEUMATOID FACTOR PRESENCE: ICD-10-CM

## 2019-05-09 PROCEDURE — 96372 THER/PROPH/DIAG INJ SC/IM: CPT | Performed by: FAMILY MEDICINE

## 2019-05-09 PROCEDURE — 99213 OFFICE O/P EST LOW 20 MIN: CPT | Performed by: FAMILY MEDICINE

## 2019-05-09 RX ORDER — HYDROCODONE BITARTRATE AND ACETAMINOPHEN 10; 325 MG/1; MG/1
1 TABLET ORAL EVERY 6 HOURS PRN
Qty: 120 TABLET | Refills: 0 | Status: SHIPPED | OUTPATIENT
Start: 2019-05-09 | End: 2019-06-10 | Stop reason: SDUPTHER

## 2019-05-09 RX ORDER — BETAMETHASONE SODIUM PHOSPHATE AND BETAMETHASONE ACETATE 3; 3 MG/ML; MG/ML
12 INJECTION, SUSPENSION INTRA-ARTICULAR; INTRALESIONAL; INTRAMUSCULAR; SOFT TISSUE ONCE
Status: COMPLETED | OUTPATIENT
Start: 2019-05-09 | End: 2019-05-09

## 2019-05-09 RX ADMIN — BETAMETHASONE SODIUM PHOSPHATE AND BETAMETHASONE ACETATE 12 MG: 3; 3 INJECTION, SUSPENSION INTRA-ARTICULAR; INTRALESIONAL; INTRAMUSCULAR; SOFT TISSUE at 15:37

## 2019-05-09 NOTE — PROGRESS NOTES
Subjective   Giselle Valdes is a 58 y.o. female.     Back Pain   This is a chronic problem. The current episode started more than 1 year ago. The problem is unchanged. The pain is present in the lumbar spine. The quality of the pain is described as aching and shooting. The pain radiates to the left foot and right thigh. The pain is at a severity of 8/10. The pain is moderate. The pain is worse during the night. The symptoms are aggravated by standing. Stiffness is present in the morning. Pertinent negatives include no bladder incontinence, bowel incontinence or fever. The treatment provided moderate relief.        The following portions of the patient's history were reviewed and updated as appropriate: allergies, current medications, past family history, past medical history, past social history, past surgical history and problem list.    Review of Systems   Constitutional: Negative for fever.   Gastrointestinal: Negative for bowel incontinence.   Genitourinary: Negative for bladder incontinence.   Musculoskeletal: Positive for arthralgias and back pain.       Objective   Physical Exam   Constitutional: She is oriented to person, place, and time. She appears well-developed and well-nourished. No distress.   HENT:   Head: Normocephalic and atraumatic.   Musculoskeletal:        Lumbar back: She exhibits decreased range of motion, tenderness and pain. She exhibits no bony tenderness, no swelling, no edema, no deformity, no laceration and no spasm.   Neurological: She is alert and oriented to person, place, and time.   Reflex Scores:       Patellar reflexes are 2+ on the right side and 2+ on the left side.  Skin: Skin is warm and dry. She is not diaphoretic.   Psychiatric: She has a normal mood and affect. Her behavior is normal. Judgment and thought content normal.   Nursing note and vitals reviewed.      Assessment/Plan   Problems Addressed this Visit        Nervous and Auditory    Chronic midline low back pain  with left-sided sciatica - Primary (Chronic)    Relevant Medications    betamethasone acetate-betamethasone sodium phosphate (CELESTONE SOLUSPAN) injection 12 mg (Completed)       Musculoskeletal and Integument    Rheumatoid arthritis (CMS/HCC) (Chronic)    Relevant Medications    betamethasone acetate-betamethasone sodium phosphate (CELESTONE SOLUSPAN) injection 12 mg (Completed)

## 2019-06-10 ENCOUNTER — APPOINTMENT (OUTPATIENT)
Dept: LAB | Facility: HOSPITAL | Age: 59
End: 2019-06-10

## 2019-06-10 ENCOUNTER — OFFICE VISIT (OUTPATIENT)
Dept: FAMILY MEDICINE CLINIC | Facility: CLINIC | Age: 59
End: 2019-06-10

## 2019-06-10 VITALS
HEART RATE: 110 BPM | BODY MASS INDEX: 31.5 KG/M2 | HEIGHT: 64 IN | WEIGHT: 184.5 LBS | DIASTOLIC BLOOD PRESSURE: 60 MMHG | OXYGEN SATURATION: 98 % | SYSTOLIC BLOOD PRESSURE: 118 MMHG

## 2019-06-10 DIAGNOSIS — R11.2 NON-INTRACTABLE VOMITING WITH NAUSEA, UNSPECIFIED VOMITING TYPE: Primary | ICD-10-CM

## 2019-06-10 DIAGNOSIS — G89.29 CHRONIC MIDLINE LOW BACK PAIN WITH LEFT-SIDED SCIATICA: ICD-10-CM

## 2019-06-10 DIAGNOSIS — M54.42 CHRONIC MIDLINE LOW BACK PAIN WITH LEFT-SIDED SCIATICA: ICD-10-CM

## 2019-06-10 DIAGNOSIS — E86.0 DEHYDRATION: ICD-10-CM

## 2019-06-10 DIAGNOSIS — E87.6 HYPOKALEMIA: ICD-10-CM

## 2019-06-10 LAB
ALBUMIN SERPL-MCNC: 4.1 G/DL (ref 3.5–5.2)
ALBUMIN/GLOB SERPL: 1.2 G/DL
ALP SERPL-CCNC: 61 U/L (ref 39–117)
ALT SERPL W P-5'-P-CCNC: 31 U/L (ref 1–33)
ANION GAP SERPL CALCULATED.3IONS-SCNC: 16 MMOL/L
AST SERPL-CCNC: 24 U/L (ref 1–32)
BASOPHILS # BLD AUTO: 0.07 10*3/MM3 (ref 0–0.2)
BASOPHILS NFR BLD AUTO: 0.6 % (ref 0–1.5)
BILIRUB SERPL-MCNC: 0.4 MG/DL (ref 0.2–1.2)
BUN BLD-MCNC: 13 MG/DL (ref 8–23)
BUN/CREAT SERPL: 8.1 (ref 7–25)
CALCIUM SPEC-SCNC: 9.9 MG/DL (ref 8.6–10.5)
CHLORIDE SERPL-SCNC: 100 MMOL/L (ref 98–107)
CO2 SERPL-SCNC: 23 MMOL/L (ref 22–29)
CREAT BLD-MCNC: 1.61 MG/DL (ref 0.57–1)
DEPRECATED RDW RBC AUTO: 44 FL (ref 37–54)
EOSINOPHIL # BLD AUTO: 0.33 10*3/MM3 (ref 0–0.4)
EOSINOPHIL NFR BLD AUTO: 3 % (ref 0.3–6.2)
ERYTHROCYTE [DISTWIDTH] IN BLOOD BY AUTOMATED COUNT: 13.9 % (ref 12.3–15.4)
GFR SERPL CREATININE-BSD FRML MDRD: 33 ML/MIN/1.73
GLOBULIN UR ELPH-MCNC: 3.4 GM/DL
GLUCOSE BLD-MCNC: 135 MG/DL (ref 65–99)
HCT VFR BLD AUTO: 40.9 % (ref 34–46.6)
HGB BLD-MCNC: 13.6 G/DL (ref 12–15.9)
IMM GRANULOCYTES # BLD AUTO: 0.14 10*3/MM3 (ref 0–0.05)
IMM GRANULOCYTES NFR BLD AUTO: 1.3 % (ref 0–0.5)
LYMPHOCYTES # BLD AUTO: 2.52 10*3/MM3 (ref 0.7–3.1)
LYMPHOCYTES NFR BLD AUTO: 23 % (ref 19.6–45.3)
MCH RBC QN AUTO: 29.1 PG (ref 26.6–33)
MCHC RBC AUTO-ENTMCNC: 33.3 G/DL (ref 31.5–35.7)
MCV RBC AUTO: 87.6 FL (ref 79–97)
MONOCYTES # BLD AUTO: 0.93 10*3/MM3 (ref 0.1–0.9)
MONOCYTES NFR BLD AUTO: 8.5 % (ref 5–12)
NEUTROPHILS # BLD AUTO: 6.99 10*3/MM3 (ref 1.7–7)
NEUTROPHILS NFR BLD AUTO: 63.6 % (ref 42.7–76)
NRBC BLD AUTO-RTO: 0 /100 WBC (ref 0–0.2)
PLATELET # BLD AUTO: 398 10*3/MM3 (ref 140–450)
PMV BLD AUTO: 8.8 FL (ref 6–12)
POTASSIUM BLD-SCNC: 2.7 MMOL/L (ref 3.5–5.2)
PROT SERPL-MCNC: 7.5 G/DL (ref 6–8.5)
RBC # BLD AUTO: 4.67 10*6/MM3 (ref 3.77–5.28)
SODIUM BLD-SCNC: 139 MMOL/L (ref 136–145)
WBC NRBC COR # BLD: 10.98 10*3/MM3 (ref 3.4–10.8)

## 2019-06-10 PROCEDURE — 99214 OFFICE O/P EST MOD 30 MIN: CPT | Performed by: FAMILY MEDICINE

## 2019-06-10 PROCEDURE — 85025 COMPLETE CBC W/AUTO DIFF WBC: CPT | Performed by: FAMILY MEDICINE

## 2019-06-10 PROCEDURE — 96361 HYDRATE IV INFUSION ADD-ON: CPT | Performed by: FAMILY MEDICINE

## 2019-06-10 PROCEDURE — 80053 COMPREHEN METABOLIC PANEL: CPT | Performed by: FAMILY MEDICINE

## 2019-06-10 PROCEDURE — 36415 COLL VENOUS BLD VENIPUNCTURE: CPT | Performed by: FAMILY MEDICINE

## 2019-06-10 PROCEDURE — 96360 HYDRATION IV INFUSION INIT: CPT | Performed by: FAMILY MEDICINE

## 2019-06-10 RX ORDER — HYDROCODONE BITARTRATE AND ACETAMINOPHEN 10; 325 MG/1; MG/1
1 TABLET ORAL EVERY 6 HOURS PRN
Qty: 120 TABLET | Refills: 0 | Status: SHIPPED | OUTPATIENT
Start: 2019-06-10 | End: 2019-07-10 | Stop reason: SDUPTHER

## 2019-06-10 RX ORDER — POTASSIUM CHLORIDE 750 MG/1
10 TABLET, FILM COATED, EXTENDED RELEASE ORAL SEE ADMIN INSTRUCTIONS
Qty: 30 TABLET | Refills: 0 | Status: SHIPPED | OUTPATIENT
Start: 2019-06-10 | End: 2020-12-21

## 2019-06-10 RX ORDER — PROMETHAZINE HYDROCHLORIDE 25 MG/1
25 TABLET ORAL EVERY 6 HOURS PRN
Qty: 20 TABLET | Refills: 0 | Status: SHIPPED | OUTPATIENT
Start: 2019-06-10 | End: 2020-12-21

## 2019-06-10 NOTE — PROGRESS NOTES
Subjective   Giselle Valdes is a 58 y.o. female.     Back Pain   This is a chronic problem. The current episode started more than 1 year ago. The problem is unchanged. The pain is present in the lumbar spine. The quality of the pain is described as aching and shooting. The pain radiates to the left foot and right thigh. The pain is at a severity of 8/10. The pain is moderate. The pain is worse during the night. The symptoms are aggravated by standing. Stiffness is present in the morning. Pertinent negatives include no bladder incontinence, bowel incontinence or fever. The treatment provided moderate relief.   Nausea   This is a new problem. The current episode started in the past 7 days. The problem occurs constantly. The problem has been unchanged. Associated symptoms include arthralgias, nausea and vomiting. Pertinent negatives include no fever. Nothing aggravates the symptoms. She has tried nothing for the symptoms. The treatment provided no relief.        The following portions of the patient's history were reviewed and updated as appropriate: allergies, current medications, past family history, past medical history, past social history, past surgical history and problem list.    Review of Systems   Constitutional: Negative for fever.   Gastrointestinal: Positive for diarrhea, nausea and vomiting. Negative for blood in stool and bowel incontinence.   Genitourinary: Negative for bladder incontinence.   Musculoskeletal: Positive for arthralgias and back pain.       Objective   Physical Exam   Constitutional: She is oriented to person, place, and time. She appears well-developed and well-nourished. No distress.   HENT:   Head: Normocephalic and atraumatic.   Mouth/Throat: Mucous membranes are dry. No oropharyngeal exudate, posterior oropharyngeal edema, posterior oropharyngeal erythema or tonsillar abscesses.   Cardiovascular: Normal rate and regular rhythm.   Pulmonary/Chest: Effort normal and breath sounds  normal.   Abdominal: Soft. Bowel sounds are normal.   Musculoskeletal:        Lumbar back: She exhibits decreased range of motion, tenderness and pain. She exhibits no bony tenderness, no swelling, no edema, no deformity, no laceration and no spasm.   Neurological: She is alert and oriented to person, place, and time.   Reflex Scores:       Patellar reflexes are 2+ on the right side and 2+ on the left side.  Skin: Skin is warm and dry. She is not diaphoretic.   Psychiatric: She has a normal mood and affect. Her behavior is normal. Judgment and thought content normal.   Nursing note and vitals reviewed.      Assessment/Plan   Problems Addressed this Visit        Nervous and Auditory    Chronic midline low back pain with left-sided sciatica (Chronic)      Other Visit Diagnoses     Non-intractable vomiting with nausea, unspecified vomiting type    -  Primary    Relevant Orders    Comprehensive Metabolic Panel (Completed)    CBC & Differential (Completed)    CBC Auto Differential (Completed)    Dehydration        Relevant Orders    Renal Function Panel    Hypokalemia        Relevant Orders    Renal Function Panel          Patient given 2 L of IV normal saline over 2-1/2 hours in the office today.  Return in the morning for stat renal function panel to reassess potassium and creatinine.

## 2019-06-10 NOTE — PROGRESS NOTES
Subjective   Giselle Valdes is a 58 y.o. female.     Back Pain     Nausea   Associated symptoms include nausea.        {Common H&P Review Areas:11530}    Review of Systems   Gastrointestinal: Positive for nausea.   Musculoskeletal: Positive for back pain.       Objective   Physical Exam    Assessment/Plan   {Assess/PlanSmartLinks:58827}

## 2019-06-11 ENCOUNTER — LAB (OUTPATIENT)
Dept: LAB | Facility: HOSPITAL | Age: 59
End: 2019-06-11

## 2019-06-11 DIAGNOSIS — E86.0 DEHYDRATION: ICD-10-CM

## 2019-06-11 DIAGNOSIS — E87.6 HYPOKALEMIA: ICD-10-CM

## 2019-06-11 LAB
ALBUMIN SERPL-MCNC: 4.2 G/DL (ref 3.5–5.2)
ANION GAP SERPL CALCULATED.3IONS-SCNC: 13 MMOL/L
BUN BLD-MCNC: 12 MG/DL (ref 8–23)
BUN/CREAT SERPL: 11 (ref 7–25)
CALCIUM SPEC-SCNC: 11 MG/DL (ref 8.6–10.5)
CHLORIDE SERPL-SCNC: 102 MMOL/L (ref 98–107)
CO2 SERPL-SCNC: 23 MMOL/L (ref 22–29)
CREAT BLD-MCNC: 1.09 MG/DL (ref 0.57–1)
GFR SERPL CREATININE-BSD FRML MDRD: 51 ML/MIN/1.73
GLUCOSE BLD-MCNC: 125 MG/DL (ref 65–99)
PHOSPHATE SERPL-MCNC: 3.1 MG/DL (ref 2.5–4.5)
POTASSIUM BLD-SCNC: 2.8 MMOL/L (ref 3.5–5.2)
SODIUM BLD-SCNC: 138 MMOL/L (ref 136–145)

## 2019-06-11 PROCEDURE — 36415 COLL VENOUS BLD VENIPUNCTURE: CPT

## 2019-06-11 PROCEDURE — 80069 RENAL FUNCTION PANEL: CPT

## 2019-06-18 ENCOUNTER — TELEPHONE (OUTPATIENT)
Dept: FAMILY MEDICINE CLINIC | Facility: CLINIC | Age: 59
End: 2019-06-18

## 2019-06-18 DIAGNOSIS — E87.6 HYPOKALEMIA: ICD-10-CM

## 2019-06-18 DIAGNOSIS — E86.0 DEHYDRATION: Primary | ICD-10-CM

## 2019-06-18 NOTE — TELEPHONE ENCOUNTER
Per Dr. Trammell, Ms. Valdes has been called with recent lab results & recommendations.  Continue current medications and follow-up as planned or sooner if any problems.  She will come in this week and have her Renal Function Panel rechecked.  Order placed.    ----- Message from Mikal Trammell MD sent at 6/18/2019 10:18 AM CDT -----  Potassium is still low.  Recheck renal function panel.  Has her nausea and vomiting improved

## 2019-06-18 NOTE — PROGRESS NOTES
Per Dr. Trammell, Ms. Valdes has been called with recent lab results & recommendations.  Continue current medications and follow-up as planned or sooner if any problems.  She will come in this week and have her Renal Function Panel rechecked.  Order placed.

## 2019-06-26 ENCOUNTER — LAB (OUTPATIENT)
Dept: LAB | Facility: HOSPITAL | Age: 59
End: 2019-06-26

## 2019-06-26 DIAGNOSIS — E86.0 DEHYDRATION: ICD-10-CM

## 2019-06-26 DIAGNOSIS — E87.6 HYPOKALEMIA: ICD-10-CM

## 2019-06-26 PROCEDURE — 36415 COLL VENOUS BLD VENIPUNCTURE: CPT

## 2019-06-26 PROCEDURE — 80069 RENAL FUNCTION PANEL: CPT

## 2019-06-27 LAB
ALBUMIN SERPL-MCNC: 3.5 G/DL (ref 3.5–5.2)
ANION GAP SERPL CALCULATED.3IONS-SCNC: 10.6 MMOL/L (ref 5–15)
BUN BLD-MCNC: 5 MG/DL (ref 8–23)
BUN/CREAT SERPL: 6.4 (ref 7–25)
CALCIUM SPEC-SCNC: 8.7 MG/DL (ref 8.6–10.5)
CHLORIDE SERPL-SCNC: 101 MMOL/L (ref 98–107)
CO2 SERPL-SCNC: 30.4 MMOL/L (ref 22–29)
CREAT BLD-MCNC: 0.78 MG/DL (ref 0.57–1)
GFR SERPL CREATININE-BSD FRML MDRD: 75 ML/MIN/1.73
GLUCOSE BLD-MCNC: 110 MG/DL (ref 65–99)
PHOSPHATE SERPL-MCNC: 2.7 MG/DL (ref 2.5–4.5)
POTASSIUM BLD-SCNC: 3.3 MMOL/L (ref 3.5–5.2)
SODIUM BLD-SCNC: 142 MMOL/L (ref 136–145)

## 2019-06-28 ENCOUNTER — TELEPHONE (OUTPATIENT)
Dept: FAMILY MEDICINE CLINIC | Facility: CLINIC | Age: 59
End: 2019-06-28

## 2019-06-28 DIAGNOSIS — E87.6 HYPOKALEMIA: Primary | ICD-10-CM

## 2019-06-28 NOTE — PROGRESS NOTES
Potassium improved but still slightly low.  If she is taking one potassium pill a day have her increase it to 1 twice daily.  Recheck potassium in 2 weeks.  Please change med list

## 2019-06-28 NOTE — TELEPHONE ENCOUNTER
-Per Dr. Trammell, Ms. Valdes has been called with recent lab results & recommendations.  Continue current medications and follow-up as planned or sooner if any problems.  Lab ordered     Dr. Trammell,  Ms. Valdes states she misunderstood instructions on taking the Potassium.  She took the initial dose of 2 when she got the script, 2 tabs two hours later and 2 tabs in the AM the next day. (for a total of 6 tabs in 24 hours)  She then stopped it and has not taken any more.    I instructed her to restart it at 1 daily and have her labs repeated either early the morning of her appointment or the day before her appointment on 07/10/19 @ 1:00  Please Advise if any further    ---- Message from Mikal Trammell MD sent at 6/28/2019  2:39 PM CDT -----  Potassium improved but still slightly low.  If she is taking one potassium pill a day have her increase it to 1 twice daily.  Recheck potassium in 2 weeks.  Please change med list

## 2019-06-28 NOTE — PROGRESS NOTES
Per Dr. Trammell, Ms. Valdes has been called with recent lab results & recommendations.  Continue current medications and follow-up as planned or sooner if any problems.    Dr. Trammell,  Ms. Valdes states she misunderstood instructions on taking the Potassium.  She took the initial dose of 2 when she got the script, 2 tabs two hours later and 2 tabs in the AM the next day. (for a total of 6 tabs in 24 hours)  She then stopped it and has not taken any more.    I instructed her to restart it at 1 daily and have her labs repeated either early the morning of her appointment or the day before her appointment on 07/10/19 @ 1:00  Please Advise if any further

## 2019-07-10 ENCOUNTER — OFFICE VISIT (OUTPATIENT)
Dept: FAMILY MEDICINE CLINIC | Facility: CLINIC | Age: 59
End: 2019-07-10

## 2019-07-10 ENCOUNTER — LAB (OUTPATIENT)
Dept: LAB | Facility: HOSPITAL | Age: 59
End: 2019-07-10

## 2019-07-10 VITALS
HEART RATE: 84 BPM | OXYGEN SATURATION: 98 % | WEIGHT: 189 LBS | DIASTOLIC BLOOD PRESSURE: 80 MMHG | HEIGHT: 64 IN | SYSTOLIC BLOOD PRESSURE: 134 MMHG | BODY MASS INDEX: 32.27 KG/M2

## 2019-07-10 DIAGNOSIS — E87.6 HYPOKALEMIA: ICD-10-CM

## 2019-07-10 DIAGNOSIS — G89.29 CHRONIC MIDLINE LOW BACK PAIN WITH LEFT-SIDED SCIATICA: Primary | ICD-10-CM

## 2019-07-10 DIAGNOSIS — M54.42 CHRONIC MIDLINE LOW BACK PAIN WITH LEFT-SIDED SCIATICA: Primary | ICD-10-CM

## 2019-07-10 LAB — POTASSIUM BLD-SCNC: 4.3 MMOL/L (ref 3.5–5.2)

## 2019-07-10 PROCEDURE — 36415 COLL VENOUS BLD VENIPUNCTURE: CPT

## 2019-07-10 PROCEDURE — 84132 ASSAY OF SERUM POTASSIUM: CPT

## 2019-07-10 PROCEDURE — 99213 OFFICE O/P EST LOW 20 MIN: CPT | Performed by: FAMILY MEDICINE

## 2019-07-10 RX ORDER — HYDROCODONE BITARTRATE AND ACETAMINOPHEN 10; 325 MG/1; MG/1
1 TABLET ORAL EVERY 6 HOURS PRN
Qty: 120 TABLET | Refills: 0 | Status: SHIPPED | OUTPATIENT
Start: 2019-07-10 | End: 2019-08-12 | Stop reason: SDUPTHER

## 2019-07-10 NOTE — PROGRESS NOTES
Subjective   Giselle Valdes is a 58 y.o. female.     Back Pain   This is a chronic problem. The current episode started more than 1 year ago. The problem is unchanged. The pain is present in the lumbar spine. The quality of the pain is described as aching and shooting. The pain radiates to the left foot and right thigh. The pain is at a severity of 7/10. The pain is moderate. The pain is worse during the night. The symptoms are aggravated by standing. Stiffness is present in the morning. Pertinent negatives include no bladder incontinence, bowel incontinence or fever. The treatment provided moderate relief.        The following portions of the patient's history were reviewed and updated as appropriate: allergies, current medications, past family history, past medical history, past social history, past surgical history and problem list.    Review of Systems   Constitutional: Negative for fever.   Gastrointestinal: Negative for bowel incontinence.   Genitourinary: Negative for bladder incontinence.   Musculoskeletal: Positive for arthralgias and back pain.       Objective   Physical Exam   Constitutional: She is oriented to person, place, and time. She appears well-developed and well-nourished. No distress.   HENT:   Head: Normocephalic and atraumatic.   Cardiovascular: Normal rate and regular rhythm.   Pulmonary/Chest: Effort normal and breath sounds normal.   Musculoskeletal:        Lumbar back: She exhibits decreased range of motion, tenderness and pain. She exhibits no bony tenderness, no swelling, no edema, no deformity, no laceration and no spasm.   Neurological: She is alert and oriented to person, place, and time.   Reflex Scores:       Patellar reflexes are 2+ on the right side and 2+ on the left side.  Skin: Skin is warm and dry. She is not diaphoretic.   Psychiatric: She has a normal mood and affect. Her behavior is normal. Judgment and thought content normal.   Nursing note and vitals  reviewed.      Assessment/Plan   Problems Addressed this Visit        Nervous and Auditory    Chronic midline low back pain with left-sided sciatica - Primary (Chronic)

## 2019-07-12 ENCOUNTER — TELEPHONE (OUTPATIENT)
Dept: FAMILY MEDICINE CLINIC | Facility: CLINIC | Age: 59
End: 2019-07-12

## 2019-07-12 NOTE — PROGRESS NOTES
Per Dr. Trammell, Ms. Valdes has been called with recent lab results & recommendations.  Continue current medications and follow-up as planned or sooner if any problems.

## 2019-07-12 NOTE — TELEPHONE ENCOUNTER
Per Dr. Trammell, Ms. Valdes has been called with recent lab results & recommendations.  Continue current medications and follow-up as planned or sooner if any problems.      ----- Message from Mikal Trammell MD sent at 7/11/2019  2:53 PM CDT -----  Ok, call or send card.

## 2019-08-12 ENCOUNTER — OFFICE VISIT (OUTPATIENT)
Dept: FAMILY MEDICINE CLINIC | Facility: CLINIC | Age: 59
End: 2019-08-12

## 2019-08-12 VITALS
HEIGHT: 64 IN | SYSTOLIC BLOOD PRESSURE: 130 MMHG | HEART RATE: 78 BPM | WEIGHT: 189.6 LBS | OXYGEN SATURATION: 98 % | DIASTOLIC BLOOD PRESSURE: 78 MMHG | BODY MASS INDEX: 32.37 KG/M2

## 2019-08-12 DIAGNOSIS — G89.29 CHRONIC MIDLINE LOW BACK PAIN WITH LEFT-SIDED SCIATICA: Primary | ICD-10-CM

## 2019-08-12 DIAGNOSIS — M54.42 CHRONIC MIDLINE LOW BACK PAIN WITH LEFT-SIDED SCIATICA: Primary | ICD-10-CM

## 2019-08-12 PROCEDURE — 99213 OFFICE O/P EST LOW 20 MIN: CPT | Performed by: FAMILY MEDICINE

## 2019-08-12 RX ORDER — HYDROCODONE BITARTRATE AND ACETAMINOPHEN 10; 325 MG/1; MG/1
1 TABLET ORAL EVERY 6 HOURS PRN
Qty: 120 TABLET | Refills: 0 | Status: SHIPPED | OUTPATIENT
Start: 2019-08-12 | End: 2019-08-12 | Stop reason: SDUPTHER

## 2019-08-12 RX ORDER — HYDROCODONE BITARTRATE AND ACETAMINOPHEN 10; 325 MG/1; MG/1
1 TABLET ORAL EVERY 6 HOURS PRN
Qty: 120 TABLET | Refills: 0 | Status: SHIPPED | OUTPATIENT
Start: 2019-08-12 | End: 2019-09-11 | Stop reason: SDUPTHER

## 2019-08-12 NOTE — PROGRESS NOTES
Subjective   Giselle Valdes is a 59 y.o. female.     Back Pain   This is a chronic problem. The current episode started more than 1 year ago. The problem is unchanged. The pain is present in the lumbar spine. The quality of the pain is described as aching and shooting. The pain radiates to the left foot and right thigh. The pain is at a severity of 6/10. The pain is moderate. The pain is worse during the night. The symptoms are aggravated by standing. Stiffness is present in the morning. Pertinent negatives include no bladder incontinence, bowel incontinence or fever. The treatment provided moderate relief.        The following portions of the patient's history were reviewed and updated as appropriate: allergies, current medications, past family history, past medical history, past social history, past surgical history and problem list.    Review of Systems   Constitutional: Negative for fever.   Gastrointestinal: Negative for bowel incontinence.   Genitourinary: Negative for bladder incontinence.   Musculoskeletal: Positive for arthralgias and back pain.       Objective   Physical Exam   Constitutional: She is oriented to person, place, and time. She appears well-developed and well-nourished. No distress.   HENT:   Head: Normocephalic and atraumatic.   Cardiovascular: Normal rate and regular rhythm.   Pulmonary/Chest: Effort normal and breath sounds normal.   Musculoskeletal:        Lumbar back: She exhibits decreased range of motion, tenderness and pain. She exhibits no bony tenderness, no swelling, no edema, no deformity, no laceration and no spasm.   Neurological: She is alert and oriented to person, place, and time.   Reflex Scores:       Patellar reflexes are 2+ on the right side and 2+ on the left side.  Skin: Skin is warm and dry. She is not diaphoretic.   Psychiatric: She has a normal mood and affect. Her behavior is normal. Judgment and thought content normal.   Nursing note and vitals  reviewed.      Assessment/Plan   Problems Addressed this Visit        Nervous and Auditory    Chronic midline low back pain with left-sided sciatica - Primary (Chronic)

## 2019-09-11 ENCOUNTER — OFFICE VISIT (OUTPATIENT)
Dept: FAMILY MEDICINE CLINIC | Facility: CLINIC | Age: 59
End: 2019-09-11

## 2019-09-11 VITALS
SYSTOLIC BLOOD PRESSURE: 120 MMHG | HEART RATE: 79 BPM | HEIGHT: 64 IN | WEIGHT: 188 LBS | OXYGEN SATURATION: 97 % | BODY MASS INDEX: 32.1 KG/M2 | DIASTOLIC BLOOD PRESSURE: 68 MMHG

## 2019-09-11 DIAGNOSIS — M54.42 CHRONIC MIDLINE LOW BACK PAIN WITH LEFT-SIDED SCIATICA: ICD-10-CM

## 2019-09-11 DIAGNOSIS — M06.9 RHEUMATOID ARTHRITIS OF HAND, UNSPECIFIED LATERALITY, UNSPECIFIED RHEUMATOID FACTOR PRESENCE: Primary | ICD-10-CM

## 2019-09-11 DIAGNOSIS — G89.29 CHRONIC MIDLINE LOW BACK PAIN WITH LEFT-SIDED SCIATICA: ICD-10-CM

## 2019-09-11 PROCEDURE — 96372 THER/PROPH/DIAG INJ SC/IM: CPT | Performed by: FAMILY MEDICINE

## 2019-09-11 PROCEDURE — 99213 OFFICE O/P EST LOW 20 MIN: CPT | Performed by: FAMILY MEDICINE

## 2019-09-11 RX ORDER — HYDROCODONE BITARTRATE AND ACETAMINOPHEN 10; 325 MG/1; MG/1
1 TABLET ORAL EVERY 6 HOURS PRN
Qty: 120 TABLET | Refills: 0 | Status: SHIPPED | OUTPATIENT
Start: 2019-09-11 | End: 2019-10-14 | Stop reason: SDUPTHER

## 2019-09-11 RX ORDER — TRIAMCINOLONE ACETONIDE 40 MG/ML
80 INJECTION, SUSPENSION INTRA-ARTICULAR; INTRAMUSCULAR ONCE
Status: COMPLETED | OUTPATIENT
Start: 2019-09-11 | End: 2019-09-11

## 2019-09-11 RX ADMIN — TRIAMCINOLONE ACETONIDE 80 MG: 40 INJECTION, SUSPENSION INTRA-ARTICULAR; INTRAMUSCULAR at 14:33

## 2019-09-11 NOTE — PROGRESS NOTES
Subjective   Giselle Valdes is a 59 y.o. female.     Back Pain   This is a chronic problem. The current episode started more than 1 year ago. The problem is unchanged. The pain is present in the lumbar spine. The quality of the pain is described as aching and shooting. The pain radiates to the left foot and right thigh. The pain is at a severity of 6/10. The pain is moderate. The pain is worse during the night. The symptoms are aggravated by standing. Stiffness is present in the morning. Pertinent negatives include no bladder incontinence, bowel incontinence or fever. The treatment provided moderate relief.        The following portions of the patient's history were reviewed and updated as appropriate: allergies, current medications, past family history, past medical history, past social history, past surgical history and problem list.    Review of Systems   Constitutional: Negative for fever.   Gastrointestinal: Negative for bowel incontinence.   Genitourinary: Negative for bladder incontinence.   Musculoskeletal: Positive for arthralgias and back pain.       Objective   Physical Exam   Constitutional: She is oriented to person, place, and time. She appears well-developed and well-nourished. No distress.   HENT:   Head: Normocephalic and atraumatic.   Cardiovascular: Normal rate and regular rhythm.   Pulmonary/Chest: Effort normal and breath sounds normal.   Musculoskeletal:        Lumbar back: She exhibits decreased range of motion, tenderness and pain. She exhibits no bony tenderness, no swelling, no edema, no deformity, no laceration and no spasm.   Neurological: She is alert and oriented to person, place, and time.   Reflex Scores:       Patellar reflexes are 2+ on the right side and 2+ on the left side.  Skin: Skin is warm and dry. She is not diaphoretic.   Psychiatric: She has a normal mood and affect. Her behavior is normal. Judgment and thought content normal.   Nursing note and vitals  reviewed.      Assessment/Plan   Problems Addressed this Visit     None

## 2019-09-11 NOTE — PROGRESS NOTES
Subjective   Giselle Valdes is a 59 y.o. female.     Back Pain   This is a chronic problem. The current episode started more than 1 year ago. The problem is unchanged. The pain is present in the lumbar spine. The quality of the pain is described as aching and shooting. The pain radiates to the left foot and right thigh. The pain is at a severity of 6/10. The pain is moderate. The pain is worse during the night. The symptoms are aggravated by standing. Stiffness is present in the morning. Pertinent negatives include no bladder incontinence, bowel incontinence or fever.   Arthritis   Presents for follow-up visit. She complains of pain, stiffness and joint swelling. Affected locations include the right wrist, left wrist, right MCP, left MCP, right PIP, left PIP, left DIP and right knee. Her pain is at a severity of 8/10. Pertinent negatives include no fever.        The following portions of the patient's history were reviewed and updated as appropriate: allergies, current medications, past family history, past medical history, past social history, past surgical history and problem list.    Review of Systems   Constitutional: Negative for fever.   Gastrointestinal: Negative for bowel incontinence.   Genitourinary: Negative for bladder incontinence.   Musculoskeletal: Positive for arthralgias, arthritis, back pain, joint swelling and stiffness.       Objective   Physical Exam   Constitutional: She is oriented to person, place, and time. She appears well-developed and well-nourished. No distress.   HENT:   Head: Normocephalic and atraumatic.   Musculoskeletal:        Lumbar back: She exhibits decreased range of motion, tenderness and pain. She exhibits no bony tenderness, no swelling, no edema, no deformity, no laceration and no spasm.        Right hand: She exhibits decreased range of motion and tenderness.        Left hand: She exhibits decreased range of motion and tenderness.   Neurological: She is alert and  oriented to person, place, and time.   Reflex Scores:       Bicep reflexes are 2+ on the right side and 2+ on the left side.       Patellar reflexes are 2+ on the right side and 2+ on the left side.  Skin: Skin is warm and dry. She is not diaphoretic.   Psychiatric: She has a normal mood and affect. Her behavior is normal. Judgment and thought content normal.   Nursing note and vitals reviewed.      Assessment/Plan   Problems Addressed this Visit        Nervous and Auditory    Chronic midline low back pain with left-sided sciatica (Chronic)       Musculoskeletal and Integument    Rheumatoid arthritis (CMS/HCC) - Primary (Chronic)    Relevant Medications    triamcinolone acetonide (KENALOG-40) injection 80 mg (Completed)          ME= 40  She is having more pain from rheumatoid arthritis.  She had gone off of her biologic due to concerns about side effects.

## 2019-11-12 ENCOUNTER — OFFICE VISIT (OUTPATIENT)
Dept: FAMILY MEDICINE CLINIC | Facility: CLINIC | Age: 59
End: 2019-11-12

## 2019-11-12 VITALS
WEIGHT: 194.31 LBS | OXYGEN SATURATION: 95 % | HEIGHT: 64 IN | SYSTOLIC BLOOD PRESSURE: 104 MMHG | DIASTOLIC BLOOD PRESSURE: 72 MMHG | BODY MASS INDEX: 33.17 KG/M2 | HEART RATE: 91 BPM

## 2019-11-12 DIAGNOSIS — M06.9 RHEUMATOID ARTHRITIS OF HAND, UNSPECIFIED LATERALITY, UNSPECIFIED RHEUMATOID FACTOR PRESENCE: Primary | ICD-10-CM

## 2019-11-12 DIAGNOSIS — Z23 FLU VACCINE NEED: ICD-10-CM

## 2019-11-12 DIAGNOSIS — G89.29 CHRONIC MIDLINE LOW BACK PAIN WITH LEFT-SIDED SCIATICA: ICD-10-CM

## 2019-11-12 DIAGNOSIS — M54.42 CHRONIC MIDLINE LOW BACK PAIN WITH LEFT-SIDED SCIATICA: ICD-10-CM

## 2019-11-12 DIAGNOSIS — E66.09 CLASS 1 OBESITY DUE TO EXCESS CALORIES WITH SERIOUS COMORBIDITY AND BODY MASS INDEX (BMI) OF 33.0 TO 33.9 IN ADULT: ICD-10-CM

## 2019-11-12 PROCEDURE — 90674 CCIIV4 VAC NO PRSV 0.5 ML IM: CPT | Performed by: FAMILY MEDICINE

## 2019-11-12 PROCEDURE — 90471 IMMUNIZATION ADMIN: CPT | Performed by: FAMILY MEDICINE

## 2019-11-12 PROCEDURE — 99213 OFFICE O/P EST LOW 20 MIN: CPT | Performed by: FAMILY MEDICINE

## 2019-11-12 RX ORDER — HYDROCODONE BITARTRATE AND ACETAMINOPHEN 10; 325 MG/1; MG/1
1 TABLET ORAL EVERY 6 HOURS PRN
Qty: 120 TABLET | Refills: 0 | Status: SHIPPED | OUTPATIENT
Start: 2019-11-12 | End: 2019-12-10 | Stop reason: SDUPTHER

## 2019-11-12 NOTE — PROGRESS NOTES
Subjective   Giselle Valdes is a 59 y.o. female.     Back Pain   This is a chronic problem. The current episode started more than 1 year ago. The problem is unchanged. The pain is present in the lumbar spine. The quality of the pain is described as aching and shooting. The pain radiates to the left foot and right thigh. The pain is at a severity of 7/10. The pain is moderate. The pain is worse during the night. The symptoms are aggravated by standing. Stiffness is present in the morning. Pertinent negatives include no bladder incontinence, bowel incontinence or fever.   Arthritis   Presents for follow-up visit. She complains of pain, stiffness and joint swelling. Affected locations include the right wrist, left wrist, right MCP, left MCP, right PIP, left PIP, left DIP and right knee. Her pain is at a severity of 8/10. Pertinent negatives include no fever. Her past medical history is significant for rheumatoid arthritis.   Rheumatoid Arthritis   Associated symptoms include arthralgias and joint swelling. Pertinent negatives include no fever.        The following portions of the patient's history were reviewed and updated as appropriate: allergies, current medications, past family history, past medical history, past social history, past surgical history and problem list.    Review of Systems   Constitutional: Negative for fever.   Gastrointestinal: Negative for bowel incontinence.   Genitourinary: Negative for bladder incontinence.   Musculoskeletal: Positive for arthralgias, arthritis, back pain, joint swelling and stiffness.       Objective   Physical Exam   Constitutional: She is oriented to person, place, and time. She appears well-developed and well-nourished. No distress.   HENT:   Head: Normocephalic and atraumatic.   Musculoskeletal:        Lumbar back: She exhibits decreased range of motion, tenderness and pain. She exhibits no bony tenderness, no swelling, no edema, no deformity, no laceration and no  "spasm.        Right hand: She exhibits decreased range of motion and tenderness.        Left hand: She exhibits decreased range of motion and tenderness.   Neurological: She is alert and oriented to person, place, and time.   Reflex Scores:       Bicep reflexes are 2+ on the right side and 2+ on the left side.       Patellar reflexes are 2+ on the right side and 2+ on the left side.  Skin: Skin is warm and dry. She is not diaphoretic.   Psychiatric: She has a normal mood and affect. Her behavior is normal. Judgment and thought content normal.   Nursing note and vitals reviewed.      Assessment/Plan   Problems Addressed this Visit        Nervous and Auditory    Chronic midline low back pain with left-sided sciatica (Chronic)       Musculoskeletal and Integument    Rheumatoid arthritis (CMS/HCC) - Primary (Chronic)      Other Visit Diagnoses     Class 1 obesity due to excess calories with serious comorbidity and body mass index (BMI) of 33.0 to 33.9 in adult        Flu vaccine need        Relevant Orders    Fluzone  ([id number]) (Completed)          ME= 40  She is having more pain from rheumatoid arthritis.  She had gone off of her biologic due to concerns about side effects.         Visit Vitals  /72 (BP Location: Left arm, Patient Position: Sitting, Cuff Size: Adult)   Pulse 91   Ht 162.6 cm (64\")   Wt 88.1 kg (194 lb 5 oz)   SpO2 95%   BMI 33.35 kg/m²       Body mass index is 33.35 kg/m².            This document has been electronically signed by Mikal Trammell MD on November 19, 2019 3:11 PM      "

## 2019-11-12 NOTE — PATIENT INSTRUCTIONS

## 2019-12-10 ENCOUNTER — OFFICE VISIT (OUTPATIENT)
Dept: FAMILY MEDICINE CLINIC | Facility: CLINIC | Age: 59
End: 2019-12-10

## 2019-12-10 VITALS
HEART RATE: 74 BPM | BODY MASS INDEX: 33.28 KG/M2 | HEIGHT: 64 IN | OXYGEN SATURATION: 96 % | WEIGHT: 194.9 LBS | DIASTOLIC BLOOD PRESSURE: 80 MMHG | SYSTOLIC BLOOD PRESSURE: 126 MMHG

## 2019-12-10 DIAGNOSIS — M06.9 RHEUMATOID ARTHRITIS OF HAND, UNSPECIFIED LATERALITY, UNSPECIFIED RHEUMATOID FACTOR PRESENCE: ICD-10-CM

## 2019-12-10 DIAGNOSIS — G89.29 CHRONIC MIDLINE LOW BACK PAIN WITH LEFT-SIDED SCIATICA: Primary | ICD-10-CM

## 2019-12-10 DIAGNOSIS — K21.9 GASTROESOPHAGEAL REFLUX DISEASE WITHOUT ESOPHAGITIS: ICD-10-CM

## 2019-12-10 DIAGNOSIS — M54.42 CHRONIC MIDLINE LOW BACK PAIN WITH LEFT-SIDED SCIATICA: Primary | ICD-10-CM

## 2019-12-10 PROCEDURE — 99213 OFFICE O/P EST LOW 20 MIN: CPT | Performed by: FAMILY MEDICINE

## 2019-12-10 RX ORDER — HYDROCODONE BITARTRATE AND ACETAMINOPHEN 10; 325 MG/1; MG/1
1 TABLET ORAL EVERY 6 HOURS PRN
Qty: 120 TABLET | Refills: 0 | Status: SHIPPED | OUTPATIENT
Start: 2019-12-10 | End: 2020-01-07 | Stop reason: SDUPTHER

## 2019-12-10 RX ORDER — DEXLANSOPRAZOLE 60 MG/1
60 CAPSULE, DELAYED RELEASE ORAL DAILY
Qty: 90 CAPSULE | Refills: 2 | Status: SHIPPED | OUTPATIENT
Start: 2019-12-10 | End: 2020-01-09

## 2019-12-10 NOTE — PROGRESS NOTES
Subjective   Giselle Valdes is a 59 y.o. female.     Rheumatoid Arthritis   Associated symptoms include arthralgias and joint swelling. Pertinent negatives include no fever.   Back Pain   This is a chronic problem. The current episode started more than 1 year ago. The problem is unchanged. The pain is present in the lumbar spine. The quality of the pain is described as aching and shooting. The pain radiates to the left foot and right thigh. The pain is at a severity of 7/10. The pain is moderate. The pain is worse during the night. The symptoms are aggravated by standing. Stiffness is present in the morning. Pertinent negatives include no bladder incontinence, bowel incontinence or fever.   Arthritis   Presents for follow-up visit. She complains of pain, stiffness and joint swelling. Affected locations include the right wrist, left wrist, right MCP, left MCP, right PIP, left PIP, left DIP and right knee. Her pain is at a severity of 7/10. Pertinent negatives include no fever. Her past medical history is significant for rheumatoid arthritis.   Shaking   This is a new problem. The current episode started 1 to 4 weeks ago. The problem occurs constantly. The problem has been rapidly worsening. Associated symptoms include arthralgias and joint swelling. Pertinent negatives include no fever.   Heartburn   She complains of heartburn. She reports no dysphagia. This is a chronic problem. The problem occurs frequently. The problem has been gradually worsening.        The following portions of the patient's history were reviewed and updated as appropriate: allergies, current medications, past family history, past medical history, past social history, past surgical history and problem list.    Review of Systems   Constitutional: Negative for fever.   Gastrointestinal: Positive for heartburn. Negative for bowel incontinence and dysphagia.   Genitourinary: Negative for bladder incontinence.   Musculoskeletal: Positive for  "arthralgias, arthritis, back pain, joint swelling and stiffness.       Objective   Physical Exam   Constitutional: She is oriented to person, place, and time. She appears well-developed and well-nourished. No distress.   HENT:   Head: Normocephalic and atraumatic.   Musculoskeletal:        Lumbar back: She exhibits decreased range of motion, tenderness and pain. She exhibits no bony tenderness, no swelling, no edema, no deformity, no laceration and no spasm.        Right hand: She exhibits decreased range of motion and tenderness.        Left hand: She exhibits decreased range of motion and tenderness.   Neurological: She is alert and oriented to person, place, and time.   Reflex Scores:       Bicep reflexes are 2+ on the right side and 2+ on the left side.       Patellar reflexes are 2+ on the right side and 2+ on the left side.  Skin: Skin is warm and dry. She is not diaphoretic.   Psychiatric: She has a normal mood and affect. Her behavior is normal. Judgment and thought content normal.   Nursing note and vitals reviewed.      Assessment/Plan   Problems Addressed this Visit        Digestive    Gastroesophageal reflux disease (Chronic)    Relevant Medications    dexlansoprazole (DEXILANT) 60 MG capsule       Nervous and Auditory    Chronic midline low back pain with left-sided sciatica - Primary (Chronic)    Relevant Medications    HYDROcodone-acetaminophen (NORCO)  MG per tablet       Musculoskeletal and Integument    Rheumatoid arthritis (CMS/HCC) (Chronic)    Relevant Medications    HYDROcodone-acetaminophen (NORCO)  MG per tablet          ME= 38  She is having more problems with GERD.  She has no dysphasia.  She is not doing well with the over-the-counter Prilosec.  We will try to switch to Exelon if we can get her insurance to cover it.        Visit Vitals  /80   Pulse 74   Ht 162.6 cm (64\")   Wt 88.4 kg (194 lb 14.4 oz)   SpO2 96%   BMI 33.45 kg/m²       Body mass index is 33.45 " "kg/m².            This document has been electronically signed by Mikal Trammell MD on December 10, 2019 2:40 PM  .         Visit Vitals  /80   Pulse 74   Ht 162.6 cm (64\")   Wt 88.4 kg (194 lb 14.4 oz)   SpO2 96%   BMI 33.45 kg/m²       Body mass index is 33.45 kg/m².            This document has been electronically signed by Mikal Trammell MD on December 10, 2019 2:40 PM          Visit Vitals  /80   Pulse 74   Ht 162.6 cm (64\")   Wt 88.4 kg (194 lb 14.4 oz)   SpO2 96%   BMI 33.45 kg/m²       Body mass index is 33.45 kg/m².            This document has been electronically signed by Mikal Trammell MD on December 10, 2019 2:40 PM    "

## 2019-12-11 RX ORDER — CYCLOBENZAPRINE HCL 10 MG
TABLET ORAL
Qty: 270 TABLET | Refills: 4 | Status: SHIPPED | OUTPATIENT
Start: 2019-12-11 | End: 2021-03-17

## 2019-12-27 RX ORDER — TRAZODONE HYDROCHLORIDE 100 MG/1
TABLET ORAL
Qty: 90 TABLET | Refills: 4 | Status: SHIPPED | OUTPATIENT
Start: 2019-12-27 | End: 2021-03-17 | Stop reason: SDUPTHER

## 2019-12-27 RX ORDER — ATENOLOL 100 MG/1
TABLET ORAL
Qty: 90 TABLET | Refills: 4 | Status: SHIPPED | OUTPATIENT
Start: 2019-12-27 | End: 2021-03-17 | Stop reason: SDUPTHER

## 2019-12-27 RX ORDER — AMLODIPINE BESYLATE 10 MG/1
TABLET ORAL
Qty: 90 TABLET | Refills: 4 | Status: SHIPPED | OUTPATIENT
Start: 2019-12-27 | End: 2021-03-17 | Stop reason: SDUPTHER

## 2019-12-27 RX ORDER — LOSARTAN POTASSIUM 100 MG/1
TABLET ORAL
Qty: 90 TABLET | Refills: 4 | Status: SHIPPED | OUTPATIENT
Start: 2019-12-27 | End: 2021-03-17 | Stop reason: SDUPTHER

## 2020-01-07 ENCOUNTER — OFFICE VISIT (OUTPATIENT)
Dept: FAMILY MEDICINE CLINIC | Facility: CLINIC | Age: 60
End: 2020-01-07

## 2020-01-07 VITALS
SYSTOLIC BLOOD PRESSURE: 124 MMHG | DIASTOLIC BLOOD PRESSURE: 74 MMHG | WEIGHT: 194 LBS | HEART RATE: 76 BPM | BODY MASS INDEX: 33.12 KG/M2 | HEIGHT: 64 IN | OXYGEN SATURATION: 99 %

## 2020-01-07 DIAGNOSIS — M54.42 CHRONIC MIDLINE LOW BACK PAIN WITH LEFT-SIDED SCIATICA: ICD-10-CM

## 2020-01-07 DIAGNOSIS — G89.29 CHRONIC MIDLINE LOW BACK PAIN WITH LEFT-SIDED SCIATICA: ICD-10-CM

## 2020-01-07 DIAGNOSIS — M06.9 RHEUMATOID ARTHRITIS OF HAND, UNSPECIFIED LATERALITY, UNSPECIFIED RHEUMATOID FACTOR PRESENCE: Primary | ICD-10-CM

## 2020-01-07 PROCEDURE — 99213 OFFICE O/P EST LOW 20 MIN: CPT | Performed by: FAMILY MEDICINE

## 2020-01-07 RX ORDER — HYDROCODONE BITARTRATE AND ACETAMINOPHEN 10; 325 MG/1; MG/1
1 TABLET ORAL EVERY 6 HOURS PRN
Qty: 120 TABLET | Refills: 0 | Status: SHIPPED | OUTPATIENT
Start: 2020-01-07 | End: 2020-01-07 | Stop reason: SDUPTHER

## 2020-01-07 RX ORDER — HYDROCODONE BITARTRATE AND ACETAMINOPHEN 10; 325 MG/1; MG/1
1 TABLET ORAL EVERY 6 HOURS PRN
Qty: 120 TABLET | Refills: 0 | Status: SHIPPED | OUTPATIENT
Start: 2020-01-07 | End: 2020-02-06 | Stop reason: SDUPTHER

## 2020-01-07 NOTE — PROGRESS NOTES
Subjective   Giselle Valdes is a 59 y.o. female.     Rheumatoid Arthritis   Associated symptoms include arthralgias and joint swelling. Pertinent negatives include no fever.   Back Pain   This is a chronic problem. The current episode started more than 1 year ago. The problem is unchanged. The pain is present in the lumbar spine. The quality of the pain is described as aching and shooting. The pain radiates to the left foot and right thigh. The pain is at a severity of 7/10. The pain is moderate. The pain is worse during the night. The symptoms are aggravated by standing. Stiffness is present in the morning. Pertinent negatives include no bladder incontinence, bowel incontinence or fever.   Arthritis   Presents for follow-up visit. She complains of pain, stiffness and joint swelling. Affected locations include the right wrist, left wrist, right MCP, left MCP, right PIP, left PIP, left DIP and right knee. Her pain is at a severity of 8/10. Pertinent negatives include no fever. Her past medical history is significant for rheumatoid arthritis.        The following portions of the patient's history were reviewed and updated as appropriate: allergies, current medications, past family history, past medical history, past social history, past surgical history and problem list.    Review of Systems   Constitutional: Negative for fever.   Gastrointestinal: Negative for bowel incontinence.   Genitourinary: Negative for bladder incontinence.   Musculoskeletal: Positive for arthralgias, arthritis, back pain, joint swelling and stiffness.       Objective   Physical Exam   Constitutional: She is oriented to person, place, and time. She appears well-developed and well-nourished. No distress.   HENT:   Head: Normocephalic and atraumatic.   Musculoskeletal:        Lumbar back: She exhibits decreased range of motion, tenderness and pain. She exhibits no bony tenderness, no swelling, no edema, no deformity, no laceration and no  "spasm.        Right hand: She exhibits decreased range of motion and tenderness.        Left hand: She exhibits decreased range of motion and tenderness.   Neurological: She is alert and oriented to person, place, and time.   Reflex Scores:       Bicep reflexes are 2+ on the right side and 2+ on the left side.       Patellar reflexes are 2+ on the right side and 2+ on the left side.  Skin: Skin is warm and dry. She is not diaphoretic.   Psychiatric: She has a normal mood and affect. Her behavior is normal. Judgment and thought content normal.   Nursing note and vitals reviewed.      Assessment/Plan   Problems Addressed this Visit        Nervous and Auditory    Chronic midline low back pain with left-sided sciatica (Chronic)    Relevant Medications    HYDROcodone-acetaminophen (NORCO)  MG per tablet       Musculoskeletal and Integument    Rheumatoid arthritis (CMS/HCC) - Primary (Chronic)    Relevant Medications    HYDROcodone-acetaminophen (NORCO)  MG per tablet    Other Relevant Orders    Ambulatory Referral to Rheumatology (Completed)          ME= 38           Visit Vitals  /74   Pulse 76   Ht 162.6 cm (64\")   Wt 88 kg (194 lb)   SpO2 99%   BMI 33.30 kg/m²       Body mass index is 33.3 kg/m².            This document has been electronically signed by Mikal Trammell MD on January 7, 2020 1:32 PM          Visit Vitals  /74   Pulse 76   Ht 162.6 cm (64\")   Wt 88 kg (194 lb)   SpO2 99%   BMI 33.30 kg/m²       Body mass index is 33.3 kg/m².            This document has been electronically signed by Mikal Trammell MD on January 7, 2020 1:32 PM    "

## 2020-02-06 ENCOUNTER — OFFICE VISIT (OUTPATIENT)
Dept: FAMILY MEDICINE CLINIC | Facility: CLINIC | Age: 60
End: 2020-02-06

## 2020-02-06 VITALS
HEIGHT: 64 IN | SYSTOLIC BLOOD PRESSURE: 126 MMHG | HEART RATE: 76 BPM | BODY MASS INDEX: 33.12 KG/M2 | OXYGEN SATURATION: 95 % | WEIGHT: 194 LBS | DIASTOLIC BLOOD PRESSURE: 76 MMHG

## 2020-02-06 DIAGNOSIS — M54.42 CHRONIC MIDLINE LOW BACK PAIN WITH LEFT-SIDED SCIATICA: ICD-10-CM

## 2020-02-06 DIAGNOSIS — M06.9 RHEUMATOID ARTHRITIS OF HAND, UNSPECIFIED LATERALITY, UNSPECIFIED RHEUMATOID FACTOR PRESENCE: ICD-10-CM

## 2020-02-06 DIAGNOSIS — G89.29 CHRONIC MIDLINE LOW BACK PAIN WITH LEFT-SIDED SCIATICA: ICD-10-CM

## 2020-02-06 PROCEDURE — 99213 OFFICE O/P EST LOW 20 MIN: CPT | Performed by: FAMILY MEDICINE

## 2020-02-06 RX ORDER — HYDROCODONE BITARTRATE AND ACETAMINOPHEN 10; 325 MG/1; MG/1
1 TABLET ORAL EVERY 6 HOURS PRN
Qty: 120 TABLET | Refills: 0 | Status: SHIPPED | OUTPATIENT
Start: 2020-02-06 | End: 2020-03-05 | Stop reason: SDUPTHER

## 2020-02-06 NOTE — PROGRESS NOTES
Subjective   Giselle Valdes is a 59 y.o. female.     Back Pain   This is a chronic problem. The current episode started more than 1 year ago. The problem is unchanged. The pain is present in the lumbar spine. The quality of the pain is described as aching and shooting. The pain radiates to the left foot and right thigh. The pain is at a severity of 5/10. The pain is moderate. The pain is worse during the night. The symptoms are aggravated by standing. Stiffness is present in the morning. Pertinent negatives include no bladder incontinence, bowel incontinence or fever.   Arthritis   Presents for follow-up visit. She complains of pain, stiffness and joint swelling. Affected locations include the right wrist, left wrist, right MCP, left MCP, right PIP, left PIP, left DIP and right knee. Her pain is at a severity of 5/10. Pertinent negatives include no fever. Her past medical history is significant for rheumatoid arthritis.   Rheumatoid Arthritis   This is a chronic problem. The current episode started more than 1 year ago. The problem occurs constantly. Associated symptoms include arthralgias and joint swelling. Pertinent negatives include no fever.        The following portions of the patient's history were reviewed and updated as appropriate: allergies, current medications, past family history, past medical history, past social history, past surgical history and problem list.    Review of Systems   Constitutional: Negative for fever.   Gastrointestinal: Negative for bowel incontinence.   Genitourinary: Negative for bladder incontinence.   Musculoskeletal: Positive for arthralgias, arthritis, back pain, joint swelling and stiffness.       Objective   Physical Exam   Constitutional: She is oriented to person, place, and time. She appears well-developed and well-nourished. No distress.   HENT:   Head: Normocephalic and atraumatic.   Musculoskeletal:        Lumbar back: She exhibits decreased range of motion,  "tenderness and pain. She exhibits no bony tenderness, no swelling, no edema, no deformity, no laceration and no spasm.        Right hand: She exhibits decreased range of motion and tenderness.        Left hand: She exhibits decreased range of motion and tenderness.   Neurological: She is alert and oriented to person, place, and time.   Reflex Scores:       Bicep reflexes are 2+ on the right side and 2+ on the left side.       Patellar reflexes are 2+ on the right side and 2+ on the left side.  Skin: Skin is warm and dry. She is not diaphoretic.   Psychiatric: She has a normal mood and affect. Her behavior is normal. Judgment and thought content normal.   Nursing note and vitals reviewed.      Assessment/Plan   Problems Addressed this Visit        Nervous and Auditory    Chronic midline low back pain with left-sided sciatica (Chronic)    Relevant Medications    HYDROcodone-acetaminophen (NORCO)  MG per tablet       Musculoskeletal and Integument    Rheumatoid arthritis (CMS/HCC) (Chronic)    Relevant Medications    HYDROcodone-acetaminophen (NORCO)  MG per tablet          ME= 38           Visit Vitals  /76   Pulse 76   Ht 162.6 cm (64\")   Wt 88 kg (194 lb)   SpO2 95%   BMI 33.30 kg/m²       Body mass index is 33.3 kg/m².            This document has been electronically signed by Mikal Trammell MD on February 6, 2020 1:44 PM            "

## 2020-03-05 ENCOUNTER — OFFICE VISIT (OUTPATIENT)
Dept: FAMILY MEDICINE CLINIC | Facility: CLINIC | Age: 60
End: 2020-03-05

## 2020-03-05 VITALS
DIASTOLIC BLOOD PRESSURE: 80 MMHG | HEIGHT: 64 IN | WEIGHT: 195 LBS | HEART RATE: 79 BPM | OXYGEN SATURATION: 98 % | SYSTOLIC BLOOD PRESSURE: 136 MMHG | BODY MASS INDEX: 33.29 KG/M2

## 2020-03-05 DIAGNOSIS — N30.01 ACUTE CYSTITIS WITH HEMATURIA: Primary | ICD-10-CM

## 2020-03-05 DIAGNOSIS — M06.9 RHEUMATOID ARTHRITIS OF HAND, UNSPECIFIED LATERALITY, UNSPECIFIED RHEUMATOID FACTOR PRESENCE: ICD-10-CM

## 2020-03-05 DIAGNOSIS — G89.29 CHRONIC MIDLINE LOW BACK PAIN WITH LEFT-SIDED SCIATICA: ICD-10-CM

## 2020-03-05 DIAGNOSIS — M54.42 CHRONIC MIDLINE LOW BACK PAIN WITH LEFT-SIDED SCIATICA: ICD-10-CM

## 2020-03-05 LAB
BILIRUB BLD-MCNC: NEGATIVE MG/DL
CLARITY, POC: ABNORMAL
COLOR UR: YELLOW
GLUCOSE UR STRIP-MCNC: NEGATIVE MG/DL
KETONES UR QL: NEGATIVE
LEUKOCYTE EST, POC: ABNORMAL
NITRITE UR-MCNC: NEGATIVE MG/ML
PH UR: 5 [PH] (ref 5–8)
PROT UR STRIP-MCNC: ABNORMAL MG/DL
RBC # UR STRIP: ABNORMAL /UL
SP GR UR: 1.03 (ref 1–1.03)
UROBILINOGEN UR QL: NORMAL

## 2020-03-05 PROCEDURE — 81002 URINALYSIS NONAUTO W/O SCOPE: CPT | Performed by: FAMILY MEDICINE

## 2020-03-05 PROCEDURE — 99214 OFFICE O/P EST MOD 30 MIN: CPT | Performed by: FAMILY MEDICINE

## 2020-03-05 RX ORDER — HYDROCHLOROTHIAZIDE 12.5 MG/1
12.5 CAPSULE, GELATIN COATED ORAL DAILY PRN
Qty: 30 CAPSULE | Refills: 11 | Status: SHIPPED | OUTPATIENT
Start: 2020-03-05 | End: 2021-03-17 | Stop reason: SDUPTHER

## 2020-03-05 RX ORDER — SULFAMETHOXAZOLE AND TRIMETHOPRIM 800; 160 MG/1; MG/1
1 TABLET ORAL 2 TIMES DAILY
Qty: 14 TABLET | Refills: 0 | Status: SHIPPED | OUTPATIENT
Start: 2020-03-05 | End: 2020-03-12

## 2020-03-05 RX ORDER — HYDROCODONE BITARTRATE AND ACETAMINOPHEN 10; 325 MG/1; MG/1
1 TABLET ORAL EVERY 6 HOURS PRN
Qty: 120 TABLET | Refills: 0 | Status: SHIPPED | OUTPATIENT
Start: 2020-03-05 | End: 2020-04-07 | Stop reason: SDUPTHER

## 2020-03-05 NOTE — PROGRESS NOTES
Subjective   Giselle Valdes is a 59 y.o. female.     Arthritis   Presents for follow-up visit. She complains of pain, stiffness and joint swelling. Affected locations include the right wrist, left wrist, right MCP, left MCP, right PIP, left PIP, left DIP and right knee. Her pain is at a severity of 5/10. Pertinent negatives include no fever. Her past medical history is significant for rheumatoid arthritis.   Back Pain   This is a chronic problem. The current episode started more than 1 year ago. The problem is unchanged. The pain is present in the lumbar spine. The quality of the pain is described as aching and shooting. The pain radiates to the left foot and right thigh. The pain is at a severity of 7/10. The pain is moderate. The pain is worse during the night. The symptoms are aggravated by standing. Stiffness is present in the morning. Pertinent negatives include no bladder incontinence, bowel incontinence or fever.   Rheumatoid Arthritis   This is a chronic problem. The current episode started more than 1 year ago. The problem occurs constantly. Associated symptoms include arthralgias and joint swelling. Pertinent negatives include no fever.   Urinary Tract Infection    This is a new problem. The current episode started in the past 7 days. The problem occurs every urination. The problem has been unchanged. The quality of the pain is described as burning. The pain is moderate. There has been no fever. Pertinent negatives include no hematuria.        The following portions of the patient's history were reviewed and updated as appropriate: allergies, current medications, past family history, past medical history, past social history, past surgical history and problem list.    Review of Systems   Constitutional: Negative for fever.   Gastrointestinal: Negative for bowel incontinence.   Genitourinary: Negative for bladder incontinence and hematuria.   Musculoskeletal: Positive for arthralgias, arthritis, back  "pain, joint swelling and stiffness.       Objective   Physical Exam   Constitutional: She is oriented to person, place, and time. She appears well-developed and well-nourished. No distress.   HENT:   Head: Normocephalic and atraumatic.   Abdominal: There is tenderness. There is no CVA tenderness.   Musculoskeletal:        Lumbar back: She exhibits decreased range of motion, tenderness and pain. She exhibits no bony tenderness, no swelling, no edema, no deformity, no laceration and no spasm.        Right hand: She exhibits decreased range of motion and tenderness.        Left hand: She exhibits decreased range of motion and tenderness.   Neurological: She is alert and oriented to person, place, and time.   Reflex Scores:       Bicep reflexes are 2+ on the right side and 2+ on the left side.       Patellar reflexes are 2+ on the right side and 2+ on the left side.  Skin: Skin is warm and dry. She is not diaphoretic.   Psychiatric: She has a normal mood and affect. Her behavior is normal. Judgment and thought content normal.   Nursing note and vitals reviewed.      Assessment/Plan   Problems Addressed this Visit        Nervous and Auditory    Chronic midline low back pain with left-sided sciatica (Chronic)    Relevant Medications    HYDROcodone-acetaminophen (NORCO)  MG per tablet       Musculoskeletal and Integument    Rheumatoid arthritis (CMS/HCC) (Chronic)    Relevant Medications    HYDROcodone-acetaminophen (NORCO)  MG per tablet      Other Visit Diagnoses     Acute cystitis with hematuria    -  Primary          ME= 38           Visit Vitals  /80   Pulse 79   Ht 162.6 cm (64\")   Wt 88.5 kg (195 lb)   SpO2 98%   BMI 33.47 kg/m²       Body mass index is 33.47 kg/m².            This document has been electronically signed by Mikal Trammell MD on March 5, 2020 4:29 PM            "

## 2020-03-16 ENCOUNTER — TELEPHONE (OUTPATIENT)
Dept: FAMILY MEDICINE CLINIC | Facility: CLINIC | Age: 60
End: 2020-03-16

## 2020-03-16 RX ORDER — DULOXETIN HYDROCHLORIDE 60 MG/1
120 CAPSULE, DELAYED RELEASE ORAL DAILY
Qty: 60 CAPSULE | Refills: 5 | Status: SHIPPED | OUTPATIENT
Start: 2020-03-16 | End: 2021-03-17 | Stop reason: SDUPTHER

## 2020-04-07 ENCOUNTER — TELEMEDICINE (OUTPATIENT)
Dept: FAMILY MEDICINE CLINIC | Facility: CLINIC | Age: 60
End: 2020-04-07

## 2020-04-07 VITALS — BODY MASS INDEX: 33.2 KG/M2 | WEIGHT: 194.5 LBS | HEIGHT: 64 IN

## 2020-04-07 DIAGNOSIS — M54.42 CHRONIC MIDLINE LOW BACK PAIN WITH LEFT-SIDED SCIATICA: ICD-10-CM

## 2020-04-07 DIAGNOSIS — M06.9 RHEUMATOID ARTHRITIS OF HAND, UNSPECIFIED LATERALITY, UNSPECIFIED RHEUMATOID FACTOR PRESENCE: ICD-10-CM

## 2020-04-07 DIAGNOSIS — G89.29 CHRONIC MIDLINE LOW BACK PAIN WITH LEFT-SIDED SCIATICA: ICD-10-CM

## 2020-04-07 PROCEDURE — 99213 OFFICE O/P EST LOW 20 MIN: CPT | Performed by: FAMILY MEDICINE

## 2020-04-07 RX ORDER — HYDROCODONE BITARTRATE AND ACETAMINOPHEN 10; 325 MG/1; MG/1
1 TABLET ORAL EVERY 6 HOURS PRN
Qty: 120 TABLET | Refills: 0 | Status: SHIPPED | OUTPATIENT
Start: 2020-04-07 | End: 2020-05-05 | Stop reason: SDUPTHER

## 2020-04-07 NOTE — PROGRESS NOTES
Subjective   Giselle Valdes is a 59 y.o. female.     Arthritis   Presents for follow-up visit. She complains of pain, stiffness and joint swelling. Affected locations include the right wrist, left wrist, right MCP, left MCP, right PIP, left PIP, left DIP and right knee. Her pain is at a severity of 5/10. Pertinent negatives include no fever. Her past medical history is significant for rheumatoid arthritis.   Back Pain   This is a chronic problem. The current episode started more than 1 year ago. The problem is unchanged. The pain is present in the lumbar spine. The quality of the pain is described as aching and shooting. The pain radiates to the left foot and right thigh. The pain is at a severity of 6/10. The pain is moderate. The pain is worse during the night. The symptoms are aggravated by standing. Stiffness is present in the morning. Pertinent negatives include no bladder incontinence, bowel incontinence or fever.   Rheumatoid Arthritis   This is a chronic problem. The current episode started more than 1 year ago. The problem occurs constantly. Associated symptoms include arthralgias and joint swelling. Pertinent negatives include no fever.        The following portions of the patient's history were reviewed and updated as appropriate: allergies, current medications, past family history, past medical history, past social history, past surgical history and problem list.    Review of Systems   Constitutional: Negative for fever.   Gastrointestinal: Negative for bowel incontinence.   Genitourinary: Negative for bladder incontinence.   Musculoskeletal: Positive for arthralgias, arthritis, back pain, joint swelling and stiffness.       Objective   Physical Exam   Constitutional: She is oriented to person, place, and time.   Neurological: She is alert and oriented to person, place, and time.   Psychiatric: She has a normal mood and affect. Her behavior is normal. Judgment and thought content normal.   Nursing  "note reviewed.      Assessment/Plan   Problems Addressed this Visit        Nervous and Auditory    Chronic midline low back pain with left-sided sciatica (Chronic)    Relevant Medications    HYDROcodone-acetaminophen (NORCO)  MG per tablet       Musculoskeletal and Integument    Rheumatoid arthritis (CMS/HCC) (Chronic)    Relevant Medications    HYDROcodone-acetaminophen (NORCO)  MG per tablet          ME= 38       Unable to complete visit using a video connection to the patient. A phone visit was used to complete this visits. Total time of discussion was 14 minutes.      Visit Vitals  Ht 162.6 cm (64\")   Wt 88.2 kg (194 lb 8 oz)   BMI 33.39 kg/m²       Body mass index is 33.39 kg/m².            This document has been electronically signed by Mikal Trammell MD on April 7, 2020 09:53            "

## 2020-05-05 ENCOUNTER — TELEMEDICINE (OUTPATIENT)
Dept: FAMILY MEDICINE CLINIC | Facility: CLINIC | Age: 60
End: 2020-05-05

## 2020-05-05 VITALS — HEIGHT: 64 IN | WEIGHT: 194 LBS | BODY MASS INDEX: 33.12 KG/M2

## 2020-05-05 DIAGNOSIS — M06.9 RHEUMATOID ARTHRITIS OF HAND, UNSPECIFIED LATERALITY, UNSPECIFIED RHEUMATOID FACTOR PRESENCE: ICD-10-CM

## 2020-05-05 DIAGNOSIS — M54.42 CHRONIC MIDLINE LOW BACK PAIN WITH LEFT-SIDED SCIATICA: ICD-10-CM

## 2020-05-05 DIAGNOSIS — G89.29 CHRONIC MIDLINE LOW BACK PAIN WITH LEFT-SIDED SCIATICA: ICD-10-CM

## 2020-05-05 PROCEDURE — 99213 OFFICE O/P EST LOW 20 MIN: CPT | Performed by: FAMILY MEDICINE

## 2020-05-05 RX ORDER — HYDROCODONE BITARTRATE AND ACETAMINOPHEN 10; 325 MG/1; MG/1
1 TABLET ORAL EVERY 6 HOURS PRN
Qty: 120 TABLET | Refills: 0 | Status: SHIPPED | OUTPATIENT
Start: 2020-05-05 | End: 2020-06-03 | Stop reason: SDUPTHER

## 2020-05-05 NOTE — PROGRESS NOTES
Subjective   Giselle Valdes is a 59 y.o. female.     Arthritis   Presents for follow-up visit. She complains of pain, stiffness and joint swelling. Affected locations include the right wrist, left wrist, right MCP, left MCP, right PIP, left PIP and left DIP. Her pain is at a severity of 7/10. Pertinent negatives include no fever. Her past medical history is significant for rheumatoid arthritis.   Back Pain   This is a chronic problem. The current episode started more than 1 year ago. The problem is unchanged. The pain is present in the lumbar spine. The quality of the pain is described as aching and shooting. The pain radiates to the left knee. The pain is at a severity of 6/10. The pain is moderate. The pain is worse during the night. The symptoms are aggravated by standing. Stiffness is present in the morning. Pertinent negatives include no bladder incontinence, bowel incontinence or fever.   Rheumatoid Arthritis   This is a chronic problem. The current episode started more than 1 year ago. The problem occurs constantly. Associated symptoms include arthralgias and joint swelling. Pertinent negatives include no fever.        The following portions of the patient's history were reviewed and updated as appropriate: allergies, current medications, past family history, past medical history, past social history, past surgical history and problem list.    Review of Systems   Constitutional: Negative for fever.   Gastrointestinal: Negative for bowel incontinence.   Genitourinary: Negative for bladder incontinence.   Musculoskeletal: Positive for arthralgias, arthritis, back pain, joint swelling and stiffness.       Objective   Physical Exam   Constitutional: She is oriented to person, place, and time.   Neurological: She is alert and oriented to person, place, and time.   Psychiatric: She has a normal mood and affect. Her behavior is normal. Judgment and thought content normal.   Nursing note  reviewed.      Assessment/Plan   Problems Addressed this Visit        Nervous and Auditory    Chronic midline low back pain with left-sided sciatica (Chronic)    Relevant Medications    HYDROcodone-acetaminophen (NORCO)  MG per tablet       Musculoskeletal and Integument    Rheumatoid arthritis (CMS/HCC) (Chronic)    Relevant Medications    HYDROcodone-acetaminophen (NORCO)  MG per tablet          ME= 38       Unable to complete visit using a video connection to the patient. A phone visit was used to complete this visits. Total time of discussion was 12 minutes.

## 2020-06-03 ENCOUNTER — OFFICE VISIT (OUTPATIENT)
Dept: FAMILY MEDICINE CLINIC | Facility: CLINIC | Age: 60
End: 2020-06-03

## 2020-06-03 VITALS — BODY MASS INDEX: 33.3 KG/M2 | HEIGHT: 64 IN

## 2020-06-03 DIAGNOSIS — M06.9 RHEUMATOID ARTHRITIS OF HAND, UNSPECIFIED LATERALITY, UNSPECIFIED RHEUMATOID FACTOR PRESENCE: ICD-10-CM

## 2020-06-03 DIAGNOSIS — G89.29 CHRONIC MIDLINE LOW BACK PAIN WITH LEFT-SIDED SCIATICA: ICD-10-CM

## 2020-06-03 DIAGNOSIS — M54.42 CHRONIC MIDLINE LOW BACK PAIN WITH LEFT-SIDED SCIATICA: ICD-10-CM

## 2020-06-03 PROCEDURE — 99442 PR PHYS/QHP TELEPHONE EVALUATION 11-20 MIN: CPT | Performed by: FAMILY MEDICINE

## 2020-06-03 RX ORDER — HYDROCODONE BITARTRATE AND ACETAMINOPHEN 10; 325 MG/1; MG/1
1 TABLET ORAL EVERY 6 HOURS PRN
Qty: 120 TABLET | Refills: 0 | Status: SHIPPED | OUTPATIENT
Start: 2020-06-03 | End: 2020-07-02 | Stop reason: SDUPTHER

## 2020-06-03 NOTE — PROGRESS NOTES
Subjective   Giselle Valdes is a 59 y.o. female.     Arthritis   Presents for follow-up visit. She complains of pain, stiffness and joint swelling. Affected locations include the right wrist, left wrist, right MCP, left MCP, right PIP, left PIP and left DIP. Her pain is at a severity of 7/10. Pertinent negatives include no fever. Her past medical history is significant for rheumatoid arthritis.   Back Pain   This is a chronic problem. The current episode started more than 1 year ago. The problem is unchanged. The pain is present in the lumbar spine. The quality of the pain is described as aching and shooting. The pain radiates to the left knee. The pain is at a severity of 6/10. The pain is moderate. The pain is worse during the night. The symptoms are aggravated by standing. Stiffness is present in the morning. Pertinent negatives include no bladder incontinence, bowel incontinence or fever.   Rheumatoid Arthritis   This is a chronic problem. The current episode started more than 1 year ago. The problem occurs constantly. Associated symptoms include arthralgias and joint swelling. Pertinent negatives include no fever.        The following portions of the patient's history were reviewed and updated as appropriate: allergies, current medications, past family history, past medical history, past social history, past surgical history and problem list.    Review of Systems   Constitutional: Negative for fever.   Gastrointestinal: Negative for bowel incontinence.   Genitourinary: Negative for bladder incontinence.   Musculoskeletal: Positive for arthralgias, arthritis, back pain, joint swelling and stiffness.       Objective   Physical Exam   Constitutional: She is oriented to person, place, and time. No distress.   Neurological: She is alert and oriented to person, place, and time.   Psychiatric: She has a normal mood and affect. Her behavior is normal. Judgment and thought content normal.   Nursing note  reviewed.      Assessment/Plan   Problems Addressed this Visit        Nervous and Auditory    Chronic midline low back pain with left-sided sciatica (Chronic)    Relevant Medications    HYDROcodone-acetaminophen (NORCO)  MG per tablet       Musculoskeletal and Integument    Rheumatoid arthritis (CMS/HCC) (Chronic)    Relevant Medications    HYDROcodone-acetaminophen (NORCO)  MG per tablet          ME= 38       You have chosen to receive care through a telephone visit. Do you consent to use a telephone visit for your medical care today? Yes  This visit has been rescheduled as a phone visit to comply with patient safety concerns in accordance with CDC recommendations. Total time of discussion was 11 minutes.

## 2020-07-02 ENCOUNTER — OFFICE VISIT (OUTPATIENT)
Dept: FAMILY MEDICINE CLINIC | Facility: CLINIC | Age: 60
End: 2020-07-02

## 2020-07-02 DIAGNOSIS — M54.42 CHRONIC MIDLINE LOW BACK PAIN WITH LEFT-SIDED SCIATICA: ICD-10-CM

## 2020-07-02 DIAGNOSIS — G89.29 CHRONIC MIDLINE LOW BACK PAIN WITH LEFT-SIDED SCIATICA: ICD-10-CM

## 2020-07-02 DIAGNOSIS — M06.9 RHEUMATOID ARTHRITIS OF HAND, UNSPECIFIED LATERALITY, UNSPECIFIED RHEUMATOID FACTOR PRESENCE: ICD-10-CM

## 2020-07-02 PROCEDURE — 99213 OFFICE O/P EST LOW 20 MIN: CPT | Performed by: FAMILY MEDICINE

## 2020-07-02 RX ORDER — HYDROCODONE BITARTRATE AND ACETAMINOPHEN 10; 325 MG/1; MG/1
1 TABLET ORAL EVERY 6 HOURS PRN
Qty: 120 TABLET | Refills: 0 | Status: SHIPPED | OUTPATIENT
Start: 2020-07-02 | End: 2020-07-30 | Stop reason: SDUPTHER

## 2020-07-02 NOTE — PROGRESS NOTES
Subjective   Giselle Valdes is a 59 y.o. female.     Back Pain   This is a chronic problem. The current episode started more than 1 year ago. The problem is unchanged. The pain is present in the lumbar spine. The quality of the pain is described as aching and shooting. The pain radiates to the left knee. The pain is at a severity of 7/10. The pain is moderate. The pain is worse during the night. The symptoms are aggravated by standing. Stiffness is present in the morning. Pertinent negatives include no bladder incontinence, bowel incontinence or fever.   Arthritis   Presents for follow-up visit. She complains of pain, stiffness and joint swelling. Affected locations include the right wrist, left wrist, right MCP, left MCP, right PIP, left PIP and left DIP. Her pain is at a severity of 6/10. Pertinent negatives include no fever. Her past medical history is significant for rheumatoid arthritis.   Rheumatoid Arthritis   This is a chronic problem. The current episode started more than 1 year ago. The problem occurs constantly. Associated symptoms include arthralgias and joint swelling. Pertinent negatives include no fever.        The following portions of the patient's history were reviewed and updated as appropriate: allergies, current medications, past family history, past medical history, past social history, past surgical history and problem list.    Review of Systems   Constitutional: Negative for fever.   Gastrointestinal: Negative for bowel incontinence.   Genitourinary: Negative for bladder incontinence.   Musculoskeletal: Positive for arthralgias, arthritis, back pain, joint swelling and stiffness.       Objective   Physical Exam   Constitutional: She is oriented to person, place, and time. No distress.   Neurological: She is alert and oriented to person, place, and time.   Psychiatric: She has a normal mood and affect. Her behavior is normal. Judgment and thought content normal.   Nursing note  reviewed.      Assessment/Plan   Problems Addressed this Visit        Nervous and Auditory    Chronic midline low back pain with left-sided sciatica (Chronic)    Relevant Medications    HYDROcodone-acetaminophen (NORCO)  MG per tablet       Musculoskeletal and Integument    Rheumatoid arthritis (CMS/HCC) (Chronic)    Relevant Medications    HYDROcodone-acetaminophen (NORCO)  MG per tablet          ME= 40       You have chosen to receive care through a telephone visit. Do you consent to use a telephone visit for your medical care today? Yes  This visit has been rescheduled as a phone visit to comply with patient safety concerns in accordance with CDC recommendations. Total time of discussion was 9 minutes.

## 2020-07-30 ENCOUNTER — OFFICE VISIT (OUTPATIENT)
Dept: FAMILY MEDICINE CLINIC | Facility: CLINIC | Age: 60
End: 2020-07-30

## 2020-07-30 VITALS — WEIGHT: 194 LBS | BODY MASS INDEX: 33.3 KG/M2

## 2020-07-30 DIAGNOSIS — I10 ESSENTIAL HYPERTENSION: Primary | ICD-10-CM

## 2020-07-30 DIAGNOSIS — M06.9 RHEUMATOID ARTHRITIS OF HAND, UNSPECIFIED LATERALITY, UNSPECIFIED RHEUMATOID FACTOR PRESENCE: ICD-10-CM

## 2020-07-30 DIAGNOSIS — G89.29 CHRONIC MIDLINE LOW BACK PAIN WITH LEFT-SIDED SCIATICA: ICD-10-CM

## 2020-07-30 DIAGNOSIS — K21.9 GASTROESOPHAGEAL REFLUX DISEASE WITHOUT ESOPHAGITIS: ICD-10-CM

## 2020-07-30 DIAGNOSIS — M54.42 CHRONIC MIDLINE LOW BACK PAIN WITH LEFT-SIDED SCIATICA: ICD-10-CM

## 2020-07-30 DIAGNOSIS — F32.A DEPRESSIVE DISORDER: ICD-10-CM

## 2020-07-30 PROCEDURE — 99442 PR PHYS/QHP TELEPHONE EVALUATION 11-20 MIN: CPT | Performed by: FAMILY MEDICINE

## 2020-07-30 RX ORDER — HYDROCODONE BITARTRATE AND ACETAMINOPHEN 10; 325 MG/1; MG/1
1 TABLET ORAL EVERY 6 HOURS PRN
Qty: 120 TABLET | Refills: 0 | Status: SHIPPED | OUTPATIENT
Start: 2020-07-30 | End: 2020-08-27 | Stop reason: SDUPTHER

## 2020-07-30 RX ORDER — OMEPRAZOLE 20 MG/1
20 CAPSULE, DELAYED RELEASE ORAL DAILY
COMMUNITY

## 2020-07-30 NOTE — PROGRESS NOTES
Subjective   Giselle Valdes is a 60 y.o. female.     Back Pain   This is a chronic problem. The current episode started more than 1 year ago. The problem is unchanged. The pain is present in the lumbar spine. The quality of the pain is described as aching and shooting. The pain radiates to the left foot and right thigh. The pain is at a severity of 7/10. The pain is moderate. The pain is worse during the night. The symptoms are aggravated by standing. Stiffness is present in the morning. Pertinent negatives include no bladder incontinence, bowel incontinence, chest pain or fever.   Arthritis   Presents for follow-up visit. Affected locations include the neck, left foot, right foot, left elbow, right MCP, right elbow and left MCP. Pertinent negatives include no fever.   Hypertension   This is a chronic problem. The current episode started more than 1 year ago. The problem is unchanged. The problem is controlled. Associated symptoms include peripheral edema. Pertinent negatives include no chest pain, orthopnea, palpitations, PND or shortness of breath.   Heartburn   She complains of heartburn. She reports no chest pain. This is a chronic problem. The current episode started more than 1 year ago. The problem occurs frequently. The problem has been unchanged.   Depression   Visit Type: follow-up  Patient is not experiencing: depressed mood, insomnia, irritability, nervousness/anxiety, palpitations and shortness of breath.         The following portions of the patient's history were reviewed and updated as appropriate: allergies, current medications, past family history, past medical history, past social history, past surgical history and problem list.    Review of Systems   Constitutional: Negative for fever and irritability.   Respiratory: Negative for shortness of breath.    Cardiovascular: Negative for chest pain, palpitations, orthopnea and PND.   Gastrointestinal: Positive for heartburn. Negative for bowel  incontinence.   Genitourinary: Negative for bladder incontinence.   Musculoskeletal: Positive for arthralgias, arthritis and back pain.   Psychiatric/Behavioral: The patient is not nervous/anxious and does not have insomnia.        Objective   Physical Exam   Constitutional: She is oriented to person, place, and time. No distress.   Neurological: She is alert and oriented to person, place, and time.   Psychiatric: She has a normal mood and affect. Her behavior is normal. Judgment and thought content normal.       Assessment/Plan   Problems Addressed this Visit        Cardiovascular and Mediastinum    Essential hypertension - Primary (Chronic)       Digestive    Gastroesophageal reflux disease (Chronic)    Relevant Medications    omeprazole (priLOSEC) 20 MG capsule       Nervous and Auditory    Chronic midline low back pain with left-sided sciatica (Chronic)    Relevant Medications    HYDROcodone-acetaminophen (NORCO)  MG per tablet       Musculoskeletal and Integument    Rheumatoid arthritis (CMS/HCC) (Chronic)    Relevant Medications    HYDROcodone-acetaminophen (NORCO)  MG per tablet       Other    Depressive disorder          ME= 38       You have chosen to receive care through a telephone visit. Do you consent to use a telephone visit for your medical care today? Yes  This visit has been rescheduled as a phone visit to comply with patient safety concerns in accordance with CDC recommendations. Total time of discussion was 18 minutes.          Visit Vitals  Wt 88 kg (194 lb)   BMI 33.30 kg/m²       Body mass index is 33.3 kg/m².            This document has been electronically signed by Mikal Trammell MD on July 30, 2020 14:15

## 2020-08-24 ENCOUNTER — TELEPHONE (OUTPATIENT)
Dept: FAMILY MEDICINE CLINIC | Facility: CLINIC | Age: 60
End: 2020-08-24

## 2020-08-24 DIAGNOSIS — M06.9 RHEUMATOID ARTHRITIS OF HAND, UNSPECIFIED LATERALITY, UNSPECIFIED RHEUMATOID FACTOR PRESENCE: Primary | ICD-10-CM

## 2020-08-24 NOTE — TELEPHONE ENCOUNTER
Giselle had a referral in the past for Rheumatology, but she has found a new one in her network Dr Ada Hdz.  Their main office is at [place] but she comes to [facility] office and that is where she wants to be seen.  Has it been too long that she needs appt?     [phone]

## 2020-08-24 NOTE — TELEPHONE ENCOUNTER
Requested Refills Sent to Dr Ada Hdz at her [facility] office    Mikal Trammell MD  You 1 hour ago (3:21 PM)      Please refer to Dr. Ada Hdz at in [facility] office for rheumatoid arthritis

## 2020-08-27 ENCOUNTER — OFFICE VISIT (OUTPATIENT)
Dept: FAMILY MEDICINE CLINIC | Facility: CLINIC | Age: 60
End: 2020-08-27

## 2020-08-27 DIAGNOSIS — M06.9 RHEUMATOID ARTHRITIS OF HAND, UNSPECIFIED LATERALITY, UNSPECIFIED RHEUMATOID FACTOR PRESENCE: ICD-10-CM

## 2020-08-27 DIAGNOSIS — G89.29 CHRONIC MIDLINE LOW BACK PAIN WITH LEFT-SIDED SCIATICA: ICD-10-CM

## 2020-08-27 DIAGNOSIS — M54.42 CHRONIC MIDLINE LOW BACK PAIN WITH LEFT-SIDED SCIATICA: ICD-10-CM

## 2020-08-27 PROCEDURE — 99213 OFFICE O/P EST LOW 20 MIN: CPT | Performed by: FAMILY MEDICINE

## 2020-08-27 RX ORDER — HYDROCODONE BITARTRATE AND ACETAMINOPHEN 10; 325 MG/1; MG/1
1 TABLET ORAL EVERY 6 HOURS PRN
Qty: 120 TABLET | Refills: 0 | Status: SHIPPED | OUTPATIENT
Start: 2020-08-27 | End: 2020-09-25 | Stop reason: SDUPTHER

## 2020-08-27 NOTE — PROGRESS NOTES
Subjective   Giselle Valdes is a 60 y.o. female.     Back Pain   This is a chronic problem. The current episode started more than 1 year ago. The problem is unchanged. The pain is present in the lumbar spine. The quality of the pain is described as aching and shooting. The pain radiates to the left knee. The pain is at a severity of 7/10. The pain is moderate. The pain is worse during the night. The symptoms are aggravated by standing. Stiffness is present in the morning. Pertinent negatives include no bladder incontinence, bowel incontinence or fever.   Arthritis   Presents for follow-up visit. She complains of pain, stiffness and joint swelling. Affected locations include the right wrist, left wrist, right MCP, left MCP, right PIP, left PIP, left DIP and left foot. Her pain is at a severity of 7/10. Pertinent negatives include no fever. Her past medical history is significant for rheumatoid arthritis.   Rheumatoid Arthritis   This is a chronic problem. The current episode started more than 1 year ago. The problem occurs constantly. Associated symptoms include arthralgias and joint swelling. Pertinent negatives include no fever.        The following portions of the patient's history were reviewed and updated as appropriate: allergies, current medications, past family history, past medical history, past social history, past surgical history and problem list.    Review of Systems   Constitutional: Negative for fever.   Gastrointestinal: Negative for bowel incontinence.   Genitourinary: Negative for bladder incontinence.   Musculoskeletal: Positive for arthralgias, arthritis, back pain, joint swelling and stiffness.       Objective   Physical Exam   Constitutional: She is oriented to person, place, and time. No distress.   Neurological: She is alert and oriented to person, place, and time.   Psychiatric: She has a normal mood and affect. Her behavior is normal. Judgment and thought content normal.   Nursing note  reviewed.      Assessment/Plan   Problems Addressed this Visit        Nervous and Auditory    Chronic midline low back pain with left-sided sciatica (Chronic)    Relevant Medications    HYDROcodone-acetaminophen (NORCO)  MG per tablet       Musculoskeletal and Integument    Rheumatoid arthritis (CMS/HCC) (Chronic)    Relevant Medications    HYDROcodone-acetaminophen (NORCO)  MG per tablet          ME= 38       You have chosen to receive care through a telephone visit. Do you consent to use a telephone visit for your medical care today? Yes  This visit has been rescheduled as a phone visit to comply with patient safety concerns in accordance with CDC recommendations. Total time of discussion was 12 minutes.

## 2020-09-25 ENCOUNTER — OFFICE VISIT (OUTPATIENT)
Dept: FAMILY MEDICINE CLINIC | Facility: CLINIC | Age: 60
End: 2020-09-25

## 2020-09-25 DIAGNOSIS — M06.9 RHEUMATOID ARTHRITIS OF HAND, UNSPECIFIED LATERALITY, UNSPECIFIED RHEUMATOID FACTOR PRESENCE: Primary | ICD-10-CM

## 2020-09-25 DIAGNOSIS — G89.29 CHRONIC MIDLINE LOW BACK PAIN WITH LEFT-SIDED SCIATICA: ICD-10-CM

## 2020-09-25 DIAGNOSIS — M54.42 CHRONIC MIDLINE LOW BACK PAIN WITH LEFT-SIDED SCIATICA: ICD-10-CM

## 2020-09-25 PROCEDURE — 99442 PR PHYS/QHP TELEPHONE EVALUATION 11-20 MIN: CPT | Performed by: FAMILY MEDICINE

## 2020-09-25 RX ORDER — PREDNISONE 10 MG/1
10 TABLET ORAL SEE ADMIN INSTRUCTIONS
Qty: 40 TABLET | Refills: 11 | Status: SHIPPED | OUTPATIENT
Start: 2020-09-25 | End: 2020-10-23

## 2020-09-25 RX ORDER — HYDROCODONE BITARTRATE AND ACETAMINOPHEN 10; 325 MG/1; MG/1
1 TABLET ORAL EVERY 6 HOURS PRN
Qty: 120 TABLET | Refills: 0 | Status: SHIPPED | OUTPATIENT
Start: 2020-09-25 | End: 2020-10-23 | Stop reason: SDUPTHER

## 2020-09-25 NOTE — PROGRESS NOTES
Subjective   Giselle Valdes is a 60 y.o. female.     Back Pain  This is a chronic problem. The current episode started more than 1 year ago. The problem is unchanged. The pain is present in the lumbar spine. The quality of the pain is described as aching and shooting. The pain radiates to the left knee. The pain is at a severity of 9/10. The pain is moderate. The pain is worse during the night. The symptoms are aggravated by standing. Stiffness is present in the morning. Pertinent negatives include no bladder incontinence, bowel incontinence or fever.   Arthritis  Presents for follow-up visit. She complains of pain, stiffness and joint swelling. Affected locations include the right wrist, left wrist, right MCP, left MCP, right PIP, left PIP, left DIP and left foot. Her pain is at a severity of 9/10. Pertinent negatives include no fever. Her past medical history is significant for rheumatoid arthritis.   Rheumatoid Arthritis  This is a chronic problem. The current episode started more than 1 year ago. The problem occurs constantly. Associated symptoms include arthralgias and joint swelling. Pertinent negatives include no fever.        The following portions of the patient's history were reviewed and updated as appropriate: allergies, current medications, past family history, past medical history, past social history, past surgical history and problem list.    Review of Systems   Constitutional: Negative for fever.   Gastrointestinal: Negative for bowel incontinence.   Genitourinary: Negative for bladder incontinence.   Musculoskeletal: Positive for arthralgias, arthritis, back pain, joint swelling and stiffness.       Objective   Physical Exam   Constitutional: She is oriented to person, place, and time. No distress.   Neurological: She is alert and oriented to person, place, and time.   Psychiatric: Her behavior is normal. Judgment and thought content normal.   Nursing note reviewed.      Assessment/Plan    Problems Addressed this Visit        Nervous and Auditory    Chronic midline low back pain with left-sided sciatica (Chronic)    Relevant Medications    HYDROcodone-acetaminophen (NORCO)  MG per tablet       Musculoskeletal and Integument    Rheumatoid arthritis (CMS/[facility]) - Primary (Chronic)    Relevant Medications    predniSONE (DELTASONE) 10 MG tablet    HYDROcodone-acetaminophen (NORCO)  MG per tablet    Other Relevant Orders    Ambulatory Referral to Rheumatology (Completed)      Diagnoses       Codes Comments    Rheumatoid arthritis of hand, unspecified laterality, unspecified rheumatoid factor presence (CMS/[facility])    -  Primary ICD-10-CM: M06.9  ICD-9-CM: 714.0     Chronic midline low back pain with left-sided sciatica     ICD-10-CM: M54.42, G89.29  ICD-9-CM: 724.2, 724.3, 338.29       new nodule on both feet, still not been able to get Rheumatology consult. Will readress and start steroids and taper as indicated    ME= 38       You have chosen to receive care through a telephone visit. Do you consent to use a telephone visit for your medical care today? Yes  This visit has been rescheduled as a phone visit to comply with patient safety concerns in accordance with CDC recommendations. Total time of discussion was 14 minutes.

## 2020-10-23 ENCOUNTER — OFFICE VISIT (OUTPATIENT)
Dept: FAMILY MEDICINE CLINIC | Facility: CLINIC | Age: 60
End: 2020-10-23

## 2020-10-23 DIAGNOSIS — K21.9 GASTROESOPHAGEAL REFLUX DISEASE WITHOUT ESOPHAGITIS: ICD-10-CM

## 2020-10-23 DIAGNOSIS — M54.42 CHRONIC MIDLINE LOW BACK PAIN WITH LEFT-SIDED SCIATICA: ICD-10-CM

## 2020-10-23 DIAGNOSIS — M06.9 RHEUMATOID ARTHRITIS OF HAND (HCC): ICD-10-CM

## 2020-10-23 DIAGNOSIS — I10 ESSENTIAL HYPERTENSION: Primary | ICD-10-CM

## 2020-10-23 DIAGNOSIS — F32.A DEPRESSIVE DISORDER: ICD-10-CM

## 2020-10-23 DIAGNOSIS — G89.29 CHRONIC MIDLINE LOW BACK PAIN WITH LEFT-SIDED SCIATICA: ICD-10-CM

## 2020-10-23 PROCEDURE — 99442 PR PHYS/QHP TELEPHONE EVALUATION 11-20 MIN: CPT | Performed by: FAMILY MEDICINE

## 2020-10-23 RX ORDER — PREDNISONE 10 MG/1
5 TABLET ORAL SEE ADMIN INSTRUCTIONS
Qty: 40 TABLET | Refills: 11 | Status: SHIPPED | OUTPATIENT
Start: 2020-10-23 | End: 2021-01-13

## 2020-10-23 RX ORDER — HYDROCODONE BITARTRATE AND ACETAMINOPHEN 10; 325 MG/1; MG/1
1 TABLET ORAL EVERY 6 HOURS PRN
Qty: 120 TABLET | Refills: 0 | Status: SHIPPED | OUTPATIENT
Start: 2020-10-23 | End: 2020-11-19 | Stop reason: SDUPTHER

## 2020-10-23 NOTE — PROGRESS NOTES
Subjective   Giselle Valdes is a 60 y.o. female.     Back Pain  This is a chronic problem. The current episode started more than 1 year ago. The problem is unchanged. The pain is present in the lumbar spine. The quality of the pain is described as aching and shooting. The pain radiates to the left foot and right thigh. The pain is at a severity of 8/10. The pain is moderate. The pain is worse during the night. The symptoms are aggravated by standing. Stiffness is present in the morning. Pertinent negatives include no bladder incontinence, bowel incontinence, chest pain or fever.   Arthritis  Presents for follow-up visit. Affected locations include the neck, left foot, right foot, left elbow, right MCP, right elbow and left MCP. Her pain is at a severity of 8/10. Pertinent negatives include no fever.   Hypertension  This is a chronic problem. The current episode started more than 1 year ago. The problem is unchanged. The problem is controlled. Associated symptoms include peripheral edema. Pertinent negatives include no chest pain, orthopnea, palpitations, PND or shortness of breath.   Heartburn  She complains of heartburn. She reports no chest pain. This is a chronic problem. The current episode started more than 1 year ago. The problem occurs frequently. The problem has been unchanged.   Depression  Visit Type: follow-up  Patient is not experiencing: depressed mood, insomnia, irritability, nervousness/anxiety, palpitations and shortness of breath.         The following portions of the patient's history were reviewed and updated as appropriate: allergies, current medications, past family history, past medical history, past social history, past surgical history and problem list.    Review of Systems   Constitutional: Negative for fever and irritability.   Respiratory: Negative for shortness of breath.    Cardiovascular: Negative for chest pain, palpitations, orthopnea and PND.   Gastrointestinal: Positive for  heartburn. Negative for bowel incontinence.   Genitourinary: Negative for bladder incontinence.   Musculoskeletal: Positive for arthralgias, arthritis and back pain.   Psychiatric/Behavioral: The patient is not nervous/anxious and does not have insomnia.        Objective   Physical Exam   Constitutional: She is oriented to person, place, and time. No distress.   Neurological: She is alert and oriented to person, place, and time.   Psychiatric: Her behavior is normal. Judgment and thought content normal.       Assessment/Plan   Problems Addressed this Visit        Cardiovascular and Mediastinum    Essential hypertension - Primary (Chronic)       Digestive    Gastroesophageal reflux disease (Chronic)       Nervous and Auditory    Chronic midline low back pain with left-sided sciatica (Chronic)    Relevant Medications    HYDROcodone-acetaminophen (NORCO)  MG per tablet       Other    Depressive disorder      Other Visit Diagnoses     Rheumatoid arthritis of hand (CMS/[facility])        Relevant Medications    predniSONE (DELTASONE) 10 MG tablet    HYDROcodone-acetaminophen (NORCO)  MG per tablet      Diagnoses       Codes Comments    Essential hypertension    -  Primary ICD-10-CM: I10  ICD-9-CM: 401.9     Chronic midline low back pain with left-sided sciatica     ICD-10-CM: M54.42, G89.29  ICD-9-CM: 724.2, 724.3, 338.29     Rheumatoid arthritis of hand (CMS/[facility])     ICD-10-CM: M06.9  ICD-9-CM: 714.0     Gastroesophageal reflux disease without esophagitis     ICD-10-CM: K21.9  ICD-9-CM: 530.81     Depressive disorder     ICD-10-CM: F32.9  ICD-9-CM: 311           I attest that all information history review of systems is accurate.  For hypertension continue atenolol losartan amlodipine.  For rheumatoid arthritis back pain continue hydrocodone and will decrease prednisone from 10 mg a day down to 5 mg a day (half pill).  For GERD continue omeprazole and for depression continue duloxetine.       You have chosen to  receive care through a telephone visit. Do you consent to use a telephone visit for your medical care today? Yes  This visit has been rescheduled as a phone visit to comply with patient safety concerns in accordance with CDC recommendations. Total time of discussion was 15minutes.          There were no vitals taken for this visit.    There is no height or weight on file to calculate BMI.            This document has been electronically signed by Mikal Trammell MD on October 23, 2020 10:10 CDT

## 2020-11-19 ENCOUNTER — OFFICE VISIT (OUTPATIENT)
Dept: FAMILY MEDICINE CLINIC | Facility: CLINIC | Age: 60
End: 2020-11-19

## 2020-11-19 DIAGNOSIS — M54.42 CHRONIC MIDLINE LOW BACK PAIN WITH LEFT-SIDED SCIATICA: ICD-10-CM

## 2020-11-19 DIAGNOSIS — M06.9 RHEUMATOID ARTHRITIS OF HAND (HCC): ICD-10-CM

## 2020-11-19 DIAGNOSIS — G89.29 CHRONIC MIDLINE LOW BACK PAIN WITH LEFT-SIDED SCIATICA: ICD-10-CM

## 2020-11-19 PROCEDURE — 99213 OFFICE O/P EST LOW 20 MIN: CPT | Performed by: FAMILY MEDICINE

## 2020-11-19 RX ORDER — HYDROCODONE BITARTRATE AND ACETAMINOPHEN 10; 325 MG/1; MG/1
1 TABLET ORAL EVERY 6 HOURS PRN
Qty: 120 TABLET | Refills: 0 | Status: SHIPPED | OUTPATIENT
Start: 2020-11-19 | End: 2020-12-17 | Stop reason: SDUPTHER

## 2020-11-19 NOTE — PROGRESS NOTES
Subjective   Giselle Valdes is a 60 y.o. female.     Back Pain  This is a chronic problem. The current episode started more than 1 year ago. The problem is unchanged. The pain is present in the lumbar spine. The quality of the pain is described as aching and shooting. The pain radiates to the left knee. The pain is at a severity of 6/10. The pain is moderate. The pain is worse during the night. The symptoms are aggravated by standing. Stiffness is present in the morning. Pertinent negatives include no bladder incontinence, bowel incontinence or fever.   Arthritis  Presents for follow-up visit. She complains of pain, stiffness and joint swelling. Affected locations include the right wrist, left wrist, right MCP, left MCP, right PIP, left PIP, left DIP and left foot. Her pain is at a severity of 6/10. Pertinent negatives include no fever. Her past medical history is significant for rheumatoid arthritis.   Rheumatoid Arthritis  This is a chronic problem. The current episode started more than 1 year ago. The problem occurs constantly. Associated symptoms include arthralgias and joint swelling. Pertinent negatives include no fever.        The following portions of the patient's history were reviewed and updated as appropriate: allergies, current medications, past family history, past medical history, past social history, past surgical history and problem list.    Review of Systems   Constitutional: Negative for fever.   Gastrointestinal: Negative for bowel incontinence.   Genitourinary: Negative for bladder incontinence.   Musculoskeletal: Positive for arthralgias, arthritis, back pain, joint swelling and stiffness.       Objective   Physical Exam   Constitutional: She is oriented to person, place, and time. No distress.   Neurological: She is alert and oriented to person, place, and time.   Psychiatric: Her behavior is normal. Judgment and thought content normal.   Nursing note reviewed.      Assessment/Plan    Problems Addressed this Visit        Nervous and Auditory    Chronic midline low back pain with left-sided sciatica (Chronic)    Relevant Medications    HYDROcodone-acetaminophen (NORCO)  MG per tablet    Other Relevant Orders    Ambulatory Referral to Pain Management (Completed)      Other Visit Diagnoses     Rheumatoid arthritis of hand (CMS/[facility])        Relevant Medications    HYDROcodone-acetaminophen (NORCO)  MG per tablet    Other Relevant Orders    Ambulatory Referral to Pain Management (Completed)      Diagnoses       Codes Comments    Chronic midline low back pain with left-sided sciatica     ICD-10-CM: M54.42, G89.29  ICD-9-CM: 724.2, 724.3, 338.29     Rheumatoid arthritis of hand (CMS/[facility])     ICD-10-CM: M06.9  ICD-9-CM: 714.0       I attest all information history and physical is accurate.    For her chronic back pain rheumatoid arthritis continue hydrocodone.  I have referred her to [facility] for appointment on her for January 14, 2021 for chronic pain management.  I will see her back in a month.    She has appointment with rheumatology in December.      ME= 38       You have chosen to receive care through a telephone visit. Do you consent to use a telephone visit for your medical care today? Yes  This visit has been rescheduled as a phone visit to comply with patient safety concerns in accordance with CDC recommendations. Total time of discussion was 11 minutes.

## 2020-12-17 ENCOUNTER — OFFICE VISIT (OUTPATIENT)
Dept: FAMILY MEDICINE CLINIC | Facility: CLINIC | Age: 60
End: 2020-12-17

## 2020-12-17 DIAGNOSIS — M06.9 RHEUMATOID ARTHRITIS OF HAND (HCC): ICD-10-CM

## 2020-12-17 DIAGNOSIS — M54.42 CHRONIC MIDLINE LOW BACK PAIN WITH LEFT-SIDED SCIATICA: ICD-10-CM

## 2020-12-17 DIAGNOSIS — G89.29 CHRONIC MIDLINE LOW BACK PAIN WITH LEFT-SIDED SCIATICA: ICD-10-CM

## 2020-12-17 PROCEDURE — 99213 OFFICE O/P EST LOW 20 MIN: CPT | Performed by: FAMILY MEDICINE

## 2020-12-17 RX ORDER — HYDROCODONE BITARTRATE AND ACETAMINOPHEN 10; 325 MG/1; MG/1
1 TABLET ORAL EVERY 6 HOURS PRN
Qty: 120 TABLET | Refills: 0 | Status: SHIPPED | OUTPATIENT
Start: 2020-12-17 | End: 2021-03-17

## 2020-12-17 NOTE — PROGRESS NOTES
Subjective   Giselle Valdes is a 60 y.o. female.     Back Pain  This is a chronic problem. The current episode started more than 1 year ago. The problem is unchanged. The pain is present in the lumbar spine. The quality of the pain is described as aching and shooting. The pain radiates to the left knee. The pain is at a severity of 6/10. The pain is moderate. The pain is worse during the night. The symptoms are aggravated by standing. Stiffness is present in the morning. Pertinent negatives include no bladder incontinence, bowel incontinence or fever.   Arthritis  Presents for follow-up visit. She complains of pain, stiffness and joint swelling. Affected locations include the right wrist, left wrist, right MCP, left MCP, right PIP, left PIP, left DIP and left foot. Her pain is at a severity of 6/10. Pertinent negatives include no fever. Her past medical history is significant for rheumatoid arthritis.   Rheumatoid Arthritis  This is a chronic problem. The current episode started more than 1 year ago. The problem occurs constantly. Associated symptoms include arthralgias and joint swelling. Pertinent negatives include no fever.        The following portions of the patient's history were reviewed and updated as appropriate: allergies, current medications, past family history, past medical history, past social history, past surgical history and problem list.    Review of Systems   Constitutional: Negative for fever.   Gastrointestinal: Negative for bowel incontinence.   Genitourinary: Negative for bladder incontinence.   Musculoskeletal: Positive for arthralgias, arthritis, back pain, joint swelling and stiffness.       Objective   Physical Exam   Constitutional: She is oriented to person, place, and time. No distress.   Neurological: She is alert and oriented to person, place, and time.   Psychiatric: Her behavior is normal. Judgment and thought content normal.   Nursing note reviewed.      Assessment/Plan    Problems Addressed this Visit        Nervous and Auditory    Chronic midline low back pain with left-sided sciatica (Chronic)    Relevant Medications    HYDROcodone-acetaminophen (NORCO)  MG per tablet      Other Visit Diagnoses     Rheumatoid arthritis of hand (CMS/[facility])        Relevant Medications    HYDROcodone-acetaminophen (NORCO)  MG per tablet      Diagnoses       Codes Comments    Chronic midline low back pain with left-sided sciatica     ICD-10-CM: M54.42, G89.29  ICD-9-CM: 724.2, 724.3, 338.29     Rheumatoid arthritis of hand (CMS/[facility])     ICD-10-CM: M06.9  ICD-9-CM: 714.0       I attest all information history and physical is accurate.    For her chronic back pain rheumatoid arthritis continue hydrocodone.  I have referred her to [facility] pain center for appointment on her for January 14, 2021 for chronic pain management.    He has seen rheumatology and they placed her on Arava.  She has not gotten it filled yet she had to have a switch to a different pharmacy.  He is going to start on that and see how that works..  Otherwise, for her hypertension, depression, and GERD follow-up with Dr. Collins 3 months      ME= 38       You have chosen to receive care through a telephone visit. Do you consent to use a telephone visit for your medical care today? Yes  This visit has been rescheduled as a phone visit to comply with patient safety concerns in accordance with CDC recommendations. Total time of discussion was 11 minutes.

## 2020-12-21 PROBLEM — M48.062 SPINAL STENOSIS OF LUMBAR REGION WITH NEUROGENIC CLAUDICATION: Status: ACTIVE | Noted: 2017-10-10

## 2020-12-21 PROBLEM — Z79.899 ON ABATACEPT THERAPY: Status: ACTIVE | Noted: 2017-10-10

## 2020-12-21 PROBLEM — M05.79 RHEUMATOID ARTHRITIS INVOLVING MULTIPLE SITES WITH POSITIVE RHEUMATOID FACTOR: Status: ACTIVE | Noted: 2017-10-10

## 2020-12-21 PROBLEM — R20.2 NUMBNESS AND TINGLING OF BOTH FEET: Status: ACTIVE | Noted: 2017-10-10

## 2020-12-21 PROBLEM — R20.0 NUMBNESS AND TINGLING OF BOTH FEET: Status: ACTIVE | Noted: 2017-10-10

## 2020-12-21 PROCEDURE — U0003 INFECTIOUS AGENT DETECTION BY NUCLEIC ACID (DNA OR RNA); SEVERE ACUTE RESPIRATORY SYNDROME CORONAVIRUS 2 (SARS-COV-2) (CORONAVIRUS DISEASE [COVID-19]), AMPLIFIED PROBE TECHNIQUE, MAKING USE OF HIGH THROUGHPUT TECHNOLOGIES AS DESCRIBED BY CMS-2020-01-R: HCPCS | Performed by: NURSE PRACTITIONER

## 2021-01-26 ENCOUNTER — LAB (OUTPATIENT)
Dept: LAB | Facility: HOSPITAL | Age: 61
End: 2021-01-26

## 2021-01-26 ENCOUNTER — TRANSCRIBE ORDERS (OUTPATIENT)
Dept: LAB | Facility: HOSPITAL | Age: 61
End: 2021-01-26

## 2021-01-26 DIAGNOSIS — Z79.899 ENCOUNTER FOR LONG-TERM (CURRENT) USE OF HIGH-RISK MEDICATION: ICD-10-CM

## 2021-01-26 DIAGNOSIS — Z79.899 ENCOUNTER FOR LONG-TERM (CURRENT) USE OF HIGH-RISK MEDICATION: Primary | ICD-10-CM

## 2021-01-26 PROCEDURE — 36415 COLL VENOUS BLD VENIPUNCTURE: CPT

## 2021-01-26 PROCEDURE — 86480 TB TEST CELL IMMUN MEASURE: CPT

## 2021-01-30 LAB
GAMMA INTERFERON BACKGROUND BLD IA-ACNC: 0.05 IU/ML
M TB IFN-G BLD-IMP: NEGATIVE
M TB IFN-G CD4+ BCKGRND COR BLD-ACNC: 0.05 IU/ML
M TB IFN-G CD4+CD8+ BCKGRND COR BLD-ACNC: 0.04 IU/ML
MITOGEN IGNF BLD-ACNC: >10 IU/ML
SERVICE CMNT-IMP: NORMAL

## 2021-03-17 ENCOUNTER — OFFICE VISIT (OUTPATIENT)
Dept: FAMILY MEDICINE CLINIC | Facility: CLINIC | Age: 61
End: 2021-03-17

## 2021-03-17 VITALS
BODY MASS INDEX: 34.16 KG/M2 | HEIGHT: 65 IN | HEART RATE: 83 BPM | WEIGHT: 205 LBS | DIASTOLIC BLOOD PRESSURE: 68 MMHG | OXYGEN SATURATION: 98 % | SYSTOLIC BLOOD PRESSURE: 123 MMHG

## 2021-03-17 DIAGNOSIS — Z12.2 ENCOUNTER FOR SCREENING FOR LUNG CANCER: ICD-10-CM

## 2021-03-17 DIAGNOSIS — Z76.89 ENCOUNTER TO ESTABLISH CARE: ICD-10-CM

## 2021-03-17 DIAGNOSIS — F32.A DEPRESSIVE DISORDER: ICD-10-CM

## 2021-03-17 DIAGNOSIS — Z87.891 HISTORY OF TOBACCO ABUSE: ICD-10-CM

## 2021-03-17 DIAGNOSIS — M54.42 CHRONIC MIDLINE LOW BACK PAIN WITH LEFT-SIDED SCIATICA: ICD-10-CM

## 2021-03-17 DIAGNOSIS — I10 ESSENTIAL HYPERTENSION: Primary | ICD-10-CM

## 2021-03-17 DIAGNOSIS — G89.29 CHRONIC MIDLINE LOW BACK PAIN WITH LEFT-SIDED SCIATICA: ICD-10-CM

## 2021-03-17 DIAGNOSIS — G47.00 INSOMNIA, UNSPECIFIED TYPE: ICD-10-CM

## 2021-03-17 PROCEDURE — 99214 OFFICE O/P EST MOD 30 MIN: CPT | Performed by: FAMILY MEDICINE

## 2021-03-17 RX ORDER — DULOXETIN HYDROCHLORIDE 60 MG/1
60 CAPSULE, DELAYED RELEASE ORAL DAILY
Qty: 90 CAPSULE | Refills: 3 | Status: SHIPPED | OUTPATIENT
Start: 2021-03-17 | End: 2022-01-24

## 2021-03-17 RX ORDER — HYDROMORPHONE HYDROCHLORIDE 2 MG/1
2 TABLET ORAL 4 TIMES DAILY
COMMUNITY
Start: 2021-03-02 | End: 2022-12-10

## 2021-03-17 RX ORDER — ATENOLOL 100 MG/1
100 TABLET ORAL DAILY
Qty: 90 TABLET | Refills: 3 | Status: SHIPPED | OUTPATIENT
Start: 2021-03-17 | End: 2022-04-01

## 2021-03-17 RX ORDER — CYCLOBENZAPRINE HCL 5 MG
5 TABLET ORAL 4 TIMES DAILY PRN
COMMUNITY

## 2021-03-17 RX ORDER — ALBUTEROL SULFATE 90 UG/1
2 AEROSOL, METERED RESPIRATORY (INHALATION)
Qty: 8 G | Refills: 11 | Status: SHIPPED | OUTPATIENT
Start: 2021-03-17 | End: 2022-08-30

## 2021-03-17 RX ORDER — LEFLUNOMIDE 20 MG/1
20 TABLET ORAL DAILY
Status: CANCELLED | OUTPATIENT
Start: 2021-03-17

## 2021-03-17 RX ORDER — AMLODIPINE BESYLATE 10 MG/1
10 TABLET ORAL DAILY
Qty: 90 TABLET | Refills: 3 | Status: SHIPPED | OUTPATIENT
Start: 2021-03-17 | End: 2022-12-10

## 2021-03-17 RX ORDER — LOSARTAN POTASSIUM 100 MG/1
100 TABLET ORAL DAILY
Qty: 90 TABLET | Refills: 3 | Status: SHIPPED | OUTPATIENT
Start: 2021-03-17 | End: 2022-04-20

## 2021-03-17 RX ORDER — TRAZODONE HYDROCHLORIDE 100 MG/1
100 TABLET ORAL NIGHTLY
Qty: 90 TABLET | Refills: 3 | Status: SHIPPED | OUTPATIENT
Start: 2021-03-17 | End: 2022-01-24

## 2021-03-17 RX ORDER — HYDROCHLOROTHIAZIDE 12.5 MG/1
12.5 CAPSULE, GELATIN COATED ORAL DAILY PRN
Qty: 30 CAPSULE | Refills: 11 | Status: SHIPPED | OUTPATIENT
Start: 2021-03-17

## 2021-04-06 ENCOUNTER — APPOINTMENT (OUTPATIENT)
Dept: GENERAL RADIOLOGY | Facility: HOSPITAL | Age: 61
End: 2021-04-06

## 2021-04-06 ENCOUNTER — HOSPITAL ENCOUNTER (EMERGENCY)
Facility: HOSPITAL | Age: 61
Discharge: HOME | End: 2021-04-06
Attending: STUDENT IN AN ORGANIZED HEALTH CARE EDUCATION/TRAINING PROGRAM | Admitting: STUDENT IN AN ORGANIZED HEALTH CARE EDUCATION/TRAINING PROGRAM

## 2021-04-06 VITALS
TEMPERATURE: 97.5 F | SYSTOLIC BLOOD PRESSURE: 140 MMHG | DIASTOLIC BLOOD PRESSURE: 81 MMHG | HEART RATE: 93 BPM | RESPIRATION RATE: 20 BRPM | WEIGHT: 200 LBS | HEIGHT: 65 IN | OXYGEN SATURATION: 97 % | BODY MASS INDEX: 33.32 KG/M2

## 2021-04-06 DIAGNOSIS — S82.65XA CLOSED NONDISPLACED FRACTURE OF LATERAL MALLEOLUS OF LEFT FIBULA, INITIAL ENCOUNTER: Primary | ICD-10-CM

## 2021-04-06 DIAGNOSIS — S82.52XA CLOSED AVULSION FRACTURE OF MEDIAL MALLEOLUS OF LEFT TIBIA, INITIAL ENCOUNTER: ICD-10-CM

## 2021-04-06 PROCEDURE — 99283 EMERGENCY DEPT VISIT LOW MDM: CPT

## 2021-04-06 PROCEDURE — 73610 X-RAY EXAM OF ANKLE: CPT

## 2021-04-06 NOTE — ED PROVIDER NOTES
Subjective   Patient presents to emergency department for left ankle pain secondary to right rolling her ankle today when getting out of a chair.  Denies any previous injury to same ankle.  She is unable to bear weigh due to pain.  She took her prescribed Oral Dilaudid for pain which is helping.        History provided by:  Patient   used: No    Ankle Pain  Location:  Ankle  Time since incident:  1 day  Injury: yes    Ankle location:  L ankle  Associated symptoms: no back pain and no fever        Review of Systems   Constitutional: Negative for chills and fever.   HENT: Negative for sore throat and trouble swallowing.    Eyes: Negative for visual disturbance.   Respiratory: Negative for shortness of breath and wheezing.    Cardiovascular: Negative for chest pain.   Gastrointestinal: Negative for abdominal pain, nausea and vomiting.   Genitourinary: Negative for dysuria and flank pain.   Musculoskeletal: Positive for arthralgias and joint swelling. Negative for back pain.   Skin: Negative for color change.   Allergic/Immunologic: Negative for immunocompromised state.   Neurological: Negative for weakness and numbness.       Past Medical History:   Diagnosis Date   • Acute maxillary sinusitis 01/14/2014   • Anorectal pain 05/03/2016   • Astigmatism 12/03/2014   • Bunion [id number]   • Chronic back pain 12/02/2015     low back and othes from RA      • Constipation 03/24/2016   • Cramp in limb 01/04/2016    lower   • Depressive disorder 08/12/2015   • Disorder of gallbladder 07/01/2015    gallstones, 6mm wall, 4 mm cbd      • Gastroesophageal reflux disease 01/25/2010   • H/O echocardiogram 04/10/2001    This is a limited study ,however the LV appears to be functioning normally with an estimated fraction of 60-65%   • History of colonic polyps 06/28/2016   • Hypermetropia 12/03/2014   • Hypertension    • Low back pain 06/14/2016    spinal stenosis on MRI Seeing korina Yen      • Neck pain  08/12/2015   • Neuropathy 08/12/2015   • Peripheral nervous system disorder 12/07/2012   • Primary malignant neoplasm of breast (CMS/HCC) 11/22/2012    no recurrence noted      • Rheumatoid arthritis (CMS/HCC) 04/01/2016   • Tobacco dependence syndrome 11/22/2012       Allergies   Allergen Reactions   • Fosamax [Alendronate] GI Intolerance     GERD   • Iodinated Diagnostic Agents Rash       Past Surgical History:   Procedure Laterality Date   • BREAST BIOPSY  02/06/2001    left ,breast mass,lobular carcinoma   • BREAST RECONSTRUCTION  10/09/2001    Bi;ateral nipple/areolar reconstruction using C-V flaps and full thickness skin grafts from groin.Acquired absence of bilateral breast   • CHOLECYSTECTOMY  06/01/2016    Laparoscopic right colon resectoin with ileocolonic anastomosis   • COLON SURGERY  2016 1/2 large colon removed   • COLONOSCOPY  06/01/2016    Laparoscopic right colon resectoin with ileocolonic anastomosis. 06/01/2016    n/a One 13 mm polyp at 90 cm prox. to the anus. Resected and retrieved.Normal colon 2 polyps.   • COLONOSCOPY N/A 7/18/2017    Procedure: COLONOSCOPY;  Surgeon: Brandyn Givens MD;  Location: [facility] ENDOSCOPY;  Service:    • ENDOSCOPY  04/10/2001    Hiatal hernia,Mild esophagitis,Due to the coarsening of the pancreas on ultrasound,will need to do a CT of the abdomen   • HAND SURGERY  11/21/1997    excision thumb lesion, MP joint    • INJECTION OF MEDICATION  09/30/2014    celestone(2)   • INJECTION OF MEDICATION  06/25/2013    DEXAMETHASONE(9)   • INJECTION OF MEDICATION  01/29/2012    SOLU-MEDROL (1)   • KNEE ARTHROSCOPY  03/01/2001    right,tear lateral meniscus   • MASTECTOMY  03/01/2001    left modified radical mastectomy,right total,varcinoma of the left breast,with lobular carcinoma in situ   • SKIN BIOPSY  08/03/1995    right,cheek lesion ,solar degeneration with sebaceous gland hyperplasia,       Family History   Problem Relation Age of Onset   • Cancer Other    •  "Heart disease Other    • Hypertension Other    • Lung disease Other    • Ulcers Other        Social History     Socioeconomic History   • Marital status:      Spouse name: Not on file   • Number of children: Not on file   • Years of education: Not on file   • Highest education level: Not on file   Tobacco Use   • Smoking status: Former Smoker     Packs/day: 1.00     Years: 45.00     Pack years: 45.00     Quit date: 2018     Years since quitting: 3.2   • Smokeless tobacco: Never Used   Substance and Sexual Activity   • Alcohol use: No   • Drug use: No   • Sexual activity: Defer           Objective      /81 (BP Location: Left arm, Patient Position: Sitting)   Pulse 93   Temp 97.5 °F (36.4 °C) (Oral)   Resp 20   Ht 165.1 cm (65\")   Wt 90.7 kg (200 lb)   SpO2 97%   BMI 33.28 kg/m²     Physical Exam  Vitals and nursing note reviewed.   Constitutional:       Appearance: Normal appearance.   HENT:      Head: Normocephalic and atraumatic.      Mouth/Throat:      Mouth: Mucous membranes are moist.   Eyes:      Conjunctiva/sclera: Conjunctivae normal.   Cardiovascular:      Rate and Rhythm: Normal rate and regular rhythm.   Pulmonary:      Effort: Pulmonary effort is normal. No respiratory distress.      Breath sounds: Normal breath sounds. No wheezing.   Musculoskeletal:         General: Swelling and tenderness present.      Comments: Tenderness and swelling noted (L) medial and lateral malleolus   Skin:     General: Skin is warm.      Capillary Refill: Capillary refill takes less than 2 seconds.   Neurological:      General: No focal deficit present.      Mental Status: She is alert.   Psychiatric:         Mood and Affect: Mood normal.         Behavior: Behavior normal.         Thought Content: Thought content normal.         Procedures           ED Course  ED Course as of Apr 06 1418   Tue Apr 06, 2021 1337 Spoke to Dr Duran, he agreed with posterior/stirrup splint, non-weight bearing with crutches " and follow up in office this week.      [GARTH]      ED Course User Index  [GARTH] Chandra Marc PA-C      XR Ankle 3+ View Left    Result Date: 4/6/2021  Narrative: EXAM DESCRIPTION:  XR ANKLE 3+ VW LEFT CLINICAL HISTORY: 60 years  Female  Ankle Pain COMPARISON: None available TECHNIQUE: Four views-AP, oblique, and lateral radiographs of the left ankle FINDINGS: There is a nondisplaced fracture involving the lateral malleolus. There is associated soft tissue swelling. The findings are compatible with an acute and/or early subacute fracture of the lateral malleolus.  There is a suspected small avulsion fracture off the tip of the medial malleolus as well. The ankle is well aligned. The posterior malleolus is intact. The talus is unremarkable.     Impression: 1. Nondisplaced fracture of the lateral malleolus with a suspected small avulsion injury/fracture from the tip of the medial malleolus. 2. Anatomic alignment noted. Electronically signed by:  Risa Lim MD  4/6/2021 1:28 PM CDT Workstation: [id number]    Discussed results with patient.  Gave educational materials.  Advised close follow up with Podiatry.  Return to emergency department for new or worsening symptoms.                                         MDM    Final diagnoses:   Closed nondisplaced fracture of lateral malleolus of left fibula, initial encounter   Closed avulsion fracture of medial malleolus of left tibia, initial encounter       ED Disposition  ED Disposition     ED Disposition Condition Comment    Discharge Stable           Atif Duran DPM  [address] [place] [id number] [phone]    Call today           Medication List      No changes were made to your prescriptions during this visit.          Chandra Marc PA-C  04/06/21 [id number]

## 2021-04-06 NOTE — ED NOTES
Pt states her left foot was asleep and as she got out of the chair her ankle rolled. Pt states she felt and heard a pop. Pt c/o pain over medial and lateral epicondyle. Pt has swelling of medial and lateral epicondyle.      Alex Jolley, RN  04/06/21 [id number]

## 2021-04-06 NOTE — ED NOTES
Stirrup and posterior leg splint applied. No complaints at this time pt verbalized wear and care instructions.     Alex Jolley RN  04/06/21 [id number]

## 2021-04-08 ENCOUNTER — OFFICE VISIT (OUTPATIENT)
Dept: PODIATRY | Facility: CLINIC | Age: 61
End: 2021-04-08

## 2021-04-08 VITALS — HEIGHT: 65 IN | WEIGHT: 200 LBS | BODY MASS INDEX: 33.32 KG/M2 | HEART RATE: 88 BPM | OXYGEN SATURATION: 98 %

## 2021-04-08 DIAGNOSIS — M25.572 ACUTE LEFT ANKLE PAIN: ICD-10-CM

## 2021-04-08 DIAGNOSIS — S93.402A MODERATE ANKLE SPRAIN, LEFT, INITIAL ENCOUNTER: ICD-10-CM

## 2021-04-08 DIAGNOSIS — S82.842A BIMALLEOLAR ANKLE FRACTURE, LEFT, CLOSED, INITIAL ENCOUNTER: Primary | ICD-10-CM

## 2021-04-08 PROCEDURE — 99203 OFFICE O/P NEW LOW 30 MIN: CPT | Performed by: PODIATRIST

## 2021-04-08 PROCEDURE — 27808 TREATMENT OF ANKLE FRACTURE: CPT | Performed by: PODIATRIST

## 2021-04-15 ENCOUNTER — APPOINTMENT (OUTPATIENT)
Dept: CT IMAGING | Facility: HOSPITAL | Age: 61
End: 2021-04-15

## 2021-04-29 ENCOUNTER — OFFICE VISIT (OUTPATIENT)
Dept: PODIATRY | Facility: CLINIC | Age: 61
End: 2021-04-29

## 2021-04-29 VITALS — OXYGEN SATURATION: 98 % | WEIGHT: 200 LBS | HEART RATE: 82 BPM | BODY MASS INDEX: 33.32 KG/M2 | HEIGHT: 65 IN

## 2021-04-29 DIAGNOSIS — S82.841D BIMALLEOLAR ANKLE FRACTURE, RIGHT, CLOSED, WITH ROUTINE HEALING, SUBSEQUENT ENCOUNTER: Primary | ICD-10-CM

## 2021-04-29 DIAGNOSIS — S93.402D MODERATE ANKLE SPRAIN, LEFT, SUBSEQUENT ENCOUNTER: ICD-10-CM

## 2021-04-29 PROCEDURE — 99024 POSTOP FOLLOW-UP VISIT: CPT | Performed by: PODIATRIST

## 2021-05-24 ENCOUNTER — OFFICE VISIT (OUTPATIENT)
Dept: PODIATRY | Facility: CLINIC | Age: 61
End: 2021-05-24

## 2021-05-24 VITALS
BODY MASS INDEX: 33.22 KG/M2 | WEIGHT: 199.4 LBS | DIASTOLIC BLOOD PRESSURE: 80 MMHG | OXYGEN SATURATION: 96 % | HEIGHT: 65 IN | SYSTOLIC BLOOD PRESSURE: 128 MMHG | HEART RATE: 86 BPM

## 2021-05-24 DIAGNOSIS — S82.842A BIMALLEOLAR ANKLE FRACTURE, LEFT, CLOSED, INITIAL ENCOUNTER: ICD-10-CM

## 2021-05-24 DIAGNOSIS — S82.841D BIMALLEOLAR ANKLE FRACTURE, RIGHT, CLOSED, WITH ROUTINE HEALING, SUBSEQUENT ENCOUNTER: Primary | ICD-10-CM

## 2021-05-24 PROCEDURE — 99024 POSTOP FOLLOW-UP VISIT: CPT | Performed by: PODIATRIST

## 2021-05-24 NOTE — PROGRESS NOTES
Giselle Valdes  1960  60 y.o. female    Patient presents to clinic today for a follow up on left ankle injury.    05/24/2021      Chief Complaint   Patient presents with   • Left Ankle - Follow-up           History of Present Illness    Giselle Valdes is a 60 y.o. female who presents for f/u evaluation of left ankle injury.  Transition to lace up style ankle brace and HEP since last visit.  Feels her pain and swelling continue to improve.  Does state that the brace seems to begin to her skin aggravate her symptoms.    Past Medical History:   Diagnosis Date   • Acute maxillary sinusitis 01/14/2014   • Anorectal pain 05/03/2016   • Astigmatism 12/03/2014   • Bunion [id number]   • Chronic back pain 12/02/2015     low back and othes from RA      • Constipation 03/24/2016   • Cramp in limb 01/04/2016    lower   • Depressive disorder 08/12/2015   • Disorder of gallbladder 07/01/2015    gallstones, 6mm wall, 4 mm cbd      • Gastroesophageal reflux disease 01/25/2010   • H/O echocardiogram 04/10/2001    This is a limited study ,however the LV appears to be functioning normally with an estimated fraction of 60-65%   • History of colonic polyps 06/28/2016   • Hypermetropia 12/03/2014   • Hypertension    • Low back pain 06/14/2016    spinal stenosis on MRI Seeing korina Yen      • Neck pain 08/12/2015   • Neuropathy 08/12/2015   • Peripheral nervous system disorder 12/07/2012   • Primary malignant neoplasm of breast (CMS/HCC) 11/22/2012    no recurrence noted      • Rheumatoid arthritis (CMS/HCC) 04/01/2016   • Tobacco dependence syndrome 11/22/2012         Past Surgical History:   Procedure Laterality Date   • BREAST BIOPSY  02/06/2001    left ,breast mass,lobular carcinoma   • BREAST RECONSTRUCTION  10/09/2001    Bi;ateral nipple/areolar reconstruction using C-V flaps and full thickness skin grafts from groin.Acquired absence of bilateral breast   • CHOLECYSTECTOMY  06/01/2016    Laparoscopic right colon  resectoin with ileocolonic anastomosis   • COLON SURGERY  2016 1/2 large colon removed   • COLONOSCOPY  06/01/2016    Laparoscopic right colon resectoin with ileocolonic anastomosis. 06/01/2016    n/a One 13 mm polyp at 90 cm prox. to the anus. Resected and retrieved.Normal colon 2 polyps.   • COLONOSCOPY N/A 7/18/2017    Procedure: COLONOSCOPY;  Surgeon: Brandyn Givens MD;  Location: [facility] ENDOSCOPY;  Service:    • ENDOSCOPY  04/10/2001    Hiatal hernia,Mild esophagitis,Due to the coarsening of the pancreas on ultrasound,will need to do a CT of the abdomen   • HAND SURGERY  11/21/1997    excision thumb lesion, MP joint    • INJECTION OF MEDICATION  09/30/2014    celestone(2)   • INJECTION OF MEDICATION  06/25/2013    DEXAMETHASONE(9)   • INJECTION OF MEDICATION  01/29/2012    SOLU-MEDROL (1)   • KNEE ARTHROSCOPY  03/01/2001    right,tear lateral meniscus   • MASTECTOMY  03/01/2001    left modified radical mastectomy,right total,varcinoma of the left breast,with lobular carcinoma in situ   • SKIN BIOPSY  08/03/1995    right,cheek lesion ,solar degeneration with sebaceous gland hyperplasia,         Family History   Problem Relation Age of Onset   • Cancer Other    • Heart disease Other    • Hypertension Other    • Lung disease Other    • Ulcers Other    • Hypertension Mother    • Heart disease Father    • Hypertension Father    • Cancer Sister    • Hypertension Sister    • Heart disease Brother    • Hypertension Brother    • Cancer Maternal Grandmother          Social History     Socioeconomic History   • Marital status:      Spouse name: Not on file   • Number of children: Not on file   • Years of education: Not on file   • Highest education level: Not on file   Tobacco Use   • Smoking status: Former Smoker     Packs/day: 1.00     Years: 45.00     Pack years: 45.00     Quit date: 2018     Years since quitting: 3.3   • Smokeless tobacco: Never Used   Vaping Use   • Vaping Use: Never used  "  Substance and Sexual Activity   • Alcohol use: No   • Drug use: No   • Sexual activity: Defer         Current Outpatient Medications   Medication Sig Dispense Refill   • albuterol sulfate  (90 Base) MCG/ACT inhaler Inhale 2 puffs 4 (Four) Times a Day. 8 g 11   • amLODIPine (NORVASC) 10 MG tablet Take 1 tablet by mouth Daily. 90 tablet 3   • atenolol (TENORMIN) 100 MG tablet Take 1 tablet by mouth Daily. 90 tablet 3   • clotrimazole (LOTRIMIN) 1 % cream Apply  topically to the appropriate area as directed 2 (Two) Times a Day for 14 days. 45 g 1   • cyclobenzaprine (FLEXERIL) 5 MG tablet Take 5 mg by mouth 4 (Four) Times a Day As Needed for Muscle Spasms.     • DULoxetine (CYMBALTA) 60 MG capsule Take 1 capsule by mouth Daily. 90 capsule 3   • hydroCHLOROthiazide (MICROZIDE) 12.5 MG capsule Take 1 capsule by mouth Daily As Needed (EDEMA). 30 capsule 11   • HYDROmorphone (DILAUDID) 2 MG tablet Take 2 mg by mouth 4 (Four) Times a Day.     • hydrOXYzine (ATARAX) 25 MG tablet May take 1-2 tablets by mouth every 6 hours as needed for itching. 30 tablet 0   • losartan (COZAAR) 100 MG tablet Take 1 tablet by mouth Daily. 90 tablet 3   • omeprazole (priLOSEC) 20 MG capsule Take 20 mg by mouth Daily.     • traZODone (DESYREL) 100 MG tablet Take 1 tablet by mouth Every Night. 90 tablet 3   • triamcinolone (KENALOG) 0.1 % cream Apply  topically to the appropriate area as directed 3 (Three) Times a Day. 45 g 0     No current facility-administered medications for this visit.         OBJECTIVE    /80   Pulse 86   Ht 165.1 cm (65\")   Wt 90.4 kg (199 lb 6.4 oz)   SpO2 96%   BMI 33.18 kg/m²       Review of Systems   Constitutional: Negative.    HENT: Negative.    Eyes: Negative.    Respiratory: Negative.    Cardiovascular: Negative.    Gastrointestinal: Negative.    Endocrine: Negative.    Genitourinary: Negative.    Musculoskeletal: Positive for back pain and joint swelling. Negative for arthralgias.        Joint " pain  Ankle pain  Foot pain   Skin: Negative.    Allergic/Immunologic: Negative.    Neurological: Negative.    Hematological: Negative.    Psychiatric/Behavioral: Negative.          Physical Exam   Constitutional: he appears well-developed and well-nourished.   HEENT: Normocephalic. Atraumatic.  CV: No CP. RRR  Resp: Non-labored respirations.  Psychiatric: he has a normal mood and affect. his behavior is normal.         Lower Extremity Exam:  Vascular: DP/PT pulses palpable 2+.   Minimal ankle edema  Foot warm  Neuro: Protective sensation intact, b/l.  Light touch sensation intact, b/l  DTRs intact  Integument: No open wounds or lesions.  No erythema, scaling  No ecchymosis  Musculoskeletal: Right muscle strength 5/5.   Left dorsiflexion, plantarflexion, inversion, eversion intact; guarded.  Achilles, TA, TP, Peroneal tendons intact  No gross instability or crepitus, exam limited by pain  Mild tenderness palpation of the distal most lateral and medial malleoli  Gait unassisted          ASSESSMENT AND PLAN    Diagnoses and all orders for this visit:    1. Bimalleolar ankle fracture, right, closed, with routine healing, subsequent encounter (Primary)    2. Bimalleolar ankle fracture, left, closed, initial encounter  -     XR Ankle 3+ View Left      -Comprehensive foot and ankle exam  -Radiographs ordered and reviewed.    -May discontinue lace up ankle brace, increase activity as tolerated.  Continue with HEP.  -Recheck 4 weeks, if symptoms not resolved            This document has been electronically signed by Atif Duran DPM on May 24, 2021 15:34 CDT       Much of this encounter note is an electronic transcription/translation of spoken language to printed text.   Atif Duran DPM  5/24/2021  15:34 CDT

## 2021-06-08 ENCOUNTER — TELEPHONE (OUTPATIENT)
Dept: FAMILY MEDICINE CLINIC | Facility: CLINIC | Age: 61
End: 2021-06-08

## 2021-06-08 DIAGNOSIS — N81.4 PROLAPSED UTERUS: Primary | ICD-10-CM

## 2021-06-08 NOTE — TELEPHONE ENCOUNTER
Called and spoke with patient, she is willing to see any GYN, I will put in referral for her to be seen sooner than a month. She was recently seen in urgent care with prolapsed uterus, she can not wait a month to be seen.

## 2021-06-08 NOTE — PROGRESS NOTES
Called and spoke to patient regarding her prolapsed uterus, she was seen in urgent care where she was told to see Dr. Vincent, however, Dr. Vincent doesn't have an opening for another month. I have put in a referral to be seen by whomever can see her fastest. Patient agreed that she had no preference of who she saw.

## 2021-06-08 NOTE — TELEPHONE ENCOUNTER
Giselle called and said she went to [facility] and they told her she had a prolapsed uterus. She said she could not get in to Dr Vincent for a month and a half and she cannot wait that long.  Asked if Candy could send a referral and try to get her in ASAP.

## 2021-06-17 ENCOUNTER — OFFICE VISIT (OUTPATIENT)
Dept: OBSTETRICS AND GYNECOLOGY | Facility: CLINIC | Age: 61
End: 2021-06-17

## 2021-06-17 VITALS
OXYGEN SATURATION: 98 % | HEART RATE: 84 BPM | SYSTOLIC BLOOD PRESSURE: 120 MMHG | WEIGHT: 203 LBS | DIASTOLIC BLOOD PRESSURE: 60 MMHG | HEIGHT: 65 IN | BODY MASS INDEX: 33.82 KG/M2

## 2021-06-17 DIAGNOSIS — N36.8 PROLAPSED URETHRAL MUCOSA: Primary | ICD-10-CM

## 2021-06-17 DIAGNOSIS — R21 RASH: ICD-10-CM

## 2021-06-17 PROCEDURE — 99203 OFFICE O/P NEW LOW 30 MIN: CPT | Performed by: OBSTETRICS & GYNECOLOGY

## 2021-06-17 NOTE — PROGRESS NOTES
Giselle Valdes is a 60 y.o. y/o female.     Chief Complaint: Prolapse    HPI:   60 y.o. No obstetric history on file..  No LMP recorded. Patient has had a hysterectomy..  Patient's had about a 4-month history of prolapse.  She has a history of a hysterectomy by  in [place] done abdominally about 20 years ago. When things started she had some hematuria but this is resolved.  She is noticed something coming out of her vagina and really is not worse with standing is more when she goes to the restroom          Review of Systems     Constitutional: Denies night sweats    HENT: No hearing changes, denies ear pain    Eye: No eye pain; no foreign body in eye    Pulmonary: No hemoptysis    Cardiovascular: No claudication    GI: No hematemesis    Musculoskeletal: No arthralgias, no joint swelling    Endocrine: No polydipsia or polyuria    Hematologic: Denies any free bleeding    Psychiatric: Denies any delusions    The following portions of the patient's history were reviewed and updated as appropriate: allergies, current medications, past family history, past medical history, past social history, past surgical history and problem list.    Allergies   Allergen Reactions   • Fosamax [Alendronate] GI Intolerance     GERD   • Iodinated Diagnostic Agents Rash        Outpatient Medications Prior to Visit   Medication Sig Dispense Refill   • albuterol sulfate  (90 Base) MCG/ACT inhaler Inhale 2 puffs 4 (Four) Times a Day. 8 g 11   • amLODIPine (NORVASC) 10 MG tablet Take 1 tablet by mouth Daily. 90 tablet 3   • atenolol (TENORMIN) 100 MG tablet Take 1 tablet by mouth Daily. 90 tablet 3   • cyclobenzaprine (FLEXERIL) 5 MG tablet Take 5 mg by mouth 4 (Four) Times a Day As Needed for Muscle Spasms.     • DULoxetine (CYMBALTA) 60 MG capsule Take 1 capsule by mouth Daily. 90 capsule 3   • fluconazole (DIFLUCAN) 150 MG tablet Take 1 tablet by mouth Daily. 5 tablet 0   • hydroCHLOROthiazide (MICROZIDE) 12.5 MG  capsule Take 1 capsule by mouth Daily As Needed (EDEMA). 30 capsule 11   • HYDROmorphone (DILAUDID) 2 MG tablet Take 2 mg by mouth 4 (Four) Times a Day.     • hydrOXYzine (ATARAX) 25 MG tablet May take 1-2 tablets by mouth every 6 hours as needed for itching. 30 tablet 0   • losartan (COZAAR) 100 MG tablet Take 1 tablet by mouth Daily. 90 tablet 3   • omeprazole (priLOSEC) 20 MG capsule Take 20 mg by mouth Daily.     • traZODone (DESYREL) 100 MG tablet Take 1 tablet by mouth Every Night. 90 tablet 3   • triamcinolone (KENALOG) 0.1 % cream Apply  topically to the appropriate area as directed 3 (Three) Times a Day. 45 g 0     No facility-administered medications prior to visit.        The patient has a family history of   Family History   Problem Relation Age of Onset   • Cancer Other    • Heart disease Other    • Hypertension Other    • Lung disease Other    • Ulcers Other    • Hypertension Mother    • Heart disease Father    • Hypertension Father    • Cancer Sister    • Hypertension Sister    • Heart disease Brother    • Hypertension Brother    • Cancer Maternal Grandmother         Past Medical History:   Diagnosis Date   • Acute maxillary sinusitis 01/14/2014   • Anorectal pain 05/03/2016   • Astigmatism 12/03/2014   • Bunion [id number]   • Chronic back pain 12/02/2015     low back and othes from RA      • Constipation 03/24/2016   • Cramp in limb 01/04/2016    lower   • Depressive disorder 08/12/2015   • Disorder of gallbladder 07/01/2015    gallstones, 6mm wall, 4 mm cbd      • Gastroesophageal reflux disease 01/25/2010   • H/O echocardiogram 04/10/2001    This is a limited study ,however the LV appears to be functioning normally with an estimated fraction of 60-65%   • History of colonic polyps 06/28/2016   • Hypermetropia 12/03/2014   • Hypertension    • Low back pain 06/14/2016    spinal stenosis on MRI Seeing korina Yen      • Neck pain 08/12/2015   • Neuropathy 08/12/2015   • Peripheral nervous  system disorder 12/07/2012   • Primary malignant neoplasm of breast (CMS/HCC) 11/22/2012    no recurrence noted      • Rheumatoid arthritis (CMS/HCC) 04/01/2016   • Tobacco dependence syndrome 11/22/2012        OB History    No obstetric history on file.          Social History     Socioeconomic History   • Marital status:      Spouse name: Not on file   • Number of children: Not on file   • Years of education: Not on file   • Highest education level: Not on file   Tobacco Use   • Smoking status: Former Smoker     Packs/day: 1.00     Years: 45.00     Pack years: 45.00     Quit date: 2018     Years since quitting: 3.4   • Smokeless tobacco: Never Used   Vaping Use   • Vaping Use: Never used   Substance and Sexual Activity   • Alcohol use: No   • Drug use: No   • Sexual activity: Defer        Past Surgical History:   Procedure Laterality Date   • BREAST BIOPSY  02/06/2001    left ,breast mass,lobular carcinoma   • BREAST RECONSTRUCTION  10/09/2001    Bi;ateral nipple/areolar reconstruction using C-V flaps and full thickness skin grafts from groin.Acquired absence of bilateral breast   • CHOLECYSTECTOMY  06/01/2016    Laparoscopic right colon resectoin with ileocolonic anastomosis   • COLON SURGERY  2016 1/2 large colon removed   • COLONOSCOPY  06/01/2016    Laparoscopic right colon resectoin with ileocolonic anastomosis. 06/01/2016    n/a One 13 mm polyp at 90 cm prox. to the anus. Resected and retrieved.Normal colon 2 polyps.   • COLONOSCOPY N/A 7/18/2017    Procedure: COLONOSCOPY;  Surgeon: Brandyn Givens MD;  Location: [facility] ENDOSCOPY;  Service:    • ENDOSCOPY  04/10/2001    Hiatal hernia,Mild esophagitis,Due to the coarsening of the pancreas on ultrasound,will need to do a CT of the abdomen   • HAND SURGERY  11/21/1997    excision thumb lesion, MP joint    • INJECTION OF MEDICATION  09/30/2014    celestone(2)   • INJECTION OF MEDICATION  06/25/2013    DEXAMETHASONE(9)   • INJECTION OF  "MEDICATION  01/29/2012    SOLU-MEDROL (1)   • KNEE ARTHROSCOPY  03/01/2001    right,tear lateral meniscus   • MASTECTOMY  03/01/2001    left modified radical mastectomy,right total,varcinoma of the left breast,with lobular carcinoma in situ   • SKIN BIOPSY  08/03/1995    right,cheek lesion ,solar degeneration with sebaceous gland hyperplasia,        Patient Active Problem List   Diagnosis   • Rheumatoid arthritis (CMS/HCC)   • Primary malignant neoplasm of breast (CMS/HCC)   • Neuropathy   • History of colonic polyps   • Depressive disorder   • Chronic midline low back pain with left-sided sciatica   • Essential hypertension   • Peripheral nervous system disorder   • Hypermetropia   • H/O echocardiogram   • Disorder of gallbladder   • Gastroesophageal reflux disease   • Cramp in limb   • Constipation   • Bunion   • Astigmatism   • Uncontrolled hypertension   • Encounter for long-term (current) use of high-risk medication   • Osteoarthritis   • Spinal stenosis of lumbar region   • Immunocompromised (CMS/HCC)   • Numbness and tingling of both feet   • On abatacept therapy   • Rheumatoid arthritis involving multiple sites with positive rheumatoid factor (CMS/[facility])   • Spinal stenosis of lumbar region with neurogenic claudication        Documented Vitals    06/17/21 1008   BP: 120/60   Pulse: 84   SpO2: 98%   Weight: 92.1 kg (203 lb)   Height: 165.1 cm (65\")        Body mass index is 33.78 kg/m².    Physical Exam  Constitutional: Appears to be in no acute distress; Eyes: Sclerae normal; Endocrine system: Thyroid palpation is normal; Pulmonary system: Lungs clear; Cardiovascular system: Heart regular rate and rhythm; Gastrointestinal system: Abdomen soft and nontender, active bowel sounds; Urologic system: CVA negative; Psychiatric: Appropriate insight; Neurologic: Gait within normal limits female genital system actually she does not seem to have a cystocele rectocele or cuff prolapse I think it is urethral prolapse " fairly large one with congestion and almost necrosis of the mucosa.  I was able to place a fem cath through the prolapse and get a small amount of urine this being consistent with urethral mucosa prolapse.  The mucosa was treated with Betadine    Laboratory Data:   No results for input(s): GLUCOSE, BUN, CREATININE, NA, K, CL, CO2, CALCIUM, PROTEINTOT, ALBUMIN, ALT, AST, ALKPHOS, BILITOT, EGFRIFNONA, GLOB, AGRATIO, BCR, ANIONGAP, BILIDIR, INDBILI in the last 80545 hours.  No results for input(s): WBC, RBC, HGB, HCT, MCV, MCH, MCHC, RDW, RDWSD, MPV, PLT in the last 50586 hours.  No results for input(s): HCGQUAL in the last 52052 hours.    Assessment        Diagnosis Plan   1. Prolapsed urethral mucosa   I called and spoke with Dr. Valenzuela today and he agrees to see her this afternoon for evaluation   Rash she has been seen for rash treated with antifungal's were to have her follow-up with dermatology    Plan       No orders of the defined types were placed in this encounter.            This document has been electronically signed by Arnold Michel MD on June 17, 2021 13:11 CDT    Please note that portions of this note were completed with a voice recognition program.

## 2021-09-17 ENCOUNTER — LAB (OUTPATIENT)
Dept: LAB | Facility: HOSPITAL | Age: 61
End: 2021-09-17

## 2021-09-17 ENCOUNTER — OFFICE VISIT (OUTPATIENT)
Dept: FAMILY MEDICINE CLINIC | Facility: CLINIC | Age: 61
End: 2021-09-17

## 2021-09-17 VITALS
SYSTOLIC BLOOD PRESSURE: 120 MMHG | DIASTOLIC BLOOD PRESSURE: 70 MMHG | WEIGHT: 205 LBS | HEART RATE: 88 BPM | OXYGEN SATURATION: 95 % | HEIGHT: 65 IN | BODY MASS INDEX: 34.16 KG/M2

## 2021-09-17 DIAGNOSIS — I10 ESSENTIAL HYPERTENSION: ICD-10-CM

## 2021-09-17 DIAGNOSIS — M06.9 RHEUMATOID ARTHRITIS INVOLVING BOTH HANDS, UNSPECIFIED WHETHER RHEUMATOID FACTOR PRESENT (HCC): ICD-10-CM

## 2021-09-17 DIAGNOSIS — I10 ESSENTIAL HYPERTENSION: Primary | ICD-10-CM

## 2021-09-17 PROCEDURE — 36415 COLL VENOUS BLD VENIPUNCTURE: CPT

## 2021-09-17 PROCEDURE — 80053 COMPREHEN METABOLIC PANEL: CPT

## 2021-09-17 PROCEDURE — 99213 OFFICE O/P EST LOW 20 MIN: CPT | Performed by: FAMILY MEDICINE

## 2021-09-17 PROCEDURE — 80061 LIPID PANEL: CPT

## 2021-09-17 PROCEDURE — 85025 COMPLETE CBC W/AUTO DIFF WBC: CPT

## 2021-09-17 RX ORDER — TRIAMCINOLONE ACETONIDE 40 MG/ML
80 INJECTION, SUSPENSION INTRA-ARTICULAR; INTRAMUSCULAR ONCE
Status: DISCONTINUED | OUTPATIENT
Start: 2021-09-17 | End: 2021-12-09

## 2021-09-18 LAB
ALBUMIN SERPL-MCNC: 4.1 G/DL (ref 3.5–5.2)
ALBUMIN/GLOB SERPL: 1.2 G/DL
ALP SERPL-CCNC: 92 U/L (ref 39–117)
ALT SERPL W P-5'-P-CCNC: 29 U/L (ref 1–33)
ANION GAP SERPL CALCULATED.3IONS-SCNC: 12 MMOL/L (ref 5–15)
AST SERPL-CCNC: 18 U/L (ref 1–32)
BASOPHILS # BLD AUTO: 0.1 10*3/MM3 (ref 0–0.2)
BASOPHILS NFR BLD AUTO: 0.9 % (ref 0–1.5)
BILIRUB SERPL-MCNC: 0.2 MG/DL (ref 0–1.2)
BUN SERPL-MCNC: 10 MG/DL (ref 8–23)
BUN/CREAT SERPL: 11.6 (ref 7–25)
CALCIUM SPEC-SCNC: 9.8 MG/DL (ref 8.6–10.5)
CHLORIDE SERPL-SCNC: 98 MMOL/L (ref 98–107)
CHOLEST SERPL-MCNC: 186 MG/DL (ref 0–200)
CO2 SERPL-SCNC: 29 MMOL/L (ref 22–29)
CREAT SERPL-MCNC: 0.86 MG/DL (ref 0.57–1)
DEPRECATED RDW RBC AUTO: 43.3 FL (ref 37–54)
EOSINOPHIL # BLD AUTO: 0.39 10*3/MM3 (ref 0–0.4)
EOSINOPHIL NFR BLD AUTO: 3.7 % (ref 0.3–6.2)
ERYTHROCYTE [DISTWIDTH] IN BLOOD BY AUTOMATED COUNT: 13.7 % (ref 12.3–15.4)
GFR SERPL CREATININE-BSD FRML MDRD: 67 ML/MIN/1.73
GLOBULIN UR ELPH-MCNC: 3.3 GM/DL
GLUCOSE SERPL-MCNC: 96 MG/DL (ref 65–99)
HCT VFR BLD AUTO: 39.2 % (ref 34–46.6)
HDLC SERPL-MCNC: 44 MG/DL (ref 40–60)
HGB BLD-MCNC: 12.7 G/DL (ref 12–15.9)
IMM GRANULOCYTES # BLD AUTO: 0.06 10*3/MM3 (ref 0–0.05)
IMM GRANULOCYTES NFR BLD AUTO: 0.6 % (ref 0–0.5)
LDLC SERPL CALC-MCNC: 98 MG/DL (ref 0–100)
LDLC/HDLC SERPL: 2.03 {RATIO}
LYMPHOCYTES # BLD AUTO: 3.17 10*3/MM3 (ref 0.7–3.1)
LYMPHOCYTES NFR BLD AUTO: 29.8 % (ref 19.6–45.3)
MCH RBC QN AUTO: 28 PG (ref 26.6–33)
MCHC RBC AUTO-ENTMCNC: 32.4 G/DL (ref 31.5–35.7)
MCV RBC AUTO: 86.3 FL (ref 79–97)
MONOCYTES # BLD AUTO: 0.99 10*3/MM3 (ref 0.1–0.9)
MONOCYTES NFR BLD AUTO: 9.3 % (ref 5–12)
NEUTROPHILS NFR BLD AUTO: 5.94 10*3/MM3 (ref 1.7–7)
NEUTROPHILS NFR BLD AUTO: 55.7 % (ref 42.7–76)
NRBC BLD AUTO-RTO: 0 /100 WBC (ref 0–0.2)
PLATELET # BLD AUTO: 406 10*3/MM3 (ref 140–450)
PMV BLD AUTO: 9.4 FL (ref 6–12)
POTASSIUM SERPL-SCNC: 4.3 MMOL/L (ref 3.5–5.2)
PROT SERPL-MCNC: 7.4 G/DL (ref 6–8.5)
RBC # BLD AUTO: 4.54 10*6/MM3 (ref 3.77–5.28)
SODIUM SERPL-SCNC: 139 MMOL/L (ref 136–145)
TRIGL SERPL-MCNC: 263 MG/DL (ref 0–150)
VLDLC SERPL-MCNC: 44 MG/DL (ref 5–40)
WBC # BLD AUTO: 10.65 10*3/MM3 (ref 3.4–10.8)

## 2021-09-21 ENCOUNTER — TELEPHONE (OUTPATIENT)
Dept: FAMILY MEDICINE CLINIC | Facility: CLINIC | Age: 61
End: 2021-09-21

## 2021-12-09 ENCOUNTER — OFFICE VISIT (OUTPATIENT)
Dept: FAMILY MEDICINE CLINIC | Facility: CLINIC | Age: 61
End: 2021-12-09

## 2021-12-09 VITALS
RESPIRATION RATE: 22 BRPM | DIASTOLIC BLOOD PRESSURE: 72 MMHG | SYSTOLIC BLOOD PRESSURE: 124 MMHG | HEIGHT: 65 IN | BODY MASS INDEX: 34.55 KG/M2 | HEART RATE: 97 BPM | WEIGHT: 207.4 LBS | OXYGEN SATURATION: 98 %

## 2021-12-09 DIAGNOSIS — M06.9 RHEUMATOID ARTHRITIS INVOLVING BOTH HANDS, UNSPECIFIED WHETHER RHEUMATOID FACTOR PRESENT (HCC): Primary | ICD-10-CM

## 2021-12-09 PROCEDURE — 99213 OFFICE O/P EST LOW 20 MIN: CPT | Performed by: NURSE PRACTITIONER

## 2021-12-09 PROCEDURE — 96372 THER/PROPH/DIAG INJ SC/IM: CPT | Performed by: NURSE PRACTITIONER

## 2021-12-09 RX ORDER — TRIAMCINOLONE ACETONIDE 40 MG/ML
80 INJECTION, SUSPENSION INTRA-ARTICULAR; INTRAMUSCULAR ONCE
Status: COMPLETED | OUTPATIENT
Start: 2021-12-09 | End: 2021-12-09

## 2021-12-09 RX ADMIN — TRIAMCINOLONE ACETONIDE 80 MG: 40 INJECTION, SUSPENSION INTRA-ARTICULAR; INTRAMUSCULAR at 10:53

## 2021-12-09 NOTE — PROGRESS NOTES
Chief Complaint  Arthritis    Subjective          Giselle Valdes presents to [facility] [place] with issues of RA. The past few days her pain and discomfort have got worse.         Rheumatoid Arthritis  This is a chronic problem. The current episode started more than 1 year ago. The problem occurs constantly. Associated symptoms include arthralgias and joint swelling. Pertinent negatives include no chest pain, chills, congestion, coughing, myalgias, nausea, sore throat or weakness. Nothing aggravates the symptoms. She has tried rest for the symptoms. The treatment provided no relief.     Outpatient Medications Prior to Visit   Medication Sig Dispense Refill   • albuterol sulfate  (90 Base) MCG/ACT inhaler Inhale 2 puffs 4 (Four) Times a Day. 8 g 11   • amLODIPine (NORVASC) 10 MG tablet Take 1 tablet by mouth Daily. 90 tablet 3   • atenolol (TENORMIN) 100 MG tablet Take 1 tablet by mouth Daily. 90 tablet 3   • cyclobenzaprine (FLEXERIL) 5 MG tablet Take 5 mg by mouth 4 (Four) Times a Day As Needed for Muscle Spasms.     • DULoxetine (CYMBALTA) 60 MG capsule Take 1 capsule by mouth Daily. 90 capsule 3   • hydroCHLOROthiazide (MICROZIDE) 12.5 MG capsule Take 1 capsule by mouth Daily As Needed (EDEMA). 30 capsule 11   • HYDROmorphone (DILAUDID) 2 MG tablet Take 2 mg by mouth 4 (Four) Times a Day.     • losartan (COZAAR) 100 MG tablet Take 1 tablet by mouth Daily. 90 tablet 3   • omeprazole (priLOSEC) 20 MG capsule Take 20 mg by mouth Daily.     • traZODone (DESYREL) 100 MG tablet Take 1 tablet by mouth Every Night. 90 tablet 3     Facility-Administered Medications Prior to Visit   Medication Dose Route Frequency Provider Last Rate Last Admin   • triamcinolone acetonide (KENALOG-40) injection 80 mg  80 mg Intramuscular Once Marychuy Collins MD           Review of Systems   Constitutional: Negative for activity change, appetite change and chills.   HENT:  "Negative for congestion, ear pain, sore throat and trouble swallowing.    Eyes: Negative for discharge, itching and visual disturbance.   Respiratory: Negative for apnea, cough and wheezing.    Cardiovascular: Negative for chest pain and leg swelling.   Gastrointestinal: Negative for abdominal distention, constipation, diarrhea and nausea.   Endocrine: Negative for cold intolerance, heat intolerance and polyuria.   Genitourinary: Negative for dysuria, frequency and urgency.   Musculoskeletal: Positive for arthralgias and joint swelling. Negative for back pain and myalgias.   Skin: Negative for color change, pallor and wound.   Neurological: Negative for dizziness, seizures, syncope, weakness and light-headedness.   Psychiatric/Behavioral: Negative for agitation, confusion and sleep disturbance. The patient is not nervous/anxious.          Objective   Vital Signs:   Visit Vitals  /72 (BP Location: Right arm, Patient Position: Sitting, Cuff Size: Adult)   Pulse 97   Resp 22   Ht 165.1 cm (65\")   Wt 94.1 kg (207 lb 6.4 oz)   SpO2 98%   BMI 34.51 kg/m²     Physical Exam  Vitals and nursing note reviewed.   Constitutional:       Appearance: She is well-developed.   HENT:      Head: Normocephalic and atraumatic.   Eyes:      General: Lids are normal.      Conjunctiva/sclera: Conjunctivae normal.   Neck:      Thyroid: No thyroid mass or thyromegaly.      Trachea: Trachea normal. No tracheal tenderness.   Cardiovascular:      Rate and Rhythm: Normal rate.      Pulses: Normal pulses.      Heart sounds: Normal heart sounds.   Pulmonary:      Effort: Pulmonary effort is normal. No respiratory distress.      Breath sounds: Normal breath sounds. No wheezing.   Abdominal:      General: There is no distension.      Palpations: Abdomen is soft. There is no mass.   Musculoskeletal:         General: Normal range of motion.      Cervical back: Normal range of motion. No edema.   Lymphadenopathy:      Head:      Right side of " head: No submental, submandibular or tonsillar adenopathy.      Left side of head: No submental, submandibular or tonsillar adenopathy.   Skin:     General: Skin is warm and dry.      Coloration: Skin is not pale.      Findings: No abrasion, erythema or lesion.   Neurological:      Mental Status: She is alert and oriented to person, place, and time.   Psychiatric:         Mood and Affect: Mood is not anxious. Affect is not inappropriate.         Speech: Speech normal.         Behavior: Behavior normal.         Thought Content: Thought content normal.         Judgment: Judgment normal. Judgment is not impulsive.        Result Review :                 Assessment and Plan    Diagnoses and all orders for this visit:    1. Rheumatoid arthritis involving both hands, unspecified whether rheumatoid factor present (HCC) (Primary)  -     triamcinolone acetonide (KENALOG-40) injection 80 mg        Kenalog injection to help with pain     Please call the office if you have any issues      I spent 21 minutes caring for Giselle on this date of service. This time includes time spent by me in the following activities:preparing for the visit, performing a medically appropriate examination and/or evaluation , ordering medications, tests, or procedures and documenting information in the medical record  Follow Up   Return if symptoms worsen or fail to improve, for Next scheduled follow up.  Patient was given instructions and counseling regarding her condition or for health maintenance advice. Please see specific information pulled into the AVS if appropriate.           This document has been electronically signed by HEATHER Rutherford on December 9, 2021 10:47 CST

## 2021-12-28 ENCOUNTER — TELEPHONE (OUTPATIENT)
Dept: FAMILY MEDICINE CLINIC | Facility: CLINIC | Age: 61
End: 2021-12-28

## 2021-12-28 DIAGNOSIS — M06.9 RHEUMATOID ARTHRITIS OF HAND, UNSPECIFIED LATERALITY, UNSPECIFIED WHETHER RHEUMATOID FACTOR PRESENT (HCC): Primary | ICD-10-CM

## 2021-12-28 NOTE — TELEPHONE ENCOUNTER
PT REQUESTED A REFERRAL FOR A RHEUMATOLOGIST, DR. HARMON, LOCATED IN [place].     FAX: [phone]

## 2021-12-29 NOTE — TELEPHONE ENCOUNTER
Referral placed.  Please let her know that his wait times have been longer than normal this last year (several months for new patient appointment).  Most rheumatology providers have similar wait times.  I had documented that she was seeing a provider in [place], if this is the case she should follow with this specialist until she is able to be seen by new provider.  Thanks, OSWALDO Collins

## 2022-01-24 DIAGNOSIS — G47.00 INSOMNIA, UNSPECIFIED TYPE: ICD-10-CM

## 2022-01-24 DIAGNOSIS — F32.A DEPRESSIVE DISORDER: ICD-10-CM

## 2022-01-24 RX ORDER — DULOXETIN HYDROCHLORIDE 60 MG/1
CAPSULE, DELAYED RELEASE ORAL
Qty: 90 CAPSULE | Refills: 3 | Status: SHIPPED | OUTPATIENT
Start: 2022-01-24 | End: 2023-02-13

## 2022-01-24 RX ORDER — TRAZODONE HYDROCHLORIDE 100 MG/1
TABLET ORAL
Qty: 90 TABLET | Refills: 2 | Status: SHIPPED | OUTPATIENT
Start: 2022-01-24 | End: 2022-11-16

## 2022-03-17 ENCOUNTER — OFFICE VISIT (OUTPATIENT)
Dept: FAMILY MEDICINE CLINIC | Facility: CLINIC | Age: 62
End: 2022-03-17

## 2022-03-17 VITALS
DIASTOLIC BLOOD PRESSURE: 74 MMHG | BODY MASS INDEX: 34.66 KG/M2 | SYSTOLIC BLOOD PRESSURE: 140 MMHG | HEART RATE: 88 BPM | OXYGEN SATURATION: 95 % | HEIGHT: 65 IN | WEIGHT: 208 LBS

## 2022-03-17 DIAGNOSIS — G47.00 INSOMNIA, UNSPECIFIED TYPE: ICD-10-CM

## 2022-03-17 DIAGNOSIS — M06.9 RHEUMATOID ARTHRITIS INVOLVING BOTH HANDS, UNSPECIFIED WHETHER RHEUMATOID FACTOR PRESENT: ICD-10-CM

## 2022-03-17 DIAGNOSIS — F32.A DEPRESSIVE DISORDER: ICD-10-CM

## 2022-03-17 DIAGNOSIS — I10 ESSENTIAL HYPERTENSION: Primary | ICD-10-CM

## 2022-03-17 DIAGNOSIS — Z87.891 HISTORY OF NICOTINE DEPENDENCE: ICD-10-CM

## 2022-03-17 PROCEDURE — 99214 OFFICE O/P EST MOD 30 MIN: CPT | Performed by: FAMILY MEDICINE

## 2022-03-17 RX ORDER — ABATACEPT 125 MG/ML
INJECTION, SOLUTION SUBCUTANEOUS
COMMUNITY
Start: 2022-03-07

## 2022-04-01 DIAGNOSIS — I10 ESSENTIAL HYPERTENSION: ICD-10-CM

## 2022-04-01 RX ORDER — ATENOLOL 100 MG/1
TABLET ORAL
Qty: 90 TABLET | Refills: 2 | Status: SHIPPED | OUTPATIENT
Start: 2022-04-01 | End: 2023-01-04

## 2022-04-20 DIAGNOSIS — I10 ESSENTIAL HYPERTENSION: ICD-10-CM

## 2022-04-20 RX ORDER — LOSARTAN POTASSIUM 100 MG/1
TABLET ORAL
Qty: 90 TABLET | Refills: 2 | Status: SHIPPED | OUTPATIENT
Start: 2022-04-20 | End: 2023-01-04

## 2022-07-18 ENCOUNTER — TRANSCRIBE ORDERS (OUTPATIENT)
Dept: MRI IMAGING | Facility: HOSPITAL | Age: 62
End: 2022-07-18

## 2022-07-18 DIAGNOSIS — M47.812 CERVICAL SPONDYLOSIS WITHOUT MYELOPATHY: ICD-10-CM

## 2022-07-18 DIAGNOSIS — M47.813 SPONDYLOSIS OF CERVICOTHORACIC REGION WITHOUT MYELOPATHY OR RADICULOPATHY: ICD-10-CM

## 2022-07-18 DIAGNOSIS — M47.817 LUMBOSACRAL SPONDYLOSIS WITHOUT MYELOPATHY: ICD-10-CM

## 2022-07-18 DIAGNOSIS — M47.816 LUMBAR SPONDYLOSIS: Primary | ICD-10-CM

## 2022-07-19 ENCOUNTER — HOSPITAL ENCOUNTER (OUTPATIENT)
Dept: MRI IMAGING | Facility: HOSPITAL | Age: 62
Discharge: HOME | End: 2022-07-19

## 2022-07-19 DIAGNOSIS — M47.817 LUMBOSACRAL SPONDYLOSIS WITHOUT MYELOPATHY: ICD-10-CM

## 2022-07-19 DIAGNOSIS — M47.813 SPONDYLOSIS OF CERVICOTHORACIC REGION WITHOUT MYELOPATHY OR RADICULOPATHY: ICD-10-CM

## 2022-07-19 DIAGNOSIS — M47.812 CERVICAL SPONDYLOSIS WITHOUT MYELOPATHY: ICD-10-CM

## 2022-07-19 DIAGNOSIS — M47.816 LUMBAR SPONDYLOSIS: ICD-10-CM

## 2022-07-19 PROCEDURE — 72148 MRI LUMBAR SPINE W/O DYE: CPT

## 2022-07-19 PROCEDURE — 72141 MRI NECK SPINE W/O DYE: CPT

## 2022-08-30 RX ORDER — ALBUTEROL SULFATE 90 UG/1
AEROSOL, METERED RESPIRATORY (INHALATION)
Qty: 8.5 G | Refills: 10 | Status: SHIPPED | OUTPATIENT
Start: 2022-08-30

## 2022-11-16 DIAGNOSIS — G47.00 INSOMNIA, UNSPECIFIED TYPE: ICD-10-CM

## 2022-11-16 RX ORDER — TRAZODONE HYDROCHLORIDE 100 MG/1
TABLET ORAL
Qty: 90 TABLET | Refills: 2 | Status: SHIPPED | OUTPATIENT
Start: 2022-11-16

## 2023-01-04 DIAGNOSIS — I10 ESSENTIAL HYPERTENSION: ICD-10-CM

## 2023-01-04 RX ORDER — ATENOLOL 100 MG/1
TABLET ORAL
Qty: 90 TABLET | Refills: 2 | Status: SHIPPED | OUTPATIENT
Start: 2023-01-04

## 2023-01-04 RX ORDER — LOSARTAN POTASSIUM 100 MG/1
TABLET ORAL
Qty: 90 TABLET | Refills: 2 | Status: SHIPPED | OUTPATIENT
Start: 2023-01-04

## 2023-01-05 ENCOUNTER — TRANSCRIBE ORDERS (OUTPATIENT)
Dept: LAB | Facility: HOSPITAL | Age: 63
End: 2023-01-05
Payer: COMMERCIAL

## 2023-01-05 ENCOUNTER — LAB (OUTPATIENT)
Dept: LAB | Facility: HOSPITAL | Age: 63
End: 2023-01-05
Payer: COMMERCIAL

## 2023-01-05 DIAGNOSIS — Z79.899 ENCOUNTER FOR LONG-TERM (CURRENT) USE OF OTHER MEDICATIONS: ICD-10-CM

## 2023-01-05 DIAGNOSIS — M05.79 SEROPOSITIVE RHEUMATOID ARTHRITIS OF MULTIPLE SITES: ICD-10-CM

## 2023-01-05 DIAGNOSIS — Z79.899 ENCOUNTER FOR LONG-TERM (CURRENT) USE OF OTHER MEDICATIONS: Primary | ICD-10-CM

## 2023-01-05 LAB
ALBUMIN SERPL-MCNC: 4 G/DL (ref 3.5–5.2)
ALBUMIN/GLOB SERPL: 1.4 G/DL
ALP SERPL-CCNC: 76 U/L (ref 39–117)
ALT SERPL W P-5'-P-CCNC: 23 U/L (ref 1–33)
ANION GAP SERPL CALCULATED.3IONS-SCNC: 7.6 MMOL/L (ref 5–15)
AST SERPL-CCNC: 19 U/L (ref 1–32)
BASOPHILS # BLD AUTO: 0.06 10*3/MM3 (ref 0–0.2)
BASOPHILS NFR BLD AUTO: 0.8 % (ref 0–1.5)
BILIRUB SERPL-MCNC: 0.2 MG/DL (ref 0–1.2)
BUN SERPL-MCNC: 12 MG/DL (ref 8–23)
BUN/CREAT SERPL: 12.1 (ref 7–25)
CALCIUM SPEC-SCNC: 9.5 MG/DL (ref 8.6–10.5)
CHLORIDE SERPL-SCNC: 103 MMOL/L (ref 98–107)
CO2 SERPL-SCNC: 28.4 MMOL/L (ref 22–29)
CREAT SERPL-MCNC: 0.99 MG/DL (ref 0.57–1)
DEPRECATED RDW RBC AUTO: 42 FL (ref 37–54)
EGFRCR SERPLBLD CKD-EPI 2021: 63.8 ML/MIN/1.73
EOSINOPHIL # BLD AUTO: 0.62 10*3/MM3 (ref 0–0.4)
EOSINOPHIL NFR BLD AUTO: 8.1 % (ref 0.3–6.2)
ERYTHROCYTE [DISTWIDTH] IN BLOOD BY AUTOMATED COUNT: 12.9 % (ref 12.3–15.4)
ERYTHROCYTE [SEDIMENTATION RATE] IN BLOOD: 16 MM/HR (ref 0–30)
GLOBULIN UR ELPH-MCNC: 2.9 GM/DL
GLUCOSE SERPL-MCNC: 129 MG/DL (ref 65–99)
HCT VFR BLD AUTO: 38.5 % (ref 34–46.6)
HGB BLD-MCNC: 13.2 G/DL (ref 12–15.9)
IMM GRANULOCYTES # BLD AUTO: 0.03 10*3/MM3 (ref 0–0.05)
IMM GRANULOCYTES NFR BLD AUTO: 0.4 % (ref 0–0.5)
LYMPHOCYTES # BLD AUTO: 2.27 10*3/MM3 (ref 0.7–3.1)
LYMPHOCYTES NFR BLD AUTO: 29.5 % (ref 19.6–45.3)
MCH RBC QN AUTO: 30.5 PG (ref 26.6–33)
MCHC RBC AUTO-ENTMCNC: 34.3 G/DL (ref 31.5–35.7)
MCV RBC AUTO: 88.9 FL (ref 79–97)
MONOCYTES # BLD AUTO: 0.54 10*3/MM3 (ref 0.1–0.9)
MONOCYTES NFR BLD AUTO: 7 % (ref 5–12)
NEUTROPHILS NFR BLD AUTO: 4.17 10*3/MM3 (ref 1.7–7)
NEUTROPHILS NFR BLD AUTO: 54.2 % (ref 42.7–76)
NRBC BLD AUTO-RTO: 0 /100 WBC (ref 0–0.2)
PLATELET # BLD AUTO: 349 10*3/MM3 (ref 140–450)
PMV BLD AUTO: 9.3 FL (ref 6–12)
POTASSIUM SERPL-SCNC: 4 MMOL/L (ref 3.5–5.2)
PROT SERPL-MCNC: 6.9 G/DL (ref 6–8.5)
RBC # BLD AUTO: 4.33 10*6/MM3 (ref 3.77–5.28)
SODIUM SERPL-SCNC: 139 MMOL/L (ref 136–145)
WBC NRBC COR # BLD: 7.69 10*3/MM3 (ref 3.4–10.8)

## 2023-01-05 PROCEDURE — 85025 COMPLETE CBC W/AUTO DIFF WBC: CPT

## 2023-01-05 PROCEDURE — 80053 COMPREHEN METABOLIC PANEL: CPT

## 2023-01-05 PROCEDURE — 36415 COLL VENOUS BLD VENIPUNCTURE: CPT

## 2023-01-05 PROCEDURE — 85652 RBC SED RATE AUTOMATED: CPT

## 2023-01-05 PROCEDURE — 86480 TB TEST CELL IMMUN MEASURE: CPT

## 2023-01-07 LAB
GAMMA INTERFERON BACKGROUND BLD IA-ACNC: 0.08 IU/ML
M TB IFN-G BLD-IMP: NEGATIVE
M TB IFN-G CD4+ BCKGRND COR BLD-ACNC: 0.06 IU/ML
M TB IFN-G CD4+CD8+ BCKGRND COR BLD-ACNC: 0.06 IU/ML
MITOGEN IGNF BLD-ACNC: 9.87 IU/ML
QUANTIFERON INCUBATION: NORMAL
SERVICE CMNT-IMP: NORMAL

## 2023-02-12 DIAGNOSIS — F32.A DEPRESSIVE DISORDER: ICD-10-CM

## 2023-02-13 RX ORDER — DULOXETIN HYDROCHLORIDE 60 MG/1
CAPSULE, DELAYED RELEASE ORAL
Qty: 90 CAPSULE | Refills: 3 | Status: SHIPPED | OUTPATIENT
Start: 2023-02-13

## 2023-04-12 ENCOUNTER — TRANSCRIBE ORDERS (OUTPATIENT)
Dept: LAB | Facility: HOSPITAL | Age: 63
End: 2023-04-12
Payer: COMMERCIAL

## 2023-04-12 ENCOUNTER — LAB (OUTPATIENT)
Dept: LAB | Facility: HOSPITAL | Age: 63
End: 2023-04-12
Payer: COMMERCIAL

## 2023-04-12 DIAGNOSIS — Z79.899 ENCOUNTER FOR LONG-TERM (CURRENT) USE OF OTHER MEDICATIONS: Primary | ICD-10-CM

## 2023-04-12 DIAGNOSIS — Z79.899 ENCOUNTER FOR LONG-TERM (CURRENT) USE OF OTHER MEDICATIONS: ICD-10-CM

## 2023-04-12 DIAGNOSIS — M05.79 SEROPOSITIVE RHEUMATOID ARTHRITIS OF MULTIPLE SITES: ICD-10-CM

## 2023-04-12 PROCEDURE — 80053 COMPREHEN METABOLIC PANEL: CPT

## 2023-04-12 PROCEDURE — 85652 RBC SED RATE AUTOMATED: CPT

## 2023-04-12 PROCEDURE — 36415 COLL VENOUS BLD VENIPUNCTURE: CPT

## 2023-04-12 PROCEDURE — 85025 COMPLETE CBC W/AUTO DIFF WBC: CPT

## 2023-04-13 LAB
ALBUMIN SERPL-MCNC: 3.9 G/DL (ref 3.5–5.2)
ALBUMIN/GLOB SERPL: 1.3 G/DL
ALP SERPL-CCNC: 74 U/L (ref 39–117)
ALT SERPL W P-5'-P-CCNC: 28 U/L (ref 1–33)
ANION GAP SERPL CALCULATED.3IONS-SCNC: 9.6 MMOL/L (ref 5–15)
AST SERPL-CCNC: 26 U/L (ref 1–32)
BASOPHILS # BLD AUTO: 0.07 10*3/MM3 (ref 0–0.2)
BASOPHILS NFR BLD AUTO: 0.9 % (ref 0–1.5)
BILIRUB SERPL-MCNC: 0.3 MG/DL (ref 0–1.2)
BUN SERPL-MCNC: 14 MG/DL (ref 8–23)
BUN/CREAT SERPL: 14 (ref 7–25)
CALCIUM SPEC-SCNC: 9.9 MG/DL (ref 8.6–10.5)
CHLORIDE SERPL-SCNC: 105 MMOL/L (ref 98–107)
CO2 SERPL-SCNC: 26.4 MMOL/L (ref 22–29)
CREAT SERPL-MCNC: 1 MG/DL (ref 0.57–1)
DEPRECATED RDW RBC AUTO: 41.8 FL (ref 37–54)
EGFRCR SERPLBLD CKD-EPI 2021: 63 ML/MIN/1.73
EOSINOPHIL # BLD AUTO: 0.59 10*3/MM3 (ref 0–0.4)
EOSINOPHIL NFR BLD AUTO: 7.9 % (ref 0.3–6.2)
ERYTHROCYTE [DISTWIDTH] IN BLOOD BY AUTOMATED COUNT: 13 % (ref 12.3–15.4)
ERYTHROCYTE [SEDIMENTATION RATE] IN BLOOD: 17 MM/HR (ref 0–30)
GLOBULIN UR ELPH-MCNC: 3.1 GM/DL
GLUCOSE SERPL-MCNC: 111 MG/DL (ref 65–99)
HCT VFR BLD AUTO: 39.2 % (ref 34–46.6)
HGB BLD-MCNC: 13.5 G/DL (ref 12–15.9)
IMM GRANULOCYTES # BLD AUTO: 0.04 10*3/MM3 (ref 0–0.05)
IMM GRANULOCYTES NFR BLD AUTO: 0.5 % (ref 0–0.5)
LYMPHOCYTES # BLD AUTO: 2.27 10*3/MM3 (ref 0.7–3.1)
LYMPHOCYTES NFR BLD AUTO: 30.6 % (ref 19.6–45.3)
MCH RBC QN AUTO: 30.3 PG (ref 26.6–33)
MCHC RBC AUTO-ENTMCNC: 34.4 G/DL (ref 31.5–35.7)
MCV RBC AUTO: 87.9 FL (ref 79–97)
MONOCYTES # BLD AUTO: 0.6 10*3/MM3 (ref 0.1–0.9)
MONOCYTES NFR BLD AUTO: 8.1 % (ref 5–12)
NEUTROPHILS NFR BLD AUTO: 3.86 10*3/MM3 (ref 1.7–7)
NEUTROPHILS NFR BLD AUTO: 52 % (ref 42.7–76)
NRBC BLD AUTO-RTO: 0 /100 WBC (ref 0–0.2)
PLATELET # BLD AUTO: 313 10*3/MM3 (ref 140–450)
PMV BLD AUTO: 9.2 FL (ref 6–12)
POTASSIUM SERPL-SCNC: 4.3 MMOL/L (ref 3.5–5.2)
PROT SERPL-MCNC: 7 G/DL (ref 6–8.5)
RBC # BLD AUTO: 4.46 10*6/MM3 (ref 3.77–5.28)
SODIUM SERPL-SCNC: 141 MMOL/L (ref 136–145)
WBC NRBC COR # BLD: 7.43 10*3/MM3 (ref 3.4–10.8)

## 2023-05-12 PROCEDURE — 87635 SARS-COV-2 COVID-19 AMP PRB: CPT | Performed by: NURSE PRACTITIONER

## 2023-08-18 DIAGNOSIS — G47.00 INSOMNIA, UNSPECIFIED TYPE: ICD-10-CM

## 2023-08-18 RX ORDER — TRAZODONE HYDROCHLORIDE 100 MG/1
TABLET ORAL
Qty: 180 TABLET | Refills: 0 | Status: SHIPPED | OUTPATIENT
Start: 2023-08-18

## 2023-09-01 DIAGNOSIS — I10 ESSENTIAL HYPERTENSION: ICD-10-CM

## 2023-09-01 RX ORDER — ATENOLOL 100 MG/1
TABLET ORAL
Qty: 180 TABLET | Refills: 0 | Status: SHIPPED | OUTPATIENT
Start: 2023-09-01

## 2023-09-27 ENCOUNTER — TRANSCRIBE ORDERS (OUTPATIENT)
Dept: ADMINISTRATIVE | Facility: HOSPITAL | Age: 63
End: 2023-09-27
Payer: MEDICARE

## 2023-09-27 DIAGNOSIS — G89.4 CHRONIC PAIN SYNDROME: Primary | ICD-10-CM

## 2023-09-27 DIAGNOSIS — Z01.818 PRE-OP TESTING: ICD-10-CM

## 2023-09-27 DIAGNOSIS — Z01.812 PRE-PROCEDURE LAB EXAM: ICD-10-CM

## 2023-09-27 PROCEDURE — 93005 ELECTROCARDIOGRAM TRACING: CPT | Performed by: PAIN MEDICINE

## 2023-09-28 ENCOUNTER — CLINICAL SUPPORT (OUTPATIENT)
Dept: CARDIOLOGY | Facility: CLINIC | Age: 63
End: 2023-09-28
Payer: MEDICARE

## 2023-09-28 ENCOUNTER — LAB (OUTPATIENT)
Dept: LAB | Facility: HOSPITAL | Age: 63
End: 2023-09-28
Payer: MEDICARE

## 2023-09-28 DIAGNOSIS — G89.4 CHRONIC PAIN SYNDROME: ICD-10-CM

## 2023-09-28 DIAGNOSIS — Z01.818 PRE-OP EXAM: Primary | ICD-10-CM

## 2023-09-28 DIAGNOSIS — Z01.812 PRE-PROCEDURE LAB EXAM: ICD-10-CM

## 2023-09-28 LAB
BASOPHILS # BLD AUTO: 0.08 10*3/MM3 (ref 0–0.2)
BASOPHILS NFR BLD AUTO: 1.1 % (ref 0–1.5)
DEPRECATED RDW RBC AUTO: 40.5 FL (ref 37–54)
EOSINOPHIL # BLD AUTO: 0.39 10*3/MM3 (ref 0–0.4)
EOSINOPHIL NFR BLD AUTO: 5.4 % (ref 0.3–6.2)
ERYTHROCYTE [DISTWIDTH] IN BLOOD BY AUTOMATED COUNT: 12.3 % (ref 12.3–15.4)
HCT VFR BLD AUTO: 39.9 % (ref 34–46.6)
HGB BLD-MCNC: 13.5 G/DL (ref 12–15.9)
IMM GRANULOCYTES # BLD AUTO: 0.05 10*3/MM3 (ref 0–0.05)
IMM GRANULOCYTES NFR BLD AUTO: 0.7 % (ref 0–0.5)
LYMPHOCYTES # BLD AUTO: 2.21 10*3/MM3 (ref 0.7–3.1)
LYMPHOCYTES NFR BLD AUTO: 30.6 % (ref 19.6–45.3)
MCH RBC QN AUTO: 30.5 PG (ref 26.6–33)
MCHC RBC AUTO-ENTMCNC: 33.8 G/DL (ref 31.5–35.7)
MCV RBC AUTO: 90.3 FL (ref 79–97)
MONOCYTES # BLD AUTO: 0.49 10*3/MM3 (ref 0.1–0.9)
MONOCYTES NFR BLD AUTO: 6.8 % (ref 5–12)
NEUTROPHILS NFR BLD AUTO: 4.01 10*3/MM3 (ref 1.7–7)
NEUTROPHILS NFR BLD AUTO: 55.4 % (ref 42.7–76)
NRBC BLD AUTO-RTO: 0 /100 WBC (ref 0–0.2)
PLATELET # BLD AUTO: 302 10*3/MM3 (ref 140–450)
PMV BLD AUTO: 9.2 FL (ref 6–12)
QT INTERVAL: 374 MS
QTC INTERVAL: 445 MS
RBC # BLD AUTO: 4.42 10*6/MM3 (ref 3.77–5.28)
WBC NRBC COR # BLD: 7.23 10*3/MM3 (ref 3.4–10.8)

## 2023-09-28 PROCEDURE — 36415 COLL VENOUS BLD VENIPUNCTURE: CPT

## 2023-09-28 PROCEDURE — 93000 ELECTROCARDIOGRAM COMPLETE: CPT | Performed by: INTERNAL MEDICINE

## 2023-09-28 PROCEDURE — 85025 COMPLETE CBC W/AUTO DIFF WBC: CPT

## 2023-11-28 NOTE — PROGRESS NOTES
Giselle Valdes  1960  60 y.o. female    Patient presents to clinic today for a follow up from ER visit on 04/06/2021 for left ankle pain after falling.   04/08/2021  Chief Complaint   Patient presents with   • Left Ankle - Pain           History of Present Illness    Giselle Valdes is a 60 y.o. female who presents for evaluation of left ankle injury.  Injury occurred 2 days prior.  Patient states that she stood up at home and her left leg was asleep.  When she began to bear weight she immediately fell to the ground and rolled her ankle.  She noted immediate swelling bruising, pain and inability to bear weight.  She was seen in emergency department and small avulsion fractures of both malleoli were identified.  She was placed into a splint and has been largely nonweightbearing since that time.      Past Medical History:   Diagnosis Date   • Acute maxillary sinusitis 01/14/2014   • Anorectal pain 05/03/2016   • Astigmatism 12/03/2014   • Bunion [id number]   • Chronic back pain 12/02/2015     low back and othes from RA      • Constipation 03/24/2016   • Cramp in limb 01/04/2016    lower   • Depressive disorder 08/12/2015   • Disorder of gallbladder 07/01/2015    gallstones, 6mm wall, 4 mm cbd      • Gastroesophageal reflux disease 01/25/2010   • H/O echocardiogram 04/10/2001    This is a limited study ,however the LV appears to be functioning normally with an estimated fraction of 60-65%   • History of colonic polyps 06/28/2016   • Hypermetropia 12/03/2014   • Hypertension    • Low back pain 06/14/2016    spinal stenosis on MRI Seeing korina Yen      • Neck pain 08/12/2015   • Neuropathy 08/12/2015   • Peripheral nervous system disorder 12/07/2012   • Primary malignant neoplasm of breast (CMS/HCC) 11/22/2012    no recurrence noted      • Rheumatoid arthritis (CMS/HCC) 04/01/2016   • Tobacco dependence syndrome 11/22/2012         Past Surgical History:   Procedure Laterality Date   • BREAST BIOPSY   02/06/2001    left ,breast mass,lobular carcinoma   • BREAST RECONSTRUCTION  10/09/2001    Bi;ateral nipple/areolar reconstruction using C-V flaps and full thickness skin grafts from groin.Acquired absence of bilateral breast   • CHOLECYSTECTOMY  06/01/2016    Laparoscopic right colon resectoin with ileocolonic anastomosis   • COLON SURGERY  2016 1/2 large colon removed   • COLONOSCOPY  06/01/2016    Laparoscopic right colon resectoin with ileocolonic anastomosis. 06/01/2016    n/a One 13 mm polyp at 90 cm prox. to the anus. Resected and retrieved.Normal colon 2 polyps.   • COLONOSCOPY N/A 7/18/2017    Procedure: COLONOSCOPY;  Surgeon: Brandyn Givens MD;  Location: [facility] ENDOSCOPY;  Service:    • ENDOSCOPY  04/10/2001    Hiatal hernia,Mild esophagitis,Due to the coarsening of the pancreas on ultrasound,will need to do a CT of the abdomen   • HAND SURGERY  11/21/1997    excision thumb lesion, MP joint    • INJECTION OF MEDICATION  09/30/2014    celestone(2)   • INJECTION OF MEDICATION  06/25/2013    DEXAMETHASONE(9)   • INJECTION OF MEDICATION  01/29/2012    SOLU-MEDROL (1)   • KNEE ARTHROSCOPY  03/01/2001    right,tear lateral meniscus   • MASTECTOMY  03/01/2001    left modified radical mastectomy,right total,varcinoma of the left breast,with lobular carcinoma in situ   • SKIN BIOPSY  08/03/1995    right,cheek lesion ,solar degeneration with sebaceous gland hyperplasia,         Family History   Problem Relation Age of Onset   • Cancer Other    • Heart disease Other    • Hypertension Other    • Lung disease Other    • Ulcers Other    • Hypertension Mother    • Heart disease Father    • Hypertension Father    • Cancer Sister    • Hypertension Sister    • Heart disease Brother    • Hypertension Brother    • Cancer Maternal Grandmother          Social History     Socioeconomic History   • Marital status:      Spouse name: Not on file   • Number of children: Not on file   • Years of education: Not on  "file   • Highest education level: Not on file   Tobacco Use   • Smoking status: Former Smoker     Packs/day: 1.00     Years: 45.00     Pack years: 45.00     Quit date: 2018     Years since quitting: 3.2   • Smokeless tobacco: Never Used   Vaping Use   • Vaping Use: Never used   Substance and Sexual Activity   • Alcohol use: No   • Drug use: No   • Sexual activity: Defer         Current Outpatient Medications   Medication Sig Dispense Refill   • albuterol sulfate  (90 Base) MCG/ACT inhaler Inhale 2 puffs 4 (Four) Times a Day. 8 g 11   • amLODIPine (NORVASC) 10 MG tablet Take 1 tablet by mouth Daily. 90 tablet 3   • atenolol (TENORMIN) 100 MG tablet Take 1 tablet by mouth Daily. 90 tablet 3   • cyclobenzaprine (FLEXERIL) 5 MG tablet Take 5 mg by mouth 4 (Four) Times a Day As Needed for Muscle Spasms.     • DULoxetine (CYMBALTA) 60 MG capsule Take 1 capsule by mouth Daily. 90 capsule 3   • hydroCHLOROthiazide (MICROZIDE) 12.5 MG capsule Take 1 capsule by mouth Daily As Needed (EDEMA). 30 capsule 11   • HYDROmorphone (DILAUDID) 2 MG tablet Take 2 mg by mouth 4 (Four) Times a Day.     • losartan (COZAAR) 100 MG tablet Take 1 tablet by mouth Daily. 90 tablet 3   • omeprazole (priLOSEC) 20 MG capsule Take 20 mg by mouth Daily.     • traZODone (DESYREL) 100 MG tablet Take 1 tablet by mouth Every Night. 90 tablet 3     No current facility-administered medications for this visit.         OBJECTIVE    Pulse 88   Ht 165.1 cm (65\")   Wt 90.7 kg (200 lb)   SpO2 98%   BMI 33.28 kg/m²       Review of Systems   Constitutional: Negative.    HENT: Negative.    Eyes: Negative.    Respiratory: Negative.    Cardiovascular: Negative.    Gastrointestinal: Negative.    Endocrine: Negative.    Genitourinary: Negative.    Musculoskeletal: Positive for back pain and joint swelling. Negative for arthralgias.        Joint pain  Ankle pain  Foot pain   Skin: Negative.    Allergic/Immunologic: Negative.    Neurological: Negative.  "   Hematological: Negative.    Psychiatric/Behavioral: Negative.          Physical Exam   Constitutional: he appears well-developed and well-nourished.   HEENT: Normocephalic. Atraumatic.  CV: No CP. RRR  Resp: Non-labored respirations.  Psychiatric: he has a normal mood and affect. his behavior is normal.         Lower Extremity Exam:  Vascular: DP/PT pulses palpable 2+.   Moderate ankle edema  Foot warm  Neuro: Protective sensation intact, b/l.  Light touch sensation intact, b/l  DTRs intact  Integument: No open wounds or lesions.  No erythema, scaling  Mild left ankle ecchymosis  Musculoskeletal: Right muscle strength 5/5.   Left dorsiflexion, plantarflexion, inversion, eversion intact; guarded.  Achilles, TA, TP, Peroneal tendons intact  No gross instability or crepitus, exam limited by pain  Sharp tenderness palpation of the distal most lateral and medial malleoli  Gait assisted with wheelchair          ASSESSMENT AND PLAN    Diagnoses and all orders for this visit:    1. Bimalleolar ankle fracture, left, closed, initial encounter (Primary)    2. Acute left ankle pain    3. Moderate ankle sprain, left, initial encounter      -Comprehensive foot and ankle exam  -Radiographs reviewed.  Discussed findings of avulsion bimalleolar ankle fracture on the left.  Discussed we treat this more or less like a bad sprain.  Will place into tall cam boot and allow her to begin weightbearing as tolerated.  -RICE therapy.  Compression hose dispensed.  -Recheck in 3 weeks for serial radiographs, possibly discontinue boot at that time        This document has been electronically signed by Atif Duran DPM on April 8, 2021 12:43 CDT       Much of this encounter note is an electronic transcription/translation of spoken language to printed text.   Atif Duran DPM  4/8/2021  12:43 CDT             Detail Level: Detailed Add 51645 Cpt? (Important Note: In 2017 The Use Of 33043 Is Being Tracked By Cms To Determine Future Global Period Reimbursement For Global Periods): yes

## (undated) DEVICE — SINGLE-USE BIOPSY FORCEPS: Brand: RADIAL JAW 4

## (undated) DEVICE — CANN SMPL SOFTECH BIFLO ETCO2 A/M 7FT